# Patient Record
Sex: FEMALE | Race: BLACK OR AFRICAN AMERICAN | Employment: UNEMPLOYED | URBAN - METROPOLITAN AREA
[De-identification: names, ages, dates, MRNs, and addresses within clinical notes are randomized per-mention and may not be internally consistent; named-entity substitution may affect disease eponyms.]

---

## 2017-05-10 ENCOUNTER — APPOINTMENT (OUTPATIENT)
Dept: GENERAL RADIOLOGY | Age: 37
End: 2017-05-10
Attending: EMERGENCY MEDICINE
Payer: MEDICAID

## 2017-05-10 ENCOUNTER — HOSPITAL ENCOUNTER (EMERGENCY)
Age: 37
Discharge: HOME | End: 2017-05-10
Attending: EMERGENCY MEDICINE | Admitting: EMERGENCY MEDICINE
Payer: MEDICAID

## 2017-05-10 VITALS
WEIGHT: 125 LBS | DIASTOLIC BLOOD PRESSURE: 95 MMHG | RESPIRATION RATE: 16 BRPM | HEIGHT: 64 IN | HEART RATE: 77 BPM | TEMPERATURE: 98.3 F | OXYGEN SATURATION: 99 % | SYSTOLIC BLOOD PRESSURE: 127 MMHG | BODY MASS INDEX: 21.34 KG/M2

## 2017-05-10 DIAGNOSIS — R07.9 CHEST PAIN, UNSPECIFIED TYPE: ICD-10-CM

## 2017-05-10 DIAGNOSIS — I10 ESSENTIAL HYPERTENSION: Primary | ICD-10-CM

## 2017-05-10 LAB
ALBUMIN SERPL BCP-MCNC: 3.3 G/DL (ref 3.5–5)
ALBUMIN/GLOB SERPL: 0.6 {RATIO} (ref 1.1–2.2)
ALP SERPL-CCNC: 101 U/L (ref 45–117)
ALT SERPL-CCNC: 33 U/L (ref 12–78)
ANION GAP BLD CALC-SCNC: 9 MMOL/L (ref 5–15)
AST SERPL W P-5'-P-CCNC: 17 U/L (ref 15–37)
ATRIAL RATE: 80 BPM
BASOPHILS # BLD AUTO: 0.1 K/UL (ref 0–0.1)
BASOPHILS # BLD: 1 % (ref 0–1)
BILIRUB SERPL-MCNC: 0.5 MG/DL (ref 0.2–1)
BUN SERPL-MCNC: 15 MG/DL (ref 6–20)
BUN/CREAT SERPL: 20 (ref 12–20)
CALCIUM SERPL-MCNC: 9.2 MG/DL (ref 8.5–10.1)
CALCULATED P AXIS, ECG09: 60 DEGREES
CALCULATED R AXIS, ECG10: 17 DEGREES
CALCULATED T AXIS, ECG11: 15 DEGREES
CHLORIDE SERPL-SCNC: 105 MMOL/L (ref 97–108)
CK SERPL-CCNC: 38 U/L (ref 26–192)
CO2 SERPL-SCNC: 26 MMOL/L (ref 21–32)
CREAT SERPL-MCNC: 0.76 MG/DL (ref 0.55–1.02)
DIAGNOSIS, 93000: NORMAL
DIFFERENTIAL METHOD BLD: ABNORMAL
EOSINOPHIL # BLD: 0 K/UL (ref 0–0.4)
EOSINOPHIL NFR BLD: 0 % (ref 0–7)
ERYTHROCYTE [DISTWIDTH] IN BLOOD BY AUTOMATED COUNT: 16.7 % (ref 11.5–14.5)
GLOBULIN SER CALC-MCNC: 5.7 G/DL (ref 2–4)
GLUCOSE SERPL-MCNC: 81 MG/DL (ref 65–100)
HCT VFR BLD AUTO: 44.2 % (ref 35–47)
HGB BLD-MCNC: 14.9 G/DL (ref 11.5–16)
LYMPHOCYTES # BLD AUTO: 39 % (ref 12–49)
LYMPHOCYTES # BLD: 2.1 K/UL (ref 0.8–3.5)
MCH RBC QN AUTO: 26.3 PG (ref 26–34)
MCHC RBC AUTO-ENTMCNC: 33.7 G/DL (ref 30–36.5)
MCV RBC AUTO: 78 FL (ref 80–99)
MONOCYTES # BLD: 0.4 K/UL (ref 0–1)
MONOCYTES NFR BLD AUTO: 7 % (ref 5–13)
NEUTS SEG # BLD: 2.8 K/UL (ref 1.8–8)
NEUTS SEG NFR BLD AUTO: 53 % (ref 32–75)
P-R INTERVAL, ECG05: 130 MS
PLATELET # BLD AUTO: 237 K/UL (ref 150–400)
POTASSIUM SERPL-SCNC: 4.2 MMOL/L (ref 3.5–5.1)
PROT SERPL-MCNC: 9 G/DL (ref 6.4–8.2)
Q-T INTERVAL, ECG07: 372 MS
QRS DURATION, ECG06: 84 MS
QTC CALCULATION (BEZET), ECG08: 429 MS
RBC # BLD AUTO: 5.67 M/UL (ref 3.8–5.2)
RBC MORPH BLD: ABNORMAL
SODIUM SERPL-SCNC: 140 MMOL/L (ref 136–145)
TROPONIN I SERPL-MCNC: <0.04 NG/ML
VENTRICULAR RATE, ECG03: 80 BPM
WBC # BLD AUTO: 5.4 K/UL (ref 3.6–11)
WBC MORPH BLD: ABNORMAL

## 2017-05-10 PROCEDURE — 84484 ASSAY OF TROPONIN QUANT: CPT | Performed by: EMERGENCY MEDICINE

## 2017-05-10 PROCEDURE — 71010 XR CHEST PORT: CPT

## 2017-05-10 PROCEDURE — 99283 EMERGENCY DEPT VISIT LOW MDM: CPT

## 2017-05-10 PROCEDURE — 80053 COMPREHEN METABOLIC PANEL: CPT | Performed by: EMERGENCY MEDICINE

## 2017-05-10 PROCEDURE — 36415 COLL VENOUS BLD VENIPUNCTURE: CPT | Performed by: EMERGENCY MEDICINE

## 2017-05-10 PROCEDURE — 85025 COMPLETE CBC W/AUTO DIFF WBC: CPT | Performed by: EMERGENCY MEDICINE

## 2017-05-10 PROCEDURE — 93005 ELECTROCARDIOGRAM TRACING: CPT

## 2017-05-10 PROCEDURE — 82550 ASSAY OF CK (CPK): CPT | Performed by: EMERGENCY MEDICINE

## 2017-05-10 PROCEDURE — 74011250637 HC RX REV CODE- 250/637: Performed by: EMERGENCY MEDICINE

## 2017-05-10 RX ORDER — AMLODIPINE BESYLATE 5 MG/1
5 TABLET ORAL DAILY
Qty: 30 TAB | Refills: 1 | Status: SHIPPED | OUTPATIENT
Start: 2017-05-10 | End: 2017-12-20 | Stop reason: CLARIF

## 2017-05-10 RX ORDER — HYDROCHLOROTHIAZIDE 25 MG/1
25 TABLET ORAL DAILY
Qty: 30 TAB | Refills: 1 | Status: SHIPPED | OUTPATIENT
Start: 2017-05-10 | End: 2017-06-09

## 2017-05-10 RX ORDER — HYDROCHLOROTHIAZIDE 25 MG/1
25 TABLET ORAL
Status: COMPLETED | OUTPATIENT
Start: 2017-05-10 | End: 2017-05-10

## 2017-05-10 RX ORDER — AMLODIPINE BESYLATE 5 MG/1
5 TABLET ORAL
Status: COMPLETED | OUTPATIENT
Start: 2017-05-10 | End: 2017-05-10

## 2017-05-10 RX ADMIN — AMLODIPINE BESYLATE 5 MG: 5 TABLET ORAL at 12:35

## 2017-05-10 RX ADMIN — HYDROCHLOROTHIAZIDE 25 MG: 25 TABLET ORAL at 12:35

## 2017-05-10 NOTE — ED PROVIDER NOTES
HPI Comments: Cherie Chavarria is a 29 y.o. female with PMHx significant for HTN, Thalassemia, and a PSHx of a hysterectomy who presents ambulatory to [facility] ED with cc of intermittent non-radiating midsternal chest tightness for the \"last few weeks\". Pt also complains of her feet and ankles swelling recently, which gets worse in the night and not as bad in the morning. Pt notes that her BP has been high recently and that she has been off her BP medication because of switching PCPs. She states she was taking 25 mg Hydrochlorothiazide and 10 mg Amlodipine last taken 2 weeks ago. Pt also notes that she has been worked up for hepatitis in the past. Pt denies any hx of heart problems that she is aware of. She also denies any nausea or being diaphoretic. Calista Goodpasture, MD    Social Hx: + smoking; - EtOH; + illicit drug use (marijuana; methadone as opioid, administered through needle)    There are no other complains, changes, or physical findings at this time. The history is provided by the patient. No  was used. Past Medical History:   Diagnosis Date    Hypertension     Other ill-defined conditions(799.89) ovarian cyst    Thalassemia        Past Surgical History:   Procedure Laterality Date    HX GYN      HX HEENT      t&a    HX HYSTERECTOMY           No family history on file. Social History     Social History    Marital status: SINGLE     Spouse name: N/A    Number of children: N/A    Years of education: N/A     Occupational History    Not on file. Social History Main Topics    Smoking status: Current Every Day Smoker     Packs/day: 0.50    Smokeless tobacco: Not on file    Alcohol use No    Drug use: No    Sexual activity: Yes     Birth control/ protection: None     Other Topics Concern    Not on file     Social History Narrative         ALLERGIES: Nsaids (non-steroidal anti-inflammatory drug)    Review of Systems   Constitutional: Negative.   Negative for appetite change, chills, diaphoresis, fatigue and fever. HENT: Negative. Negative for congestion, rhinorrhea, sinus pressure and sore throat. Eyes: Negative. Respiratory: Positive for chest tightness (nonradiating). Negative for cough, choking, shortness of breath and wheezing. Cardiovascular: Negative. Negative for chest pain, palpitations and leg swelling. Gastrointestinal: Negative for abdominal pain, constipation, diarrhea, nausea and vomiting. Endocrine: Negative. Genitourinary: Negative. Negative for difficulty urinating, dysuria, flank pain and urgency. Musculoskeletal: Positive for joint swelling (ankle). Skin: Negative. Neurological: Negative. Negative for dizziness, speech difficulty, weakness, light-headedness, numbness and headaches. Psychiatric/Behavioral: Negative. All other systems reviewed and are negative. Patient Vitals for the past 12 hrs:   Temp Pulse Resp BP SpO2   05/10/17 1235 - 77 - (!) 127/95 -   05/10/17 1102 98.3 °F (36.8 °C) 99 16 (!) 141/94 99 %         Physical Exam   Constitutional: She is oriented to person, place, and time. She appears well-developed and well-nourished. No distress. HENT:   Head: Normocephalic and atraumatic. Mouth/Throat: Oropharynx is clear and moist. No oropharyngeal exudate. Eyes: Conjunctivae and EOM are normal. Pupils are equal, round, and reactive to light. Neck: Normal range of motion. Neck supple. No JVD present. No tracheal deviation present. Cardiovascular: Normal rate, regular rhythm, normal heart sounds and intact distal pulses. No murmur heard. Pulmonary/Chest: Effort normal and breath sounds normal. No stridor. No respiratory distress. She has no wheezes. She has no rales. She exhibits no tenderness. Abdominal: Soft. She exhibits no distension. There is no tenderness. There is no rebound and no guarding. Musculoskeletal: Normal range of motion. She exhibits no edema or tenderness.    Neurological: She is alert and oriented to person, place, and time. No cranial nerve deficit. No gross motor or sensory deficits    Skin: Skin is warm and dry. She is not diaphoretic. Psychiatric: She has a normal mood and affect. Her behavior is normal.   Nursing note and vitals reviewed.        MDM  Number of Diagnoses or Management Options  Chest pain, unspecified type:   Essential hypertension:   Diagnosis management comments: DDx: ACS, Hypertensional crisis, renal failure, heart failure       Amount and/or Complexity of Data Reviewed  Clinical lab tests: ordered and reviewed  Tests in the radiology section of CPT®: ordered and reviewed  Tests in the medicine section of CPT®: ordered and reviewed  Review and summarize past medical records: yes  Independent visualization of images, tracings, or specimens: yes    Patient Progress  Patient progress: stable      Procedures     EKG- NSR, rate 80, normal axis/pr/qrs, no acute ST-T wave changes, Hazel Masters, DO      LABORATORY TESTS:  Recent Results (from the past 12 hour(s))   EKG, 12 LEAD, INITIAL    Collection Time: 05/10/17 11:08 AM   Result Value Ref Range    Ventricular Rate 80 BPM    Atrial Rate 80 BPM    P-R Interval 130 ms    QRS Duration 84 ms    Q-T Interval 372 ms    QTC Calculation (Bezet) 429 ms    Calculated P Axis 60 degrees    Calculated R Axis 17 degrees    Calculated T Axis 15 degrees    Diagnosis       Normal sinus rhythm  Possible Left atrial enlargement  Nonspecific T wave abnormality  When compared with ECG of 11-MAY-2013 12:17,  No significant change  Confirmed by Katja Agudelo ([id number]) on 5/10/2017 12:28:40 PM     CBC WITH AUTOMATED DIFF    Collection Time: 05/10/17 12:56 PM   Result Value Ref Range    WBC 5.4 3.6 - 11.0 K/uL    RBC 5.67 (H) 3.80 - 5.20 M/uL    HGB 14.9 11.5 - 16.0 g/dL    HCT 44.2 35.0 - 47.0 %    MCV 78.0 (L) 80.0 - 99.0 FL    MCH 26.3 26.0 - 34.0 PG    MCHC 33.7 30.0 - 36.5 g/dL    RDW 16.7 (H) 11.5 - 14.5 %    PLATELET 144 479 - 400 K/uL    NEUTROPHILS 53 32 - 75 %    LYMPHOCYTES 39 12 - 49 %    MONOCYTES 7 5 - 13 %    EOSINOPHILS 0 0 - 7 %    BASOPHILS 1 0 - 1 %    ABS. NEUTROPHILS 2.8 1.8 - 8.0 K/UL    ABS. LYMPHOCYTES 2.1 0.8 - 3.5 K/UL    ABS. MONOCYTES 0.4 0.0 - 1.0 K/UL    ABS. EOSINOPHILS 0.0 0.0 - 0.4 K/UL    ABS. BASOPHILS 0.1 0.0 - 0.1 K/UL    RBC COMMENTS ANISOCYTOSIS  1+        WBC COMMENTS REACTIVE LYMPHS      DF SMEAR SCANNED     METABOLIC PANEL, COMPREHENSIVE    Collection Time: 05/10/17 12:56 PM   Result Value Ref Range    Sodium 140 136 - 145 mmol/L    Potassium 4.2 3.5 - 5.1 mmol/L    Chloride 105 97 - 108 mmol/L    CO2 26 21 - 32 mmol/L    Anion gap 9 5 - 15 mmol/L    Glucose 81 65 - 100 mg/dL    BUN 15 6 - 20 MG/DL    Creatinine 0.76 0.55 - 1.02 MG/DL    BUN/Creatinine ratio 20 12 - 20      GFR est AA >60 >60 ml/min/1.73m2    GFR est non-AA >60 >60 ml/min/1.73m2    Calcium 9.2 8.5 - 10.1 MG/DL    Bilirubin, total 0.5 0.2 - 1.0 MG/DL    ALT (SGPT) 33 12 - 78 U/L    AST (SGOT) 17 15 - 37 U/L    Alk. phosphatase 101 45 - 117 U/L    Protein, total 9.0 (H) 6.4 - 8.2 g/dL    Albumin 3.3 (L) 3.5 - 5.0 g/dL    Globulin 5.7 (H) 2.0 - 4.0 g/dL    A-G Ratio 0.6 (L) 1.1 - 2.2     CK W/ REFLX CKMB    Collection Time: 05/10/17 12:56 PM   Result Value Ref Range    CK 38 26 - 192 U/L   TROPONIN I    Collection Time: 05/10/17 12:56 PM   Result Value Ref Range    Troponin-I, Qt. <0.04 <0.05 ng/mL       IMAGING RESULTS:    CXR Results  (Last 48 hours)               05/10/17 1248  XR CHEST PORT Final result    Impression:  IMPRESSION:       No acute thoracic disease. Narrative:  Clinical history: Chest pain   INDICATION: Chest pain, back pain recent trauma               COMPARISON: 5/11/2013       FINDINGS:    AP portable erect view of the chest is obtained . The cardiopericardial   silhouette is stable. There is no pleural effusion, pneumothorax or focal   consolidation present.                   MEDICATIONS GIVEN:  Medications hydroCHLOROthiazide (HYDRODIURIL) tablet 25 mg (25 mg Oral Given 5/10/17 1235)   amLODIPine (NORVASC) tablet 5 mg (5 mg Oral Given 5/10/17 1235)       IMPRESSION:  1. Essential hypertension    2. Chest pain, unspecified type        PLAN:  1. Current Discharge Medication List      CONTINUE these medications which have CHANGED    Details   amLODIPine (NORVASC) 5 mg tablet Take 1 Tab by mouth daily. Qty: 30 Tab, Refills: 1      hydroCHLOROthiazide (HYDRODIURIL) 25 mg tablet Take 1 Tab by mouth daily for 30 days. Qty: 30 Tab, Refills: 1           2. Follow-up Information     Follow up With Details [address], [place]   [address] [facility]  [phone]      [facility] EMERGENCY DEPT  If symptoms worsen [address]  [address]  [phone]        Return to ED if worse     DISCHARGE NOTE  2:22 PM  The patient has been re-evaluated and is ready for discharge. Reviewed available results with patient. Counseled patient on diagnosis and care plan. Patient has expressed understanding, and all questions have been answered. Patient agrees with plan and agrees to follow up as recommended, or return to the ED if their symptoms worsen. Discharge instructions have been provided and explained to the patient, along with reasons to return to the ED. ATTESTATION:  This note is prepared by Jessica Sutherland, acting as Scribe for Jonelle Goodpasture, MD.    Jonelle Goodpasture, MD: The scribe's documentation has been prepared under my direction and personally reviewed by me in its entirety. I confirm that the note above accurately reflects all work, treatment, procedures, and medical decision making performed by me.

## 2017-05-10 NOTE — DISCHARGE INSTRUCTIONS
Chest Pain: Care Instructions  Your Care Instructions  There are many things that can cause chest pain. Some are not serious and will get better on their own in a few days. But some kinds of chest pain need more testing and treatment. Your doctor may have recommended a follow-up visit in the next 8 to 12 hours. If you are not getting better, you may need more tests or treatment. Even though your doctor has released you, you still need to watch for any problems. The doctor carefully checked you, but sometimes problems can develop later. If you have new symptoms or if your symptoms do not get better, get medical care right away. If you have worse or different chest pain or pressure that lasts more than 5 minutes or you passed out (lost consciousness), call 911 or seek other emergency help right away. A medical visit is only one step in your treatment. Even if you feel better, you still need to do what your doctor recommends, such as going to all suggested follow-up appointments and taking medicines exactly as directed. This will help you recover and help prevent future problems. How can you care for yourself at home? · Rest until you feel better. · Take your medicine exactly as prescribed. Call your doctor if you think you are having a problem with your medicine. · Do not drive after taking a prescription pain medicine. When should you call for help? Call 911 if:  · You passed out (lost consciousness). · You have severe difficulty breathing. · You have symptoms of a heart attack. These may include:  ¨ Chest pain or pressure, or a strange feeling in your chest.  ¨ Sweating. ¨ Shortness of breath. ¨ Nausea or vomiting. ¨ Pain, pressure, or a strange feeling in your back, neck, jaw, or upper belly or in one or both shoulders or arms. ¨ Lightheadedness or sudden weakness. ¨ A fast or irregular heartbeat.   After you call 911, the  may tell you to chew 1 adult-strength or 2 to 4 low-dose aspirin. Wait for an ambulance. Do not try to drive yourself. Call your doctor today if:  · You have any trouble breathing. · Your chest pain gets worse. · You are dizzy or lightheaded, or you feel like you may faint. · You are not getting better as expected. · You are having new or different chest pain. Where can you learn more? Go to http://darlin-breann.info/. Enter A120 in the search box to learn more about \"Chest Pain: Care Instructions. \"  Current as of: May 27, 2016  Content Version: 11.2  © 6613-5857 [employer]. Care instructions adapted under license by [employer] (which disclaims liability or warranty for this information). If you have questions about a medical condition or this instruction, always ask your healthcare professional. [address] any warranty or liability for your use of this information. High Blood Pressure: Care Instructions  Your Care Instructions  If your blood pressure is usually above 140/90, you have high blood pressure, or hypertension. That means the top number is 140 or higher or the bottom number is 90 or higher, or both. Despite what a lot of people think, high blood pressure usually doesn't cause headaches or make you feel dizzy or lightheaded. It usually has no symptoms. But it does increase your risk for heart attack, stroke, and kidney or eye damage. The higher your blood pressure, the more your risk increases. Your doctor will give you a goal for your blood pressure. Your goal will be based on your health and your age. An example of a goal is to keep your blood pressure below 140/90. Lifestyle changes, such as eating healthy and being active, are always important to help lower blood pressure. You might also take medicine to reach your blood pressure goal.  Follow-up care is a key part of your treatment and safety.  Be sure to make and go to all appointments, and call your doctor if you are having problems. It's also a good idea to know your test results and keep a list of the medicines you take. How can you care for yourself at home? Medical treatment  · If you stop taking your medicine, your blood pressure will go back up. You may take one or more types of medicine to lower your blood pressure. Be safe with medicines. Take your medicine exactly as prescribed. Call your doctor if you think you are having a problem with your medicine. · Talk to your doctor before you start taking aspirin every day. Aspirin can help certain people lower their risk of a heart attack or stroke. But taking aspirin isn't right for everyone, because it can cause serious bleeding. · See your doctor regularly. You may need to see the doctor more often at first or until your blood pressure comes down. · If you are taking blood pressure medicine, talk to your doctor before you take decongestants or anti-inflammatory medicine, such as ibuprofen. Some of these medicines can raise blood pressure. · Learn how to check your blood pressure at home. Lifestyle changes  · Stay at a healthy weight. This is especially important if you put on weight around the waist. Losing even 10 pounds can help you lower your blood pressure. · If your doctor recommends it, get more exercise. Walking is a good choice. Bit by bit, increase the amount you walk every day. Try for at least 30 minutes on most days of the week. You also may want to swim, bike, or do other activities. · Avoid or limit alcohol. Talk to your doctor about whether you can drink any alcohol. · Try to limit how much sodium you eat to less than 2,300 milligrams (mg) a day. Your doctor may ask you to try to eat less than 1,500 mg a day. · Eat plenty of fruits (such as bananas and oranges), vegetables, legumes, whole grains, and low-fat dairy products. · Lower the amount of saturated fat in your diet. Saturated fat is found in animal products such as milk, cheese, and meat. Limiting these foods may help you lose weight and also lower your risk for heart disease. · Do not smoke. Smoking increases your risk for heart attack and stroke. If you need help quitting, talk to your doctor about stop-smoking programs and medicines. These can increase your chances of quitting for good. When should you call for help? Call 911 anytime you think you may need emergency care. This may mean having symptoms that suggest that your blood pressure is causing a serious heart or blood vessel problem. Your blood pressure may be over 180/110. For example, call 911 if:  · You have symptoms of a heart attack. These may include:  ¨ Chest pain or pressure, or a strange feeling in the chest.  ¨ Sweating. ¨ Shortness of breath. ¨ Nausea or vomiting. ¨ Pain, pressure, or a strange feeling in the back, neck, jaw, or upper belly or in one or both shoulders or arms. ¨ Lightheadedness or sudden weakness. ¨ A fast or irregular heartbeat. · You have symptoms of a stroke. These may include:  ¨ Sudden numbness, tingling, weakness, or loss of movement in your face, arm, or leg, especially on only one side of your body. ¨ Sudden vision changes. ¨ Sudden trouble speaking. ¨ Sudden confusion or trouble understanding simple statements. ¨ Sudden problems with walking or balance. ¨ A sudden, severe headache that is different from past headaches. · You have severe back or belly pain. Do not wait until your blood pressure comes down on its own. Get help right away. Call your doctor now or seek immediate care if:  · Your blood pressure is much higher than normal (such as 180/110 or higher), but you don't have symptoms. · You think high blood pressure is causing symptoms, such as:  ¨ Severe headache. ¨ Blurry vision. Watch closely for changes in your health, and be sure to contact your doctor if:  · Your blood pressure measures 140/90 or higher at least 2 times.  That means the top number is 140 or higher or the bottom number is 90 or higher, or both. · You think you may be having side effects from your blood pressure medicine. · Your blood pressure is usually normal, but it goes above normal at least 2 times. Where can you learn more? Go to http://darlin-breann.info/. Enter C399 in the search box to learn more about \"High Blood Pressure: Care Instructions. \"  Current as of: August 8, 2016  Content Version: 11.2  © 5395-0417 [employer]. Care instructions adapted under license by [employer] (which disclaims liability or warranty for this information). If you have questions about a medical condition or this instruction, always ask your healthcare professional. [address] any warranty or liability for your use of this information.       THERE IS A HYPERTENSION CLINIC DOWNTOWN IF YOU ARE CONTINUING TO HAVE DIFFICULTIES FINDING A PCP

## 2017-11-03 ENCOUNTER — OFFICE VISIT (OUTPATIENT)
Dept: INTERNAL MEDICINE CLINIC | Age: 37
End: 2017-11-03

## 2017-11-03 VITALS
WEIGHT: 118.8 LBS | HEART RATE: 77 BPM | HEIGHT: 64 IN | OXYGEN SATURATION: 97 % | DIASTOLIC BLOOD PRESSURE: 92 MMHG | BODY MASS INDEX: 20.28 KG/M2 | SYSTOLIC BLOOD PRESSURE: 123 MMHG | TEMPERATURE: 98.3 F | RESPIRATION RATE: 16 BRPM

## 2017-11-03 DIAGNOSIS — R74.8 ELEVATED LIVER ENZYMES: ICD-10-CM

## 2017-11-03 DIAGNOSIS — F41.9 ANXIETY DISORDER, UNSPECIFIED TYPE: ICD-10-CM

## 2017-11-03 DIAGNOSIS — Z90.721 S/P HYSTERECTOMY WITH OOPHORECTOMY: ICD-10-CM

## 2017-11-03 DIAGNOSIS — F43.10 PTSD (POST-TRAUMATIC STRESS DISORDER): ICD-10-CM

## 2017-11-03 DIAGNOSIS — Z00.00 WELL ADULT EXAM: Primary | ICD-10-CM

## 2017-11-03 DIAGNOSIS — F11.20 METHADONE MAINTENANCE THERAPY PATIENT (HCC): ICD-10-CM

## 2017-11-03 DIAGNOSIS — D56.0 ALPHA THALASSEMIA (HCC): ICD-10-CM

## 2017-11-03 DIAGNOSIS — Z90.710 S/P HYSTERECTOMY WITH OOPHORECTOMY: ICD-10-CM

## 2017-11-03 DIAGNOSIS — M65.331 TRIGGER MIDDLE FINGER OF RIGHT HAND: ICD-10-CM

## 2017-11-03 DIAGNOSIS — Z23 ENCOUNTER FOR IMMUNIZATION: ICD-10-CM

## 2017-11-03 DIAGNOSIS — F33.9 RECURRENT MAJOR DEPRESSIVE DISORDER, REMISSION STATUS UNSPECIFIED (HCC): ICD-10-CM

## 2017-11-03 DIAGNOSIS — I10 ESSENTIAL HYPERTENSION: ICD-10-CM

## 2017-11-03 RX ORDER — HYDROCHLOROTHIAZIDE 25 MG/1
25 TABLET ORAL DAILY
COMMUNITY
End: 2017-12-20 | Stop reason: CLARIF

## 2017-11-03 RX ORDER — SERTRALINE HYDROCHLORIDE 50 MG/1
50 TABLET, FILM COATED ORAL DAILY
Qty: 1 TAB | Refills: 0 | Status: SHIPPED | OUTPATIENT
Start: 2017-11-03 | End: 2018-02-22 | Stop reason: SDDI

## 2017-11-03 RX ORDER — GABAPENTIN 100 MG/1
100 CAPSULE ORAL 3 TIMES DAILY
Qty: 1 CAP | Refills: 0 | Status: SHIPPED | OUTPATIENT
Start: 2017-11-03 | End: 2018-02-22 | Stop reason: SDDI

## 2017-11-03 RX ORDER — FLUOXETINE 20 MG/1
20 TABLET ORAL DAILY
Qty: 1 TAB | Refills: 0 | Status: SHIPPED | OUTPATIENT
Start: 2017-11-03 | End: 2018-02-22 | Stop reason: SDDI

## 2017-11-03 RX ORDER — METHADONE HYDROCHLORIDE 10 MG/1
80 TABLET ORAL DAILY
Qty: 1 TAB | Refills: 0
Start: 2017-11-03 | End: 2021-06-08 | Stop reason: ALTCHOICE

## 2017-11-03 NOTE — PROGRESS NOTES
Hua Lyles is a 40 y.o. female and presents with New Patient  . Subjective:    Pt comes-in to establish care and to be tested for hep c. 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 Pt had labs done at the methadone clinic which demonstarted elevated lfts. Pt denies drinking ANY alcohol. She was instructed to find a PCP for w/u. Pts sex partner is +hep c. Pt reports she was tested for hiv/syhylis/gc/chlamydia and all are neg. Pt also has pmh bipoar d/o, PTSD/anxiety d/o and is followed by psychiatry. She has a h/o psychiatric admissions for suicidal ideation. Last admission 8/16. Pt denies SI currently. Pt w c/o:  1)Amrit hand swelling and stiffness x years. No joint pain. Her right hand is worse than left    2)Rt index finger \"locks\" on her. She has to manually unbend it. Hypertension Review:  The patient has essential hypertension  Diet and Lifestyle: generally follows a  low sodium diet, exercises-walks regularly  Home BP Monitoring: is not measured at home. Pertinent ROS: taking medications as instructed, no medication side effects noted, no TIA's, no chest pain on exertion, no dyspnea on exertion, no swelling of ankles. Tet vaccine-??? Lives w 3 children  Works as housekeeping  + sex active  Pap ~ 1 1/2 year nml  exercie-walks regularly    S/p hysterectomy and oophorectomy due to endometriosis, fibroids and ovarian cysts      Review of Systems  Constitutional: negative for fevers, chills, anorexia and weight loss  Eyes:   negative for visual disturbance and irritation  ENT:   negative for tinnitus,sore throat,nasal congestion,ear pains. hoarseness  Respiratory:  negative for cough, hemoptysis, dyspnea,wheezing  CV:   negative for chest pain, palpitations, +intermitt le extremity edema  GI:   negative for nausea, vomiting, diarrhea, abdominal pain,melena  Endo:               negative for polyuria,polydipsia,polyphagia,heat intolerance  Genitourinary: negative for frequency, dysuria and hematuria  Integument:  negative for rash and pruritus  Hematologic:  negative for easy bruising and gum/nose bleeding  Musculoskel: negative for myalgias, arthralgias, back pain, muscle weakness, joint pain  Neurological:  negative for headaches, dizziness, vertigo, memory problems and gait   Behavl/Psych: positive for feelings of anxiety, depression, mood changes    Past Medical History:   Diagnosis Date    ex- IVDU (intravenous drug user)     H/O hysterectomy for benign disease     Hypertension     Major depression, recurrent ([facility]     Methadone maintenance therapy patient ([facility]     Other ill-defined conditions(799.89) ovarian cyst    PTSD (post-traumatic stress disorder)     Thalassemia      Past Surgical History:   Procedure Laterality Date    HX GYN      HX HEENT      t&a    HX HYSTERECTOMY       Social History     Social History    Marital status: SINGLE     Spouse name: N/A    Number of children: N/A    Years of education: N/A     Social History Main Topics    Smoking status: Current Every Day Smoker     Packs/day: 0.50    Smokeless tobacco: Current User    Alcohol use No    Drug use: No    Sexual activity: Not Currently     Birth control/ protection: None     Other Topics Concern    None     Social History Narrative     Family History   Problem Relation Age of Onset    Hypertension Mother     Hypertension Sister     Asthma Sister     Heart Disease Sister     Diabetes Maternal Aunt     Diabetes Maternal Uncle      Current Outpatient Prescriptions   Medication Sig Dispense Refill    hydroCHLOROthiazide (HYDRODIURIL) 25 mg tablet Take 25 mg by mouth daily.  methadone (DOLOPHINE) 10 mg tablet Take 8 Tabs by mouth daily. Max Daily Amount: 80 mg. 1 Tab 0    sertraline (ZOLOFT) 50 mg tablet Take 1 Tab by mouth daily. Indications: ANXIETY WITH DEPRESSION 1 Tab 0    gabapentin (NEURONTIN) 100 mg capsule Take 1 Cap by mouth three (3) times daily.  1 Cap 0    FLUoxetine (PROZAC) 20 mg tablet Take 1 Tab by mouth daily. 1 Tab 0    amLODIPine (NORVASC) 5 mg tablet Take 1 Tab by mouth daily. 30 Tab 1    estradiol (ESTRACE) 2 mg tablet Take 2 mg by mouth daily. Allergies   Allergen Reactions    Nsaids (Non-Steroidal Anti-Inflammatory Drug) Unknown (comments)     Dr. Nida Onofre told me no NSAID's because of hypertension problems. Objective:  Visit Vitals    BP (!) 123/92 (BP 1 Location: Left arm, BP Patient Position: Sitting)    Pulse 77    Temp 98.3 °F (36.8 °C) (Oral)    Resp 16    Ht 5' 4\" (1.626 m)    Wt 118 lb 12.8 oz (53.9 kg)    SpO2 97%    BMI 20.39 kg/m2     Physical Exam:   General appearance - alert, well appearing, and in no distress  Mental status - alert, oriented to person, place, and time  EYE-SOHA, EOMI, corneas normal, no foreign bodies  ENT-ENT exam normal, no neck nodes or sinus tenderness  Mouth - mucous membranes moist, pharynx normal without lesions  Neck - supple, no significant adenopathy   Chest - clear to auscultation, no wheezes, rales or rhonchi, symmetric air entry   Heart - normal rate, regular rhythm, normal S1, S2, no murmurs, rubs, clicks or gallops   Abdomen - soft, nontender, nondistended, no masses or organomegaly  Ext-peripheral pulses normal, no pedal edema, no clubbing or cyanosis  Skin-Warm and dry.  no hyperpigmentation, vitiligo, +track marks edna inner arms rt>lt  Neuro -alert, oriented, normal speech, no focal findings or movement disorder noted  Hands-large, doughy edema edna rt>lt      Results for orders placed or performed during the hospital encounter of 05/10/17   CBC WITH AUTOMATED DIFF   Result Value Ref Range    WBC 5.4 3.6 - 11.0 K/uL    RBC 5.67 (H) 3.80 - 5.20 M/uL    HGB 14.9 11.5 - 16.0 g/dL    HCT 44.2 35.0 - 47.0 %    MCV 78.0 (L) 80.0 - 99.0 FL    MCH 26.3 26.0 - 34.0 PG    MCHC 33.7 30.0 - 36.5 g/dL    RDW 16.7 (H) 11.5 - 14.5 %    PLATELET 068 156 - 876 K/uL    NEUTROPHILS 53 32 - 75 % LYMPHOCYTES 39 12 - 49 %    MONOCYTES 7 5 - 13 %    EOSINOPHILS 0 0 - 7 %    BASOPHILS 1 0 - 1 %    ABS. NEUTROPHILS 2.8 1.8 - 8.0 K/UL    ABS. LYMPHOCYTES 2.1 0.8 - 3.5 K/UL    ABS. MONOCYTES 0.4 0.0 - 1.0 K/UL    ABS. EOSINOPHILS 0.0 0.0 - 0.4 K/UL    ABS. BASOPHILS 0.1 0.0 - 0.1 K/UL    RBC COMMENTS ANISOCYTOSIS  1+        WBC COMMENTS REACTIVE LYMPHS      DF SMEAR SCANNED     METABOLIC PANEL, COMPREHENSIVE   Result Value Ref Range    Sodium 140 136 - 145 mmol/L    Potassium 4.2 3.5 - 5.1 mmol/L    Chloride 105 97 - 108 mmol/L    CO2 26 21 - 32 mmol/L    Anion gap 9 5 - 15 mmol/L    Glucose 81 65 - 100 mg/dL    BUN 15 6 - 20 MG/DL    Creatinine 0.76 0.55 - 1.02 MG/DL    BUN/Creatinine ratio 20 12 - 20      GFR est AA >60 >60 ml/min/1.73m2    GFR est non-AA >60 >60 ml/min/1.73m2    Calcium 9.2 8.5 - 10.1 MG/DL    Bilirubin, total 0.5 0.2 - 1.0 MG/DL    ALT (SGPT) 33 12 - 78 U/L    AST (SGOT) 17 15 - 37 U/L    Alk. phosphatase 101 45 - 117 U/L    Protein, total 9.0 (H) 6.4 - 8.2 g/dL    Albumin 3.3 (L) 3.5 - 5.0 g/dL    Globulin 5.7 (H) 2.0 - 4.0 g/dL    A-G Ratio 0.6 (L) 1.1 - 2.2     CK W/ REFLX CKMB   Result Value Ref Range    CK 38 26 - 192 U/L   TROPONIN I   Result Value Ref Range    Troponin-I, Qt. <0.04 <0.05 ng/mL   EKG, 12 LEAD, INITIAL   Result Value Ref Range    Ventricular Rate 80 BPM    Atrial Rate 80 BPM    P-R Interval 130 ms    QRS Duration 84 ms    Q-T Interval 372 ms    QTC Calculation (Bezet) 429 ms    Calculated P Axis 60 degrees    Calculated R Axis 17 degrees    Calculated T Axis 15 degrees    Diagnosis       Normal sinus rhythm  Possible Left atrial enlargement  Nonspecific T wave abnormality  When compared with ECG of 11-MAY-2013 12:17,  No significant change  Confirmed by Diana Sifuentes ([id number]) on 5/10/2017 12:28:40 PM         Assessment/Plan:    ICD-10-CM ICD-9-CM    1. Well adult exam Z00.00 V70.0    2.  Elevated liver enzymes R74.8 790.5 LIPID PANEL      HEPATIC FUNCTION PANEL HEPATITIS C AB   3. Trigger middle finger of right hand M65.331 727.03 REFERRAL TO ORTHOPEDICS   4. Methadone maintenance therapy patient ([facility] F11.20 304.00 methadone (DOLOPHINE) 10 mg tablet   5. Essential hypertension I10 401.9 hydroCHLOROthiazide (HYDRODIURIL) 25 mg tablet   6. Anxiety disorder, unspecified type F41.9 300.00 sertraline (ZOLOFT) 50 mg tablet      FLUoxetine (PROZAC) 20 mg tablet   7. Recurrent major depressive disorder, remission status unspecified (HCC) F33.9 296.30 sertraline (ZOLOFT) 50 mg tablet      FLUoxetine (PROZAC) 20 mg tablet   8. PTSD (post-traumatic stress disorder) F43.10 309.81    9. Alpha thalassemia (HCC) D56.0 282.43    10. S/P hysterectomy with oophorectomy Z90.710 V88.01     Z90.721     11. Encounter for immunization Z23 V03.89 TETANUS, DIPHTHERIA TOXOIDS AND ACELLULAR PERTUSSIS VACCINE (TDAP), IN INDIVIDS. >=7, IM     Orders Placed This Encounter    TETANUS, DIPHTHERIA TOXOIDS AND ACELLULAR PERTUSSIS VACCINE (TDAP), IN INDIVIDS. >=7, IM    LIPID PANEL    HEPATIC FUNCTION PANEL    HEPATITIS C AB   [facility]     Referral Priority:   Routine     Referral Type:   Consultation     Referral Reason:   Specialty Services Required     Referral Location:   [place] [address] [facility]     Referred to Provider:   Kelvin Guzmán MD    hydroCHLOROthiazide (HYDRODIURIL) 25 mg tablet     Sig: Take 25 mg by mouth daily.  methadone (DOLOPHINE) 10 mg tablet     Sig: Take 8 Tabs by mouth daily. Max Daily Amount: 80 mg. Dispense:  1 Tab     Refill:  0    sertraline (ZOLOFT) 50 mg tablet     Sig: Take 1 Tab by mouth daily. Indications: ANXIETY WITH DEPRESSION     Dispense:  1 Tab     Refill:  0    gabapentin (NEURONTIN) 100 mg capsule     Sig: Take 1 Cap by mouth three (3) times daily. Dispense:  1 Cap     Refill:  0    FLUoxetine (PROZAC) 20 mg tablet     Sig: Take 1 Tab by mouth daily.      Dispense:  1 Tab     Refill:  0     follow low salt diet, routine labs ordered    Patient Instructions        Low Sodium Diet (2,000 Milligram): Care Instructions  Your Care Instructions    Too much sodium causes your body to hold on to extra water. This can raise your blood pressure and force your heart and kidneys to work harder. In very serious cases, this could cause you to be put in the hospital. It might even be life-threatening. By limiting sodium, you will feel better and lower your risk of serious problems. The most common source of sodium is salt. People get most of the salt in their diet from canned, prepared, and packaged foods. Fast food and restaurant meals also are very high in sodium. Your doctor will probably limit your sodium to less than 2,000 milligrams (mg) a day. This limit counts all the sodium in prepared and packaged foods and any salt you add to your food. Follow-up care is a key part of your treatment and safety. Be sure to make and go to all appointments, and call your doctor if you are having problems. It's also a good idea to know your test results and keep a list of the medicines you take. How can you care for yourself at home? Read food labels  · Read labels on cans and food packages. The labels tell you how much sodium is in each serving. Make sure that you look at the serving size. If you eat more than the serving size, you have eaten more sodium. · Food labels also tell you the Percent Daily Value for sodium. Choose products with low Percent Daily Values for sodium. · Be aware that sodium can come in forms other than salt, including monosodium glutamate (MSG), sodium citrate, and sodium bicarbonate (baking soda). MSG is often added to Asian food. When you eat out, you can sometimes ask for food without MSG or added salt. Buy low-sodium foods  · Buy foods that are labeled \"unsalted\" (no salt added), \"sodium-free\" (less than 5 mg of sodium per serving), or \"low-sodium\" (less than 140 mg of sodium per serving).  Foods labeled \"reduced-sodium\" and \"light sodium\" may still have too much sodium. Be sure to read the label to see how much sodium you are getting. · Buy fresh vegetables, or frozen vegetables without added sauces. Buy low-sodium versions of canned vegetables, soups, and other canned goods. Prepare low-sodium meals  · Cut back on the amount of salt you use in cooking. This will help you adjust to the taste. Do not add salt after cooking. One teaspoon of salt has about 2,300 mg of sodium. · Take the salt shaker off the table. · Flavor your food with garlic, lemon juice, onion, vinegar, herbs, and spices. Do not use soy sauce, lite soy sauce, steak sauce, onion salt, garlic salt, celery salt, mustard, or ketchup on your food. · Use low-sodium salad dressings, sauces, and ketchup. Or make your own salad dressings and sauces without adding salt. · Use less salt (or none) when recipes call for it. You can often use half the salt a recipe calls for without losing flavor. Other foods such as rice, pasta, and grains do not need added salt. · Rinse canned vegetables, and cook them in fresh water. This removes some-but not all-of the salt. · Avoid water that is naturally high in sodium or that has been treated with water softeners, which add sodium. Call your local water company to find out the sodium content of your water supply. If you buy bottled water, read the label and choose a sodium-free brand. Avoid high-sodium foods  · Avoid eating:  ¨ Smoked, cured, salted, and canned meat, fish, and poultry. ¨ Ham, uriarte, hot dogs, and luncheon meats. ¨ Regular, hard, and processed cheese and regular peanut butter. ¨ Crackers with salted tops, and other salted snack foods such as pretzels, chips, and salted popcorn. ¨ Frozen prepared meals, unless labeled low-sodium. ¨ Canned and dried soups, broths, and bouillon, unless labeled sodium-free or low-sodium. ¨ Canned vegetables, unless labeled sodium-free or low-sodium.   ¨ [place] fries, pizza, tacos, and other fast foods. ¨ Pickles, olives, ketchup, and other condiments, especially soy sauce, unless labeled sodium-free or low-sodium. Where can you learn more? Go to http://darlin-breann.info/. Enter W226 in the search box to learn more about \"Low Sodium Diet (2,000 Milligram): Care Instructions. \"  Current as of: May 12, 2017  Content Version: 11.4  © 4820-9835 [employer]. Care instructions adapted under license by [employer] (which disclaims liability or warranty for this information). If you have questions about a medical condition or this instruction, always ask your healthcare professional. [place] [address] any warranty or liability for your use of this information. Follow-up Disposition:  Return in about 6 weeks (around 12/15/2017) for bp check. I have reviewed with the patient details of the assessment and plan and all questions were answered. Relevent patient education was performed. The most recent lab findings were reviewed with the patient. An After Visit Summary was printed and given to the patient.       Total encounter time was 45 minutes;>50% of time was spent counseling/coordinating care regarding hep c/htn/low salt diet/med compliance

## 2017-11-03 NOTE — PROGRESS NOTES
1. Have you been to the ER, urgent care clinic since your last visit? Hospitalized since your last visit? No.    2. Have you seen or consulted any other health care providers outside of the [address] [place] [address] since your last visit? Include any pap smears or colon screening.  No.

## 2017-11-03 NOTE — PATIENT INSTRUCTIONS

## 2017-11-03 NOTE — MR AVS SNAPSHOT
Visit Information Date & Time Provider Department Dept. Phone Encounter #  
 11/3/2017  9:00 AM Johnna Barthel, [address]  [id number] Follow-up Instructions Return in about 6 weeks (around 12/15/2017) for bp check. Upcoming Health Maintenance Date Due DTaP/Tdap/Td series (1 - Tdap) 9/5/2002 PAP AKA CERVICAL CYTOLOGY 9/5/2002 Allergies as of 11/3/2017  Review Complete On: 11/3/2017 By: Johnna Barthel, MD  
  
 Severity Noted Reaction Type Reactions Nsaids (Non-steroidal Anti-inflammatory Drug)  09/25/2011    Unknown (comments) Dr. Yana Bui told me no NSAID's because of hypertension problems. Current Immunizations  Never Reviewed Name Date Tdap  Incomplete Not reviewed this visit You Were Diagnosed With   
  
 Codes Comments Well adult exam    -  Primary ICD-10-CM: Z00.00 ICD-9-CM: V70.0 Encounter for immunization     ICD-10-CM: H14 ICD-9-CM: V03.89 Trigger middle finger of right hand     ICD-10-CM: M65.331 ICD-9-CM: 727.03 Anxiety disorder, unspecified type     ICD-10-CM: F41.9 ICD-9-CM: 300.00 Recurrent major depressive disorder, remission status unspecified ([facility]     ICD-10-CM: F33.9 ICD-9-CM: 296.30 PTSD (post-traumatic stress disorder)     ICD-10-CM: F43.10 ICD-9-CM: 309.81 Elevated liver enzymes     ICD-10-CM: R74.8 ICD-9-CM: 790.5 Alpha thalassemia (HCC)     ICD-10-CM: D56.0 ICD-9-CM: 282.43 S/P hysterectomy with oophorectomy     ICD-10-CM: Z90.710, Z90.721 ICD-9-CM: V88.01 Vitals BP Pulse Temp Resp Height(growth percentile) Weight(growth percentile) (!) 123/92 (BP 1 Location: Left arm, BP Patient Position: Sitting) 77 98.3 °F (36.8 °C) (Oral) 16 5' 4\" (1.626 m) 118 lb 12.8 oz (53.9 kg) SpO2 BMI OB Status Smoking Status 97% 20.39 kg/m2 Hysterectomy Current Every Day Smoker Vitals History BMI and BSA Data Body Mass Index Body Surface Area  
 20.39 kg/m 2 1.56 m 2 Your Updated Medication List  
  
   
This list is accurate as of: 11/3/17 10:02 AM.  Always use your most recent med list. amLODIPine 5 mg tablet Commonly known as:  [place] Take 1 Tab by mouth daily. estradiol 2 mg tablet Commonly known as:  ESTRACE Take 2 mg by mouth daily. FLUoxetine 20 mg tablet Commonly known as:  PROzac Take 1 Tab by mouth daily. gabapentin 100 mg capsule Commonly known as:  NEURONTIN Take 1 Cap by mouth three (3) times daily. hydroCHLOROthiazide 25 mg tablet Commonly known as:  HYDRODIURIL Take 25 mg by mouth daily. methadone 10 mg tablet Commonly known as:  DOLOPHINE Take 8 Tabs by mouth daily. Max Daily Amount: 80 mg.  
  
 sertraline 50 mg tablet Commonly known as:  ZOLOFT Take 1 Tab by mouth daily. Indications: ANXIETY WITH DEPRESSION Prescriptions Printed Refills  
 sertraline (ZOLOFT) 50 mg tablet 0 Sig: Take 1 Tab by mouth daily. Indications: ANXIETY WITH DEPRESSION Class: Print Route: Oral  
 gabapentin (NEURONTIN) 100 mg capsule 0 Sig: Take 1 Cap by mouth three (3) times daily. Class: Print Route: Oral  
 FLUoxetine (PROZAC) 20 mg tablet 0 Sig: Take 1 Tab by mouth daily. Class: Print Route: Oral  
  
We Performed the Following HEPATIC FUNCTION PANEL [01489 CPT(R)] HEPATITIS C AB [51434 CPT(R)] LIPID PANEL [97212 CPT(R)] REFERRAL TO ORTHOPEDICS [ENB554 Custom] TETANUS, DIPHTHERIA TOXOIDS AND ACELLULAR PERTUSSIS VACCINE (TDAP), IN INDIVIDS. >=7, IM V6129056 CPT(R)] Follow-up Instructions Return in about 6 weeks (around 12/15/2017) for bp check. Referral Information Referral ID Referred By Referred To  
  
 [id number] Siomara Ayers [facility]opaedic [facility]   
   [address] [address] [facility], [facility] [address], [address] [facility] Visits Status Start Date End Date 1 New Request 11/3/17 11/3/18 If your referral has a status of pending review or denied, additional information will be sent to support the outcome of this decision. Patient Instructions Low Sodium Diet (2,000 Milligram): Care Instructions Your Care Instructions Too much sodium causes your body to hold on to extra water. This can raise your blood pressure and force your heart and kidneys to work harder. In very serious cases, this could cause you to be put in the hospital. It might even be life-threatening. By limiting sodium, you will feel better and lower your risk of serious problems. The most common source of sodium is salt. People get most of the salt in their diet from canned, prepared, and packaged foods. Fast food and restaurant meals also are very high in sodium. Your doctor will probably limit your sodium to less than 2,000 milligrams (mg) a day. This limit counts all the sodium in prepared and packaged foods and any salt you add to your food. Follow-up care is a key part of your treatment and safety. Be sure to make and go to all appointments, and call your doctor if you are having problems. It's also a good idea to know your test results and keep a list of the medicines you take. How can you care for yourself at home? Read food labels · Read labels on cans and food packages. The labels tell you how much sodium is in each serving. Make sure that you look at the serving size. If you eat more than the serving size, you have eaten more sodium. · Food labels also tell you the Percent Daily Value for sodium. Choose products with low Percent Daily Values for sodium. · Be aware that sodium can come in forms other than salt, including monosodium glutamate (MSG), sodium citrate, and sodium bicarbonate (baking soda). MSG is often added to Asian food. When you eat out, you can sometimes ask for food without MSG or added salt. Buy low-sodium foods · Buy foods that are labeled \"unsalted\" (no salt added), \"sodium-free\" (less than 5 mg of sodium per serving), or \"low-sodium\" (less than 140 mg of sodium per serving). Foods labeled \"reduced-sodium\" and \"light sodium\" may still have too much sodium. Be sure to read the label to see how much sodium you are getting. · Buy fresh vegetables, or frozen vegetables without added sauces. Buy low-sodium versions of canned vegetables, soups, and other canned goods. Prepare low-sodium meals · Cut back on the amount of salt you use in cooking. This will help you adjust to the taste. Do not add salt after cooking. One teaspoon of salt has about 2,300 mg of sodium. · Take the salt shaker off the table. · Flavor your food with garlic, lemon juice, onion, vinegar, herbs, and spices. Do not use soy sauce, lite soy sauce, steak sauce, onion salt, garlic salt, celery salt, mustard, or ketchup on your food. · Use low-sodium salad dressings, sauces, and ketchup. Or make your own salad dressings and sauces without adding salt. · Use less salt (or none) when recipes call for it. You can often use half the salt a recipe calls for without losing flavor. Other foods such as rice, pasta, and grains do not need added salt. · Rinse canned vegetables, and cook them in fresh water. This removes some-but not all-of the salt. · Avoid water that is naturally high in sodium or that has been treated with water softeners, which add sodium. Call your local water company to find out the sodium content of your water supply. If you buy bottled water, read the label and choose a sodium-free brand. Avoid high-sodium foods · Avoid eating: ¨ Smoked, cured, salted, and canned meat, fish, and poultry. ¨ Ham, uriarte, hot dogs, and luncheon meats. ¨ Regular, hard, and processed cheese and regular peanut butter. ¨ Crackers with salted tops, and other salted snack foods such as pretzels, chips, and salted popcorn. ¨ Frozen prepared meals, unless labeled low-sodium. ¨ Canned and dried soups, broths, and bouillon, unless labeled sodium-free or low-sodium. ¨ Canned vegetables, unless labeled sodium-free or low-sodium. ¨ Western Shantel fries, pizza, tacos, and other fast foods. ¨ Pickles, olives, ketchup, and other condiments, especially soy sauce, unless labeled sodium-free or low-sodium. Where can you learn more? Go to http://darlin-breann.info/. Enter C382 in the search box to learn more about \"Low Sodium Diet (2,000 Milligram): Care Instructions. \" Current as of: May 12, 2017 Content Version: 11.4 © 2546-5516 [employer]. Care instructions adapted under license by DietBetter (which disclaims liability or warranty for this information). If you have questions about a medical condition or this instruction, always ask your healthcare professional. [place] [address] any warranty or liability for your use of this information. Introducing [facility]! Dai Shannon introduces Tiberium patient portal. Now you can access parts of your medical record, email your doctor's office, and request medication refills online. 1. In your internet browser, go to [email or web address] 2. Click on the First Time User? Click Here link in the Sign In box. You will see the New Member Sign Up page. 3. Enter your Tiberium Access Code exactly as it appears below. You will not need to use this code after youve completed the sign-up process. If you do not sign up before the expiration date, you must request a new code. · Tiberium Access Code: [email or web address] Expires: 2/1/2018  9:00 AM 
 
4. Enter the last four digits of your Social Security Number (xxxx) and Date of Birth (mm/dd/yyyy) as indicated and click Submit. You will be taken to the next sign-up page. 5. Create a Tiberium ID.  This will be your Tiberium login ID and cannot be changed, so think of one that is secure and easy to remember. 6. Create a OFERTALDIA password. You can change your password at any time. 7. Enter your Password Reset Question and Answer. This can be used at a later time if you forget your password. 8. Enter your e-mail address. You will receive e-mail notification when new information is available in [address] 9. Click Sign Up. You can now view and download portions of your medical record. 10. Click the Download Summary menu link to download a portable copy of your medical information. If you have questions, please visit the Frequently Asked Questions section of the OFERTALDIA website. Remember, OFERTALDIA is NOT to be used for urgent needs. For medical emergencies, dial 911. Now available from your iPhone and Android! Please provide this summary of care documentation to your next provider. Your primary care clinician is listed as Nicolle Gan. If you have any questions after today's visit, please call [phone].

## 2017-11-04 LAB
ALBUMIN SERPL-MCNC: 3.9 G/DL (ref 3.5–5.5)
ALP SERPL-CCNC: 155 IU/L (ref 39–117)
ALT SERPL-CCNC: 192 IU/L (ref 0–32)
AST SERPL-CCNC: 160 IU/L (ref 0–40)
BILIRUB DIRECT SERPL-MCNC: 0.19 MG/DL (ref 0–0.4)
BILIRUB SERPL-MCNC: 0.5 MG/DL (ref 0–1.2)
CHOLEST SERPL-MCNC: 167 MG/DL (ref 100–199)
HCV AB S/CO SERPL IA: >11 S/CO RATIO (ref 0–0.9)
HDLC SERPL-MCNC: 29 MG/DL
INTERPRETATION, 910389: NORMAL
LDLC SERPL CALC-MCNC: 114 MG/DL (ref 0–99)
PROT SERPL-MCNC: 8.5 G/DL (ref 6–8.5)
TRIGL SERPL-MCNC: 120 MG/DL (ref 0–149)
VLDLC SERPL CALC-MCNC: 24 MG/DL (ref 5–40)

## 2017-11-06 ENCOUNTER — TELEPHONE (OUTPATIENT)
Dept: INTERNAL MEDICINE CLINIC | Age: 37
End: 2017-11-06

## 2017-11-06 DIAGNOSIS — B18.2 CHRONIC HEPATITIS C WITHOUT HEPATIC COMA (HCC): Primary | ICD-10-CM

## 2017-11-06 NOTE — TELEPHONE ENCOUNTER
Pt called hep c ab + and lfts elevated. Will call lab to try to add on hep C quant and genotype. Referral for hepatologist done.  Will call her back

## 2017-11-11 LAB
HCV GENOTYPE: NORMAL
HCV GENTYP SERPL NAA+PROBE: NORMAL
HCV RNA SERPL NAA+PROBE-ACNC: NORMAL IU/ML
HCV RNA SERPL NAA+PROBE-LOG IU: 6.83 LOG10 IU/ML
PLEASE NOTE, 550474: NORMAL
TEST INFORMATION: NORMAL

## 2017-11-17 ENCOUNTER — TELEPHONE (OUTPATIENT)
Dept: INTERNAL MEDICINE CLINIC | Age: 37
End: 2017-11-17

## 2017-12-20 ENCOUNTER — OFFICE VISIT (OUTPATIENT)
Dept: INTERNAL MEDICINE CLINIC | Age: 37
End: 2017-12-20

## 2017-12-20 VITALS
HEART RATE: 83 BPM | SYSTOLIC BLOOD PRESSURE: 122 MMHG | BODY MASS INDEX: 20.22 KG/M2 | DIASTOLIC BLOOD PRESSURE: 94 MMHG | TEMPERATURE: 97.9 F | HEIGHT: 64 IN | RESPIRATION RATE: 98 BRPM | WEIGHT: 118.4 LBS

## 2017-12-20 DIAGNOSIS — R73.09 ELEVATED GLUCOSE: ICD-10-CM

## 2017-12-20 DIAGNOSIS — M19.90 ARTHRITIS: ICD-10-CM

## 2017-12-20 DIAGNOSIS — I10 ESSENTIAL HYPERTENSION: Primary | ICD-10-CM

## 2017-12-20 DIAGNOSIS — B18.2 CHRONIC HEPATITIS C WITHOUT HEPATIC COMA (HCC): ICD-10-CM

## 2017-12-20 RX ORDER — CHLORTHALIDONE 25 MG/1
25 TABLET ORAL DAILY
Qty: 30 TAB | Refills: 3 | Status: SHIPPED | OUTPATIENT
Start: 2017-12-20 | End: 2018-02-22 | Stop reason: SINTOL

## 2017-12-20 RX ORDER — LOSARTAN POTASSIUM 50 MG/1
50 TABLET ORAL DAILY
Qty: 30 TAB | Refills: 3 | Status: SHIPPED | OUTPATIENT
Start: 2017-12-20 | End: 2019-03-23 | Stop reason: SDUPTHER

## 2017-12-20 NOTE — PROGRESS NOTES
1. Have you been to the ER, urgent care clinic since your last visit? Hospitalized since your last visit? No.    2. Have you seen or consulted any other health care providers outside of the [address] [place] [address] since your last visit? Include any pap smears or colon screening.  No.

## 2017-12-20 NOTE — MR AVS SNAPSHOT
Visit Information Date & Time Provider Department Dept. Phone Encounter #  
 12/20/2017 11:40 AM Cristobal Richards, [address] [facility] [address] [id number] Follow-up Instructions Return in about 3 months (around 3/20/2018). Your Appointments 1/26/2018  2:30 PM  
New Patient with MD David Jay 75 ([facility]) Appt Note: NP HEP C DR. Alexia Bocanegra [phone] PT scheduled [facility] 11/22/2017 200 [facility] [phone] [place] [phone]  
[address] [facility] Upcoming Health Maintenance Date Due Pneumococcal 19-64 Highest Risk (1 of 3 - PCV13) 9/5/2000 PAP AKA CERVICAL CYTOLOGY 9/5/2002 DTaP/Tdap/Td series (2 - Td) 11/3/2027 Allergies as of 12/20/2017  Review Complete On: 12/20/2017 By: Cristobal Richards MD  
  
 Severity Noted Reaction Type Reactions Nsaids (Non-steroidal Anti-inflammatory Drug)  09/25/2011    Unknown (comments) Dr. Reynold Boles told me no NSAID's because of hypertension problems. Current Immunizations  Reviewed on 11/3/2017 Name Date Tdap 11/3/2017 Not reviewed this visit You Were Diagnosed With   
  
 Codes Comments Essential hypertension    -  Primary ICD-10-CM: I10 
ICD-9-CM: 401.9 Chronic hepatitis C without hepatic coma (HCC)     ICD-10-CM: B18.2 ICD-9-CM: 070.54 Elevated glucose     ICD-10-CM: R73.09 
ICD-9-CM: 790.29 Arthritis     ICD-10-CM: M19.90 ICD-9-CM: 716.90 Vitals BP Pulse Temp Resp Height(growth percentile) Weight(growth percentile) (!) 122/94 (BP 1 Location: Right arm, BP Patient Position: Sitting) 83 97.9 °F (36.6 °C) (Oral) (!) 98 5' 4\" (1.626 m) 118 lb 6.4 oz (53.7 kg) BMI OB Status Smoking Status 20.32 kg/m2 Hysterectomy Current Every Day Smoker Vitals History BMI and BSA Data  Body Mass Index Body Surface Area  
 20.32 kg/m 2 1.56 m 2  
  
 Preferred Pharmacy Pharmacy Name Phone [id number]- [place], [address] Celeste Montoya [phone] Your Updated Medication List  
  
   
This list is accurate as of: 12/20/17 12:09 PM.  Always use your most recent med list.  
  
  
  
  
 chlorthalidone 25 mg tablet Commonly known as:  [place] Take 1 Tab by mouth daily. estradiol 2 mg tablet Commonly known as:  ESTRACE Take 2 mg by mouth daily. FLUoxetine 20 mg tablet Commonly known as:  PROzac Take 1 Tab by mouth daily. gabapentin 100 mg capsule Commonly known as:  NEURONTIN Take 1 Cap by mouth three (3) times daily. losartan 50 mg tablet Commonly known as:  COZAAR Take 1 Tab by mouth daily. methadone 10 mg tablet Commonly known as:  DOLOPHINE Take 8 Tabs by mouth daily. Max Daily Amount: 80 mg.  
  
 sertraline 50 mg tablet Commonly known as:  ZOLOFT Take 1 Tab by mouth daily. Indications: ANXIETY WITH DEPRESSION Prescriptions Sent to Pharmacy Refills  
 losartan (COZAAR) 50 mg tablet 3 Sig: Take 1 Tab by mouth daily. Class: Normal  
 Pharmacy: [facility]/pharmacy [address] [place] [address] Ph #: [phone] Route: Oral  
 chlorthalidone (HYGROTEN) 25 mg tablet 3 Sig: Take 1 Tab by mouth daily. Class: Normal  
 Pharmacy: [address] Ph #: [phone] Route: Oral  
  
We Performed the Following HEMOGLOBIN A1C WITH EAG [49316 CPT(R)] REFERRAL TO RHEUMATOLOGY [JFR87 Custom] Comments:  
 [facility] 
[facility] [address] 
[phone] REFERRAL TO RHEUMATOLOGY [WTC72 Custom] Comments: [facility] Follow-up Instructions Return in about 3 months (around 3/20/2018). Referral Information Referral ID Referred By Referred To  
  
 [id number] LIBORIO [address] [place] [address] [facility], [address] Visits Status Start Date End Date 1 New Request 12/20/17 12/20/18 If your referral has a status of pending review or denied, additional information will be sent to support the outcome of this decision. Referral ID Referred By Referred To  
 [id number] LIBORIO, [address] [facility] [address], [address] Visits Status Start Date End Date 1 New Request 12/20/17 12/20/18 If your referral has a status of pending review or denied, additional information will be sent to support the outcome of this decision. Introducing [address]! Ke Demarco introduces [employer] patient portal. Now you can access parts of your medical record, email your doctor's office, and request medication refills online. 1. In your internet browser, go to [email or web address] ([email or web address])t 2. Click on the First Time User? Click Here link in the Sign In box. You will see the New Member Sign Up page. 3. Enter your [employer] Access Code exactly as it appears below. You will not need to use this code after youve completed the sign-up process. If you do not sign up before the expiration date, you must request a new code. · [employer] Access Code: [email or web address] Expires: 2/1/2018  8:00 AM 
 
4. Enter the last four digits of your Social Security Number (xxxx) and Date of Birth (mm/dd/yyyy) as indicated and click Submit. You will be taken to the next sign-up page. 5. Create a Intelliot ID. This will be your [employer] login ID and cannot be changed, so think of one that is secure and easy to remember. 6. Create a Intelliot password. You can change your password at any time. 7. Enter your Password Reset Question and Answer. This can be used at a later time if you forget your password. 8. Enter your e-mail address. You will receive e-mail notification when new information is available in [address] 9. Click Sign Up. You can now view and download portions of your medical record. 10. Click the Download Summary menu link to download a portable copy of your medical information. If you have questions, please visit the Frequently Asked Questions section of the Liberata website. Remember, Liberata is NOT to be used for urgent needs. For medical emergencies, dial 911. Now available from your iPhone and Android! Please provide this summary of care documentation to your next provider. Your primary care clinician is listed as Ashley Jacobson. If you have any questions after today's visit, please call [phone].

## 2017-12-20 NOTE — PROGRESS NOTES
Merissa Selby is a 40 y.o. female and presents with Hypertension; Follow-up; and Medication Refill  . Subjective:    HTN-pt stopped taking her bp medication due to SE   -she saw [facility] hepatology and they recommended she have a w/u for secondary causes of htn due to early age at dx   -pts bp has been relatively well controlled on 2 anti htn     Hand stiffness and pain-pt was recomended to see rheum by U    Hep C-has been seeing GI @ [facility]    Pt did NOT make an appointment w ortho bc she started experiencing the same sxs in ALL her fingers and was referred to rheum      Review of Systems  Review of systems (12) negative, except noted above. Past Medical History:   Diagnosis Date    ex- IVDU (intravenous drug user)     H/O hysterectomy for benign disease     Hypertension     [facility], [facility]     Methadone maintenance therapy patient ([facility]     Other ill-defined conditions(799.89) ovarian cyst    PTSD (post-traumatic stress disorder)     Thalassemia      Past Surgical History:   Procedure Laterality Date    HX GYN      HX HEENT      t&a    HX HYSTERECTOMY       Social History     Social History    Marital status: SINGLE     Spouse name: N/A    Number of children: N/A    Years of education: N/A     Social History Main Topics    Smoking status: Current Every Day Smoker     Packs/day: 0.50    Smokeless tobacco: Current User    Alcohol use No    Drug use: No    Sexual activity: Not Currently     Birth control/ protection: None     Other Topics Concern    None     Social History Narrative     Family History   Problem Relation Age of Onset   [de-identified] Hypertension Mother     Hypertension Sister     Asthma Sister     Heart Disease Sister     Diabetes Maternal Aunt     Diabetes Maternal Uncle      Current Outpatient Prescriptions   Medication Sig Dispense Refill    losartan (COZAAR) 50 mg tablet Take 1 Tab by mouth daily.  30 Tab 3    chlorthalidone (HYGROTEN) 25 mg tablet Take 1 Tab by mouth daily. 30 Tab 3    methadone (DOLOPHINE) 10 mg tablet Take 8 Tabs by mouth daily. Max Daily Amount: 80 mg. 1 Tab 0    sertraline (ZOLOFT) 50 mg tablet Take 1 Tab by mouth daily. Indications: ANXIETY WITH DEPRESSION 1 Tab 0    gabapentin (NEURONTIN) 100 mg capsule Take 1 Cap by mouth three (3) times daily. 1 Cap 0    FLUoxetine (PROZAC) 20 mg tablet Take 1 Tab by mouth daily. 1 Tab 0    estradiol (ESTRACE) 2 mg tablet Take 2 mg by mouth daily. Allergies   Allergen Reactions    Nsaids (Non-Steroidal Anti-Inflammatory Drug) Unknown (comments)     Dr. Ivy Bui told me no NSAID's because of hypertension problems. Objective:  Visit Vitals    BP (!) 122/94 (BP 1 Location: Right arm, BP Patient Position: Sitting)    Pulse 83    Temp 97.9 °F (36.6 °C) (Oral)    Resp (!) 98    Ht 5' 4\" (1.626 m)    Wt 118 lb 6.4 oz (53.7 kg)    BMI 20.32 kg/m2     Physical Exam:   General appearance - alert, well appearing, and in no distress s  Mental status - alert, oriented to person, place, and time  EYE-EOMI  Neck - supple,   Chest - symmetric air entry    Abdomen - obese  Ext-edna hands thick  Skin-Warm and dry. no hyperpigmentation, vitiligo, or suspicious lesions  Neuro -alert, oriented, normal speech, no focal findings or movement disorder noted        Results for orders placed or performed in visit on 11/06/17   HEPATITIS C QT BY PCR WITH REFLEX GENOTYPE   Result Value Ref Range    Hepatitis C Quantitation [id number] IU/mL    HCV log10 6.834 log10 IU/mL    TEST INFORMATION Comment     HCV Genotype Comment    HEPATITIS C GENOTYPE   Result Value Ref Range    Hepatitis C Genotype 1a     Please note Comment        Assessment/Plan:    ICD-10-CM ICD-9-CM    1. Essential hypertension I10 401.9    2. Chronic hepatitis C without hepatic coma (HCC) B18.2 070.54    3. Elevated glucose R73.09 790.29 HEMOGLOBIN A1C WITH EAG   4.  Arthritis M19.90 716.90 REFERRAL TO RHEUMATOLOGY      REFERRAL TO RHEUMATOLOGY     Orders Placed This Encounter    HEMOGLOBIN A1C WITH EAG    REFERRAL TO RHEUMATOLOGY     Referral Priority:   Routine     Referral Type:   Consultation     Referral Reason:   Specialty Services Required     Referral Location:   [facility]     Requested Specialty:   Rheumatology    REFERRAL TO RHEUMATOLOGY     Referral Priority:   Routine     Referral Type:   Consultation     Referral Reason:   Specialty Services Required     Referral Location:   [facility]     Requested Specialty:   Rheumatology    losartan (COZAAR) 50 mg tablet     Sig: Take 1 Tab by mouth daily. Dispense:  30 Tab     Refill:  3    chlorthalidone (HYGROTEN) 25 mg tablet     Sig: Take 1 Tab by mouth daily. Dispense:  30 Tab     Refill:  3     bp medication adjusted  Referred to rheum  Check A1c due to elevated glucose  F/u 3 mths/prn  Will defer w/u secondary causes bp due to relatively well controlled and NOT fluctuating and strong FH htn. Can revisit AFTER rheum eval  There are no Patient Instructions on file for this visit. Follow-up Disposition:  Return in about 3 months (around 3/20/2018). I have reviewed with the patient details of the assessment and plan and all questions were answered. Relevent patient education was performed. The most recent lab findings were reviewed with the patient. An After Visit Summary was printed and given to the patient.

## 2017-12-28 LAB
EST. AVERAGE GLUCOSE BLD GHB EST-MCNC: 111 MG/DL
HBA1C MFR BLD: 5.5 % (ref 4.8–5.6)

## 2018-02-22 ENCOUNTER — OFFICE VISIT (OUTPATIENT)
Dept: INTERNAL MEDICINE CLINIC | Age: 38
End: 2018-02-22

## 2018-02-22 VITALS
DIASTOLIC BLOOD PRESSURE: 92 MMHG | TEMPERATURE: 97.9 F | WEIGHT: 120.9 LBS | OXYGEN SATURATION: 99 % | HEIGHT: 64 IN | SYSTOLIC BLOOD PRESSURE: 122 MMHG | HEART RATE: 78 BPM | BODY MASS INDEX: 20.64 KG/M2 | RESPIRATION RATE: 18 BRPM

## 2018-02-22 DIAGNOSIS — B18.2 CHRONIC HEPATITIS C WITHOUT HEPATIC COMA (HCC): ICD-10-CM

## 2018-02-22 DIAGNOSIS — R07.89 ATYPICAL CHEST PAIN: Primary | ICD-10-CM

## 2018-02-22 DIAGNOSIS — I10 ESSENTIAL HYPERTENSION: ICD-10-CM

## 2018-02-22 DIAGNOSIS — F17.200 SMOKER: ICD-10-CM

## 2018-02-22 DIAGNOSIS — F11.20 METHADONE MAINTENANCE THERAPY PATIENT (HCC): ICD-10-CM

## 2018-02-22 DIAGNOSIS — N20.0 NEPHROLITHIASIS: ICD-10-CM

## 2018-02-22 DIAGNOSIS — Z91.199 NONCOMPLIANCE: ICD-10-CM

## 2018-02-22 RX ORDER — NICOTINE 7MG/24HR
1 PATCH, TRANSDERMAL 24 HOURS TRANSDERMAL EVERY 24 HOURS
Qty: 14 PATCH | Refills: 0 | Status: SHIPPED | OUTPATIENT
Start: 2018-02-22 | End: 2018-03-24

## 2018-02-22 RX ORDER — IBUPROFEN 200 MG
1 TABLET ORAL EVERY 24 HOURS
Qty: 14 PATCH | Refills: 0 | Status: SHIPPED | OUTPATIENT
Start: 2018-02-22 | End: 2018-03-24

## 2018-02-22 RX ORDER — METOPROLOL SUCCINATE 50 MG/1
50 TABLET, EXTENDED RELEASE ORAL
Qty: 30 TAB | Refills: 5 | Status: SHIPPED | OUTPATIENT
Start: 2018-02-22 | End: 2018-10-15 | Stop reason: SDUPTHER

## 2018-02-22 NOTE — PROGRESS NOTES
Jenny Calderon is a 40 y.o. female and presents with Hypertension and Follow-up (K[facility])  . Subjective:    Pt has multiple concerns: For f/u. Pt is seeing hepatology @ [facility] and ct scan demonstrated small,  non-obstructing rt renal calculus. Pt has a h/o kidney stones, but is currently asymptomatic. Pt had 3 episoded of rt sided cp w radiation up jaw upon awakening from sleep. Lasting ~ 10 minites. . She took some ASA and sxs resolved. Pt denies palpitations/le edema/BRENNER/orthopnea/rash. .the patient relays she has a h/o same yrs ago. She has never sought medical attention. Pt denies substance abuse recently      Hypertension Review:  The patient has essential hypertension  Diet and Lifestyle: generally follows a  low sodium diet, exercises sporadically  Home BP Monitoring: is not measured at home. Pertinent ROS: pt is NOT taking chlorthalidone due to increased urination and she is unsure if she is taking losartan  BP Readings from Last 3 Encounters:   02/22/18 (!) 122/92   12/20/17 (!) 122/94   11/03/17 (!) 123/92     Pt saw rheumatology for hand swelling and was given  A cortisone shot and prednisone. ? Hep C related     Pt requests nicoderm for smoking cessation    Review of Systems  Constitutional: negative for fevers, chills, anorexia and weight loss  Eyes:   negative for visual disturbance and irritation  ENT:   negative for tinnitus,sore throat,nasal congestion,ear pains. hoarseness  Respiratory:  negative for cough, hemoptysis, dyspnea,wheezing  CV:   negative for chest pain, palpitations, lower extremity edema  GI:   negative for nausea, vomiting, diarrhea, abdominal pain,melena  Musculoskel: negative for myalgias, arthralgias, back pain, muscle weakness, joint pain  Neurological:  negative for headaches, dizziness, vertigo, memory problems and gait   Behavl/Psych: negative for feelings of anxiety, depression, mood changes    Past Medical History:   Diagnosis Date    ex- IVDU (intravenous drug user)     H/O hysterectomy for benign disease     Hypertension     [facility], recurrent ([facility]     Methadone maintenance therapy patient ([facility]     Other ill-defined conditions(799.89) ovarian cyst    PTSD (post-traumatic stress disorder)     Thalassemia      Past Surgical History:   Procedure Laterality Date    HX GYN      HX HEENT      t&a    HX HYSTERECTOMY       Social History     Social History    Marital status: SINGLE     Spouse name: N/A    Number of children: N/A    Years of education: N/A     Social History Main Topics    Smoking status: Current Every Day Smoker     Packs/day: 0.50    Smokeless tobacco: Current User    Alcohol use No    Drug use: No    Sexual activity: Not Currently     Birth control/ protection: None     Other Topics Concern    None     Social History Narrative     Family History   Problem Relation Age of Onset    Hypertension Mother     Hypertension Sister     Asthma Sister     Heart Disease Sister     Diabetes Maternal Aunt     Diabetes Maternal Uncle      Current Outpatient Prescriptions   Medication Sig Dispense Refill    metoprolol succinate (TOPROL-XL) 50 mg XL tablet Take 1 Tab by mouth nightly. 30 Tab 5    nicotine (NICODERM CQ) 14 mg/24 hr patch 1 Patch by TransDERmal route every twenty-four (24) hours for 30 days. 14 Patch 0    nicotine (NICODERM CQ) 7 mg/24 hr 1 Patch by TransDERmal route every twenty-four (24) hours for 30 days. 14 Patch 0    methadone (DOLOPHINE) 10 mg tablet Take 8 Tabs by mouth daily. Max Daily Amount: 80 mg. 1 Tab 0    losartan (COZAAR) 50 mg tablet Take 1 Tab by mouth daily. 30 Tab 3    estradiol (ESTRACE) 2 mg tablet Take 2 mg by mouth daily. Allergies   Allergen Reactions    Nsaids (Non-Steroidal Anti-Inflammatory Drug) Unknown (comments)     Dr. Lydia Crane told me no NSAID's because of hypertension problems.         Objective:  Visit Vitals    BP (!) 122/92 (BP 1 Location: Left arm, BP Patient Position: Sitting)  Pulse 78    Temp 97.9 °F (36.6 °C) (Oral)    Resp 18    Ht 5' 4\" (1.626 m)    Wt 120 lb 14.4 oz (54.8 kg)    SpO2 99%    BMI 20.75 kg/m2     Physical Exam:   General appearance - thin, sluggish in NAD  Mental status - alert, oriented to person, place, and time  EYE-EOMI  Neck - supple, no significant adenopathy   Chest - clear to auscultation, no wheezes, rales or rhonchi, symmetric air entry   Heart - normal rate, regular rhythm, normal S1, S2  Ext-peripheral pulses normal, no pedal edema, no clubbing or cyanosis + bilfingers swollen  Skin-Warm and dry. no hyperpigmentation, vitiligo, or suspicious lesions  Neuro -alert, oriented, normal speech, no focal findings or movement disorder noted        Results for orders placed or performed in visit on 17   HEMOGLOBIN A1C WITH EAG   Result Value Ref Range    Hemoglobin A1c 5.5 4.8 - 5.6 %    Estimated average glucose 111 mg/dL       Assessment/Plan:    ICD-10-CM ICD-9-CM    1. Atypical chest pain R07.89 786.59 REFERRAL TO CARDIOLOGY   2. Chronic hepatitis C without hepatic coma (HCC) B18.2 070.54    3. Nephrolithiasis N20.0 592.0    4. Essential hypertension I10 401.9    5. Smoker F17.200 305.1 nicotine (NICODERM CQ) 14 mg/24 hr patch      nicotine (NICODERM CQ) 7 mg/24 hr   6. Methadone maintenance therapy patient ([facility] F11.20 304.00    7. Noncompliance Z91.19 V15.81      Orders Placed This Encounter    REFERRAL TO CARDIOLOGY     Referral Priority:   Routine     Referral Type:   Consultation     Referral Reason:   Specialty Services Required     Requested Specialty:   Cardiology    metoprolol succinate (TOPROL-XL) 50 mg XL tablet     Sig: Take 1 Tab by mouth nightly. Dispense:  30 Tab     Refill:  5    nicotine (NICODERM CQ) 14 mg/24 hr patch     Si Patch by TransDERmal route every twenty-four (24) hours for 30 days.      Dispense:  14 Patch     Refill:  0    nicotine (NICODERM CQ) 7 mg/24 hr     Si Patch by TransDERmal route every twenty-four (24) hours for 30 days. Dispense:  14 Patch     Refill:  0     D/c chlorthalidone and start metoprolol  Cont losartan  BRING IN ALL MEDS NV  Cardiology referral given  GO TO ED  if sxs recur  Cont rheum/hepatology f/u  nicoderm rx sent to pharmacy  There are no Patient Instructions on file for this visit. Follow-up Disposition:  Return in about 6 weeks (around 4/5/2018) for bp check w nurse and 3 mths w me. I have reviewed with the patient details of the assessment and plan and all questions were answered. Relevent patient education was performed. The most recent lab findings were reviewed with the patient. An After Visit Summary was printed and given to the patient.

## 2018-02-22 NOTE — MR AVS SNAPSHOT
[facility] 
 
 
 [place] [address] [place] [address] 
[phone] Patient: Lesley Shane MRN: [id number] Visit Information Date & Time Provider Department Dept. Phone Encounter #  
 2/22/2018  9:45 AM Nicole Moralez, [id number] [id number] Follow-up Instructions Return in about 6 weeks (around 4/5/2018) for bp check w nurse and 3 mths w me. Your Appointments 3/21/2018 11:15 AM  
Any with Nicole Moralez MD  
[address]) Appt Note: 3mo f/u; 3mo f/u  
 [place] [address] [place] [address]  
[phone]  
  
   
 [place] [address] [facility] [address] Upcoming Health Maintenance Date Due Pneumococcal 19-64 Highest Risk (1 of 3 - PCV13) 9/5/2000 PAP AKA CERVICAL CYTOLOGY 9/5/2002 DTaP/Tdap/Td series (2 - Td) 11/3/2027 Allergies as of 2/22/2018  Review Complete On: 2/22/2018 By: Nicole Moralez MD  
  
 Severity Noted Reaction Type Reactions Nsaids (Non-steroidal Anti-inflammatory Drug)  09/25/2011    Unknown (comments) Dr. Marcos Sosa told me no NSAID's because of hypertension problems. Current Immunizations  Reviewed on 11/3/2017 Name Date Tdap 11/3/2017 Not reviewed this visit You Were Diagnosed With   
  
 Codes Comments Atypical chest pain    -  Primary ICD-10-CM: R07.89 ICD-9-CM: 786.59 Chronic hepatitis C without hepatic coma (HCC)     ICD-10-CM: B18.2 ICD-9-CM: 070.54 Nephrolithiasis     ICD-10-CM: N20.0 ICD-9-CM: 592.0 Essential hypertension     ICD-10-CM: I10 
ICD-9-CM: 401.9 Vitals BP Pulse Temp Resp Height(growth percentile) Weight(growth percentile) (!) 122/92 (BP 1 Location: Left arm, BP Patient Position: Sitting) 78 97.9 °F (36.6 °C) (Oral) 18 5' 4\" (1.626 m) 120 lb 14.4 oz (54.8 kg) SpO2 BMI OB Status Smoking Status 99% 20.75 kg/m2 Hysterectomy Current Every Day Smoker Vitals History BMI and BSA Data Body Mass Index Body Surface Area 20.75 kg/m 2 1.57 m 2 Preferred Pharmacy Pharmacy Name Phone [id number] , [address] [place] [phone] Your Updated Medication List  
  
   
This list is accurate as of 2/22/18 10:21 AM.  Always use your most recent med list.  
  
  
  
  
 estradiol 2 mg tablet Commonly known as:  ESTRACE Take 2 mg by mouth daily. losartan 50 mg tablet Commonly known as:  COZAAR Take 1 Tab by mouth daily. methadone 10 mg tablet Commonly known as:  DOLOPHINE Take 8 Tabs by mouth daily. Max Daily Amount: 80 mg.  
  
 metoprolol succinate 50 mg XL tablet Commonly known as:  TOPROL-XL Take 1 Tab by mouth nightly. Prescriptions Sent to Pharmacy Refills  
 metoprolol succinate (TOPROL-XL) 50 mg XL tablet 5 Sig: Take 1 Tab by mouth nightly. Class: Normal  
 Pharmacy: [facility]/pharmacy [address] [place] [address] Ph #: [phone] Route: Oral  
  
We Performed the Following REFERRAL TO CARDIOLOGY [HYJ43 Custom] Comments:  
 Please evaluate patient for atypical chest pain Follow-up Instructions Return in about 6 weeks (around 4/5/2018) for bp check w nurse and 3 mths w me. Referral Information Referral ID Referred By Referred To  
  
 [id number] Ron Vaz Not Available Visits Status Start Date End Date 1 New Request 2/22/18 2/22/19 If your referral has a status of pending review or denied, additional information will be sent to support the outcome of this decision. Introducing \[facility]\"" & HEALTH SERVICES! Dear Harmeet Gee: Thank you for requesting a Virtual Web account. Our records indicate that you already have an active Virtual Web account. You can access your account anytime at [email or web address]/[facility] Did you know that you can access your hospital and ER discharge instructions at any time in [employer]? You can also review all of your test results from your hospital stay or ER visit. Additional Information If you have questions, please visit the Frequently Asked Questions section of the [employer] website at [email or web address] Remember, [employer] is NOT to be used for urgent needs. For medical emergencies, dial 911. Now available from your iPhone and Android! Please provide this summary of care documentation to your next provider. Your primary care clinician is listed as Blanca Qureshi. If you have any questions after today's visit, please call [phone].

## 2018-02-22 NOTE — PROGRESS NOTES
1. Have you been to the ER, urgent care clinic since your last visit? Hospitalized since your last visit? No.    2. Have you seen or consulted any other health care providers outside of the [address] [place] [address] since your last visit? Include any pap smears or colon screening.  No.

## 2018-05-16 ENCOUNTER — TELEPHONE (OUTPATIENT)
Dept: INTERNAL MEDICINE CLINIC | Age: 38
End: 2018-05-16

## 2018-05-16 NOTE — TELEPHONE ENCOUNTER
Spoke to patient about cardio referral. Patient states she will reschedule the appointment and will call back with time and date.

## 2019-03-23 ENCOUNTER — OFFICE VISIT (OUTPATIENT)
Dept: FAMILY MEDICINE CLINIC | Age: 39
End: 2019-03-23

## 2019-03-23 VITALS
TEMPERATURE: 98 F | RESPIRATION RATE: 18 BRPM | HEIGHT: 64 IN | OXYGEN SATURATION: 99 % | BODY MASS INDEX: 21.75 KG/M2 | HEART RATE: 65 BPM | WEIGHT: 127.4 LBS | DIASTOLIC BLOOD PRESSURE: 113 MMHG | SYSTOLIC BLOOD PRESSURE: 154 MMHG

## 2019-03-23 DIAGNOSIS — M25.562 CHRONIC PAIN OF BOTH KNEES: ICD-10-CM

## 2019-03-23 DIAGNOSIS — M25.40 JOINT SWELLING: ICD-10-CM

## 2019-03-23 DIAGNOSIS — M25.561 CHRONIC PAIN OF BOTH KNEES: ICD-10-CM

## 2019-03-23 DIAGNOSIS — I10 ESSENTIAL HYPERTENSION: ICD-10-CM

## 2019-03-23 DIAGNOSIS — D56.0 ALPHA THALASSEMIA (HCC): ICD-10-CM

## 2019-03-23 DIAGNOSIS — B18.2 CHRONIC HEPATITIS C WITHOUT HEPATIC COMA (HCC): Primary | ICD-10-CM

## 2019-03-23 DIAGNOSIS — G89.29 CHRONIC PAIN OF BOTH KNEES: ICD-10-CM

## 2019-03-23 RX ORDER — LOSARTAN POTASSIUM 50 MG/1
50 TABLET ORAL DAILY
Qty: 30 TAB | Refills: 3 | Status: SHIPPED | OUTPATIENT
Start: 2019-03-23 | End: 2019-05-09

## 2019-03-23 NOTE — PROGRESS NOTES
HPI:   Mehnaz Mahmood is a 41 y.o. female who presents to establish care. Knee Pain  Patient complains of bilaterally knee pain. Onset of the symptoms was several months ago. Inciting event: none known. Current symptoms include severity of pain = moderate. Associated symptoms: instability. Aggravating symptoms: going up and down stairs. Patient's overall course: stable Patient has had no prior knee problems. Patient denies none. Evaluation to date: none. Treatment to date: Tylenol without improvement. Hypertension  Patient is here for follow-up of hypertension. She indicates that she is feeling well and denies any symptoms referable to her hypertension, including chest pain, palpitations, dyspnea, orthopnea, or peripheral edema. Patient has these side effects of medication: none. She is exercising and is not adherent to low salt diet. Blood pressure is not well controlled at home. Use of agents associated with hypertension: estrogens. History of renal disease: absent. Cardiovascular risk factors: smoking/ tobacco exposure, hypertension, post-menopausal.      She is on chronic methadone managed by [facility]. She has been sober from intravenous drug use for two years. She has a history of hepatitis C. She was previously treated. She has not recently seen hepatology. Current Outpatient Medications   Medication Sig Dispense Refill    losartan (COZAAR) 50 mg tablet Take 1 Tab by mouth daily. 30 Tab 3    metoprolol succinate (TOPROL-XL) 50 mg XL tablet Take 1 Tab by mouth nightly. 90 Tab 1    methadone (DOLOPHINE) 10 mg tablet Take 8 Tabs by mouth daily. Max Daily Amount: 80 mg. 1 Tab 0    estradiol (ESTRACE) 2 mg tablet Take 2 mg by mouth daily.         Allergies   Allergen Reactions    Amlodipine Swelling     Leg swelling    Hydrochlorothiazide Other (comments)     Intolerance: polyuria    Nsaids (Non-Steroidal Anti-Inflammatory Drug) Unknown (comments)     Dr. Milvia Lockwood told me no NSAID's because of hypertension problems. Patient Active Problem List   Diagnosis Code    Essential hypertension I10    Alpha thalassemia ([facility] D56.0    Chronic hepatitis C without hepatic coma ([facility] B18.2    Nephrolithiasis N20.0     Past Medical History:   Diagnosis Date    ex- IVDU (intravenous drug user)     H/O hysterectomy for benign disease     Hypertension     Major depression, recurrent ([facility]     Methadone maintenance therapy patient ([facility]     Other ill-defined conditions(799.89) ovarian cyst    PTSD (post-traumatic stress disorder)     Thalassemia       Past Surgical History:   Procedure Laterality Date    HX GYN      HX HEENT      t&a    HX ARIANA AND BSO  2009    Endometriosis, Uterine Septum      No LMP recorded. Patient has had a hysterectomy.    Family History   Problem Relation Age of Onset    Hypertension Mother     Hypertension Sister     Asthma Sister     Heart Disease Sister     Diabetes Maternal Aunt     Diabetes Maternal Uncle     Other Son         Lactose Intolerance    Asthma Daughter     Other Daughter         Alpha Thalessmia      Social History     Socioeconomic History    Marital status: SINGLE     Spouse name: Not on file    Number of children: Not on file    Years of education: Not on file    Highest education level: Not on file   Occupational History    Not on file   Social Needs    Financial resource strain: Not on file    Food insecurity:     Worry: Not on file     Inability: Not on file    Transportation needs:     Medical: Not on file     Non-medical: Not on file   Tobacco Use    Smoking status: Current Every Day Smoker     Packs/day: 0.50    Smokeless tobacco: Current User   Substance and Sexual Activity    Alcohol use: No    Drug use: No    Sexual activity: Not Currently     Birth control/protection: None   Lifestyle    Physical activity:     Days per week: Not on file     Minutes per session: Not on file    Stress: Not on file   Relationships    Social connections:     Talks on phone: Not on file     Gets together: Not on file     Attends Yazidism service: Not on file     Active member of club or organization: Not on file     Attends meetings of clubs or organizations: Not on file     Relationship status: Not on file    Intimate partner violence:     Fear of current or ex partner: Not on file     Emotionally abused: Not on file     Physically abused: Not on file     Forced sexual activity: Not on file   Other Topics Concern    Not on file   Social History Narrative    Not on file        ROS:   Review of Systems   Constitutional: Negative for fever, malaise/fatigue and weight loss. HENT: Negative for hearing loss. Eyes: Negative for blurred vision and pain. Respiratory: Negative for cough and shortness of breath. Cardiovascular: Negative for chest pain, palpitations and leg swelling. Gastrointestinal: Negative for abdominal pain, blood in stool, constipation, diarrhea and melena. Genitourinary: Negative for dysuria and hematuria. Musculoskeletal: Positive for joint pain. Skin: Negative for rash. Neurological: Negative for headaches. Psychiatric/Behavioral: Negative for depression. The patient is not nervous/anxious and does not have insomnia. Physical Exam:     Visit Vitals  BP (!) 154/113   Pulse 65   Temp 98 °F (36.7 °C) (Oral)   Resp 18   Ht 5' 4\" (1.626 m)   Wt 127 lb 6.4 oz (57.8 kg)   SpO2 99%   BMI 21.87 kg/m²        Vitals and Nurse Documentation reviewed. Physical Exam   Constitutional: No distress. HENT:   Right Ear: No drainage. Tympanic membrane is not injected, not erythematous and not bulging. No middle ear effusion. Left Ear: No drainage. Tympanic membrane is not injected, not erythematous and not bulging. No middle ear effusion. Nose: No mucosal edema or rhinorrhea. Mouth/Throat: Normal dentition. No oropharyngeal exudate, posterior oropharyngeal erythema or tonsillar abscesses.    Eyes: Pupils are equal, round, and reactive to light. Neck: No JVD present. Carotid bruit is not present. No tracheal deviation present. No thyroid mass and no thyromegaly present. Cardiovascular: S1 normal and S2 normal. Exam reveals no gallop and no friction rub. No murmur heard. Pulses:       Dorsalis pedis pulses are 2+ on the right side, and 2+ on the left side. Posterior tibial pulses are 2+ on the right side, and 2+ on the left side. Pulmonary/Chest: Breath sounds normal. She has no wheezes. Abdominal: Soft. Bowel sounds are normal. She exhibits no distension and no mass. There is no hepatosplenomegaly. There is no tenderness. Musculoskeletal:        Right knee: She exhibits decreased range of motion, swelling, effusion and erythema. Left knee: She exhibits decreased range of motion, swelling, effusion and erythema. Lymphadenopathy:     She has no cervical adenopathy. She has no axillary adenopathy. Neurological: She has normal motor skills, normal sensation and normal strength. No cranial nerve deficit. Gait normal.   Skin: Skin is warm and dry. Psychiatric: Mood, memory, affect and judgment normal.         Assessment/ Plan:   [employer] were discussed including hours of operation, on-call providers, and MyChart. Diagnoses and all orders for this visit:    1. Chronic hepatitis C without hepatic coma (HCC)  -     HEPATITIS C QT BY PCR WITH REFLEX GENOTYPE  -     METABOLIC PANEL, COMPREHENSIVE  Labs pending. She has been previously treated and should likely be in remission. 2. Alpha thalassemia (HCC)  -     IRON PROFILE  -     CBC WITH AUTOMATED DIFF  -     HEMOGLOBIN A1C WITH EAG  -     TSH 3RD GENERATION  Labs pending. Consider hematology evaluation as well    3. Chronic pain of both knees  -     XR KNEE LT 3 V; Future  -     SED RATE (ESR)  -     REFERRAL TO ORTHOPEDICS  May need rheumatology based on labs. X-ray today. Treatment will be based on results.     4. Essential hypertension  -     losartan (COZAAR) 50 mg tablet; Take 1 Tab by mouth daily. Uncontrolled. Restart losartan as above. Follow-up in 1 month or sooner as needed. We will not continue oral estrogens at this time secondary to her multiple stroke risk factors. She states understanding. I have strongly encouraged her to quit smoking. 5. Joint swelling  -     FRANK BY MULTIPLEX FLOW IA, QL      Time: 40 minutes was spent with this patient face to face discussing care with greater than 50% of this time was spent in counseling and coordination of care regarding primary stroke prevention, tobacco cessation, and medication management. Follow-up and Dispositions    · Return in about 2 weeks (around 4/6/2019) for Follow Up 30 minutes.

## 2019-03-23 NOTE — PATIENT INSTRUCTIONS
Learning About How to Prevent a Stroke  What is a stroke? A stroke occurs when a blood vessel in the brain bursts or is blocked by a blood clot. Without blood and the oxygen it carries, part of the brain starts to die. The part of the body controlled by the damaged area of the brain can't work properly. But there are many things you can do to help lower your stroke risk. What increases your risk for stroke? A risk factor is anything that makes you more likely to have a particular health problem. Risk factors for stroke that you can treat or change include:  · Health problems like high blood pressure, atrial fibrillation, diabetes, and high cholesterol. · Smoking. · Heavy use of alcohol. · Being overweight. · Not getting enough physical activity. Risk factors you can't change include:  · Age. The risk of stroke goes up as you get older. · Race.  Americans, Native Americans, and [place] Natives have a higher risk than those of other races. · Being female. Women have a higher risk of stroke than men. · Family history of stroke. Your doctor can help you know your risk. Then you and your can doctor talk about whether you need to lower it. What can you do to prevent a stroke? · Treat any health problems you have that raise your risk. · Adopt a heart-healthy lifestyle:  ? Don't smoke. If you need help quitting, talk to your doctor about stop-smoking programs and medicines. These can increase your chances of quitting for good. ? Limit alcohol to 2 drinks a day for men and 1 drink a day for women. ? Stay at a healthy weight. Lose weight if you need to.  ? If your doctor recommends it, get more exercise. Walking is a good choice. Bit by bit, increase the amount you walk every day. Try for at least 30 minutes on most days of the week. ? Eat heart-healthy foods. These include fruits, vegetables, high-fiber foods, and fish.  Choose foods that are low in sodium, saturated fat, and trans fat.  · Decide with your doctor whether you will also take medicines to help lower your risk. For example, you and your doctor may decide you will take a medicine that prevents blood clots. What are the symptoms of a stroke? The brain damage from a stroke starts within minutes. That's why it's so important to know the symptoms of stroke and to act fast. Quick treatment can help limit damage to the brain so that you have fewer problems after the stroke. FAST is a simple way to remember the main symptoms of stroke. Recognizing these symptoms helps you know when to call for medical help. FAST stands for:  · Face drooping. · Arm weakness. · Speech difficulty. · Time to call 911. Follow-up care is a key part of your treatment and safety. Be sure to make and go to all appointments, and call your doctor if you are having problems. It's also a good idea to know your test results and keep a list of the medicines you take. Where can you learn more? Go to http://darlin-breann.info/. Enter G757 in the search box to learn more about \"Learning About How to Prevent a Stroke. \"  Current as of: September 26, 2018  Content Version: 11.9  © 6717-1870 [employer]. Care instructions adapted under license by [employer] (which disclaims liability or warranty for this information). If you have questions about a medical condition or this instruction, always ask your healthcare professional. [place] [address] any warranty or liability for your use of this information.

## 2019-03-23 NOTE — PROGRESS NOTES
Chief Complaint   Patient presents with    Knee Pain     Bilateral      Pt presents in office today with c/o bilateral knee pain  Pt reports pain is chronic, onset 2 years ago, states pain is more constant recently   Takes otc tylenol to ease pain      1. Have you been to the ER, urgent care clinic since your last visit? Hospitalized since your last visit? No    2. Have you seen or consulted any other health care providers outside of the [address] [place] [address] since your last visit? Include any pap smears or colon screening.  No

## 2019-03-25 DIAGNOSIS — M25.562 ARTHRALGIA OF BOTH KNEES: Primary | ICD-10-CM

## 2019-03-25 DIAGNOSIS — R76.8 POSITIVE ANA (ANTINUCLEAR ANTIBODY): ICD-10-CM

## 2019-03-25 DIAGNOSIS — M25.561 ARTHRALGIA OF BOTH KNEES: Primary | ICD-10-CM

## 2019-03-25 LAB
ALBUMIN SERPL-MCNC: 4.2 G/DL (ref 3.5–5.5)
ALBUMIN/GLOB SERPL: 1 {RATIO} (ref 1.2–2.2)
ALP SERPL-CCNC: 87 IU/L (ref 39–117)
ALT SERPL-CCNC: 11 IU/L (ref 0–32)
ANA SER QL: POSITIVE
AST SERPL-CCNC: 14 IU/L (ref 0–40)
BASOPHILS # BLD AUTO: 0 X10E3/UL (ref 0–0.2)
BASOPHILS NFR BLD AUTO: 0 %
BILIRUB SERPL-MCNC: 0.4 MG/DL (ref 0–1.2)
BUN SERPL-MCNC: 15 MG/DL (ref 6–20)
BUN/CREAT SERPL: 17 (ref 9–23)
CALCIUM SERPL-MCNC: 10.3 MG/DL (ref 8.7–10.2)
CHLORIDE SERPL-SCNC: 96 MMOL/L (ref 96–106)
CO2 SERPL-SCNC: 25 MMOL/L (ref 20–29)
CREAT SERPL-MCNC: 0.88 MG/DL (ref 0.57–1)
EOSINOPHIL # BLD AUTO: 0.2 X10E3/UL (ref 0–0.4)
EOSINOPHIL NFR BLD AUTO: 3 %
ERYTHROCYTE [DISTWIDTH] IN BLOOD BY AUTOMATED COUNT: 16.4 % (ref 12.3–15.4)
ERYTHROCYTE [SEDIMENTATION RATE] IN BLOOD BY WESTERGREN METHOD: 18 MM/HR (ref 0–32)
EST. AVERAGE GLUCOSE BLD GHB EST-MCNC: 117 MG/DL
GLOBULIN SER CALC-MCNC: 4.1 G/DL (ref 1.5–4.5)
GLUCOSE SERPL-MCNC: 82 MG/DL (ref 65–99)
HBA1C MFR BLD: 5.7 % (ref 4.8–5.6)
HCT VFR BLD AUTO: 44.1 % (ref 34–46.6)
HCV GENOTYPE: NORMAL
HCV RNA SERPL NAA+PROBE-ACNC: NORMAL IU/ML
HCV RNA SERPL NAA+PROBE-LOG IU: NORMAL LOG10 IU/ML
HGB BLD-MCNC: 14 G/DL (ref 11.1–15.9)
IMM GRANULOCYTES # BLD AUTO: 0 X10E3/UL (ref 0–0.1)
IMM GRANULOCYTES NFR BLD AUTO: 0 %
IRON SATN MFR SERPL: 14 % (ref 15–55)
IRON SERPL-MCNC: 57 UG/DL (ref 27–159)
LYMPHOCYTES # BLD AUTO: 2.1 X10E3/UL (ref 0.7–3.1)
LYMPHOCYTES NFR BLD AUTO: 27 %
MCH RBC QN AUTO: 26.4 PG (ref 26.6–33)
MCHC RBC AUTO-ENTMCNC: 31.7 G/DL (ref 31.5–35.7)
MCV RBC AUTO: 83 FL (ref 79–97)
MONOCYTES # BLD AUTO: 0.6 X10E3/UL (ref 0.1–0.9)
MONOCYTES NFR BLD AUTO: 8 %
NEUTROPHILS # BLD AUTO: 4.7 X10E3/UL (ref 1.4–7)
NEUTROPHILS NFR BLD AUTO: 62 %
PLATELET # BLD AUTO: 340 X10E3/UL (ref 150–379)
POTASSIUM SERPL-SCNC: 4.3 MMOL/L (ref 3.5–5.2)
PROT SERPL-MCNC: 8.3 G/DL (ref 6–8.5)
RBC # BLD AUTO: 5.3 X10E6/UL (ref 3.77–5.28)
SODIUM SERPL-SCNC: 138 MMOL/L (ref 134–144)
TEST INFORMATION: NORMAL
TIBC SERPL-MCNC: 416 UG/DL (ref 250–450)
TSH SERPL DL<=0.005 MIU/L-ACNC: 0.61 UIU/ML (ref 0.45–4.5)
UIBC SERPL-MCNC: 359 UG/DL (ref 131–425)
WBC # BLD AUTO: 7.6 X10E3/UL (ref 3.4–10.8)

## 2019-03-27 NOTE — PROGRESS NOTES
Outbound call to patient.  verified. Patient made aware of lab results and recommendations per NP Aneesh. Patient verbalized understanding.

## 2019-04-08 ENCOUNTER — OFFICE VISIT (OUTPATIENT)
Dept: FAMILY MEDICINE CLINIC | Age: 39
End: 2019-04-08

## 2019-05-05 ENCOUNTER — HOSPITAL ENCOUNTER (EMERGENCY)
Age: 39
Discharge: HOME | DRG: 045 | End: 2019-05-05
Attending: EMERGENCY MEDICINE
Payer: MEDICAID

## 2019-05-05 ENCOUNTER — APPOINTMENT (OUTPATIENT)
Dept: CT IMAGING | Age: 39
DRG: 045 | End: 2019-05-05
Attending: EMERGENCY MEDICINE
Payer: MEDICAID

## 2019-05-05 DIAGNOSIS — G43.909 MIGRAINE WITHOUT STATUS MIGRAINOSUS, NOT INTRACTABLE, UNSPECIFIED MIGRAINE TYPE: Primary | ICD-10-CM

## 2019-05-05 DIAGNOSIS — R53.1 ACUTE LEFT-SIDED WEAKNESS: ICD-10-CM

## 2019-05-05 LAB
ALBUMIN SERPL-MCNC: 3.7 G/DL (ref 3.5–5)
ALBUMIN/GLOB SERPL: 0.6 {RATIO} (ref 1.1–2.2)
ALP SERPL-CCNC: 101 U/L (ref 45–117)
ALT SERPL-CCNC: 24 U/L (ref 12–78)
ANION GAP SERPL CALC-SCNC: 5 MMOL/L (ref 5–15)
AST SERPL-CCNC: 32 U/L (ref 15–37)
BILIRUB SERPL-MCNC: 0.4 MG/DL (ref 0.2–1)
BUN SERPL-MCNC: 10 MG/DL (ref 6–20)
BUN/CREAT SERPL: 10 (ref 12–20)
CALCIUM SERPL-MCNC: 9.8 MG/DL (ref 8.5–10.1)
CHLORIDE SERPL-SCNC: 106 MMOL/L (ref 97–108)
CO2 SERPL-SCNC: 28 MMOL/L (ref 21–32)
CREAT SERPL-MCNC: 1.01 MG/DL (ref 0.55–1.02)
GLOBULIN SER CALC-MCNC: 5.7 G/DL (ref 2–4)
GLUCOSE BLD STRIP.AUTO-MCNC: 99 MG/DL (ref 65–100)
GLUCOSE SERPL-MCNC: 83 MG/DL (ref 65–100)
POTASSIUM SERPL-SCNC: 4.8 MMOL/L (ref 3.5–5.1)
PROT SERPL-MCNC: 9.4 G/DL (ref 6.4–8.2)
SERVICE CMNT-IMP: NORMAL
SODIUM SERPL-SCNC: 139 MMOL/L (ref 136–145)

## 2019-05-05 PROCEDURE — 94761 N-INVAS EAR/PLS OXIMETRY MLT: CPT

## 2019-05-05 PROCEDURE — 74011000250 HC RX REV CODE- 250: Performed by: EMERGENCY MEDICINE

## 2019-05-05 PROCEDURE — 99285 EMERGENCY DEPT VISIT HI MDM: CPT

## 2019-05-05 PROCEDURE — 80053 COMPREHEN METABOLIC PANEL: CPT

## 2019-05-05 PROCEDURE — 96375 TX/PRO/DX INJ NEW DRUG ADDON: CPT

## 2019-05-05 PROCEDURE — 74011250636 HC RX REV CODE- 250/636: Performed by: EMERGENCY MEDICINE

## 2019-05-05 PROCEDURE — 96374 THER/PROPH/DIAG INJ IV PUSH: CPT

## 2019-05-05 PROCEDURE — 70450 CT HEAD/BRAIN W/O DYE: CPT

## 2019-05-05 PROCEDURE — 36415 COLL VENOUS BLD VENIPUNCTURE: CPT

## 2019-05-05 PROCEDURE — 82962 GLUCOSE BLOOD TEST: CPT

## 2019-05-05 RX ORDER — LABETALOL HCL 20 MG/4 ML
10 SYRINGE (ML) INTRAVENOUS
Status: DISCONTINUED | OUTPATIENT
Start: 2019-05-05 | End: 2019-05-05 | Stop reason: HOSPADM

## 2019-05-05 RX ORDER — SODIUM CHLORIDE 9 MG/ML
50 INJECTION, SOLUTION INTRAVENOUS ONCE
Status: DISCONTINUED | OUTPATIENT
Start: 2019-05-05 | End: 2019-05-05 | Stop reason: HOSPADM

## 2019-05-05 RX ORDER — SODIUM CHLORIDE 0.9 % (FLUSH) 0.9 %
5-40 SYRINGE (ML) INJECTION AS NEEDED
Status: DISCONTINUED | OUTPATIENT
Start: 2019-05-05 | End: 2019-05-05 | Stop reason: HOSPADM

## 2019-05-05 RX ORDER — SODIUM CHLORIDE 0.9 % (FLUSH) 0.9 %
5-40 SYRINGE (ML) INJECTION EVERY 8 HOURS
Status: DISCONTINUED | OUTPATIENT
Start: 2019-05-05 | End: 2019-05-05 | Stop reason: HOSPADM

## 2019-05-05 RX ORDER — DIPHENHYDRAMINE HYDROCHLORIDE 50 MG/ML
25 INJECTION, SOLUTION INTRAMUSCULAR; INTRAVENOUS
Status: COMPLETED | OUTPATIENT
Start: 2019-05-05 | End: 2019-05-05

## 2019-05-05 RX ADMIN — PROCHLORPERAZINE EDISYLATE 10 MG: 5 INJECTION INTRAMUSCULAR; INTRAVENOUS at 17:41

## 2019-05-05 RX ADMIN — METHYLPREDNISOLONE SODIUM SUCCINATE 125 MG: 40 INJECTION, POWDER, FOR SOLUTION INTRAMUSCULAR; INTRAVENOUS at 17:39

## 2019-05-05 RX ADMIN — DIPHENHYDRAMINE HYDROCHLORIDE 25 MG: 50 INJECTION, SOLUTION INTRAMUSCULAR; INTRAVENOUS at 17:41

## 2019-05-05 NOTE — ED PROVIDER NOTES
41 y.o. female with past medical history significant for HTN, thalassemia, h/o IV drug use, methadone maintenance therapy, PTSD, major depression, s/p ARIAAN and BSO, who presents to the ED with assistance of a wheelchair accompanied by significant other, with chief complaint of left sided facial droop and headache. Significant other reports that he was having a \"stressful\" conversation in the car with the patient about the patient's brother ~15 minutes PTA, when she started \"talking funny\" and her face \"locked up, and slanted sideways to the left\". He notes that he \"could tell patient's blood pressure was rising with stress\" during conversation. Pt also exhibited left sided weakness, and was diaphoretic upon arrival. Significant other initially denies that the patient has exhibited these symptoms before, but patient notes that she has experienced weakness in the past with h/o migraines. Pt complains of headache here in the ED. Significant other denies that the patient has experienced any falls or trauma. He also denies that patient is on any anticoagulative therapy, and denies that patient is followed by a neurologist. There are no other acute medical concerns at this time. Social hx: Positive for Tobacco use (current every day smoker, 0.5 packs/day); Negative for EtOH use; Illicit Drug use (h/o IV drug use) PCP: Herber Hall MD 
 
Note written by Barrie Harrison, as dictated by Tuan Garcia, DO 4:19 PM 
 
The history is provided by the patient, medical records and a significant other. No  was used. Past Medical History:  
Diagnosis Date  ex- IVDU (intravenous drug user)  H/O hysterectomy for benign disease  Hypertension  Major depression, recurrent ([facility] [address]  Methadone maintenance therapy patient ([facility] [address]  Other ill-defined conditions(799.89) ovarian cyst  
 PTSD (post-traumatic stress disorder)  Thalassemia Past Surgical History: Procedure Laterality Date  HX GYN    
 HX HEENT    
 t&a  HX ARIANA AND BSO  2009 Endometriosis, Uterine Septum Family History:  
Problem Relation Age of Onset  Hypertension Mother  Hypertension Sister  Asthma Sister  Heart Disease Sister  Diabetes Maternal Aunt  Diabetes Maternal Uncle  Other Son Lactose Intolerance  Asthma Daughter  Other Daughter Alpha Thalessmia Social History Socioeconomic History  Marital status: SINGLE Spouse name: Not on file  Number of children: Not on file  Years of education: Not on file  Highest education level: Not on file Occupational History  Not on file Social Needs  Financial resource strain: Not on file  Food insecurity:  
  Worry: Not on file Inability: Not on file  Transportation needs:  
  Medical: Not on file Non-medical: Not on file Tobacco Use  Smoking status: Current Every Day Smoker Packs/day: 0.50  Smokeless tobacco: Current User Substance and Sexual Activity  Alcohol use: No  
 Drug use: No  
 Sexual activity: Not Currently Birth control/protection: None Lifestyle  Physical activity:  
  Days per week: Not on file Minutes per session: Not on file  Stress: Not on file Relationships  Social connections:  
  Talks on phone: Not on file Gets together: Not on file Attends [employer] service: Not on file Active member of club or organization: Not on file Attends meetings of clubs or organizations: Not on file Relationship status: Not on file  Intimate partner violence:  
  Fear of current or ex partner: Not on file Emotionally abused: Not on file Physically abused: Not on file Forced sexual activity: Not on file Other Topics Concern  Not on file Social History Narrative  Not on file ALLERGIES: Amlodipine; Hydrochlorothiazide; and Nsaids (non-steroidal anti-inflammatory drug) Review of Systems Constitutional: Positive for diaphoresis. Negative for activity change, appetite change, chills and fever. HENT: Negative for congestion, rhinorrhea, sinus pressure, sneezing and sore throat. Eyes: Negative for photophobia and visual disturbance. Respiratory: Negative for cough and shortness of breath. Cardiovascular: Negative for chest pain. Gastrointestinal: Negative for abdominal pain, blood in stool, constipation, diarrhea, nausea and vomiting. Genitourinary: Negative for difficulty urinating, dysuria, flank pain, frequency, hematuria, menstrual problem, urgency, vaginal bleeding and vaginal discharge. Musculoskeletal: Negative for arthralgias, back pain, myalgias and neck pain. Skin: Negative for rash and wound. Neurological: Positive for facial asymmetry (left sided), speech difficulty, weakness (left side, LUE, LLE) and headaches. Negative for syncope and numbness. Psychiatric/Behavioral: Negative for self-injury and suicidal ideas. All other systems reviewed and are negative. Vitals:  
 05/05/19 1630 05/05/19 1645 BP: (!) 143/98 (!) 177/99 Pulse: 67 (!) 51 Resp: 18 12 Temp: 97.9 °F (36.6 °C) SpO2: 97% 98% Weight: 56.2 kg (123 lb 14.4 oz) Physical Exam  
Constitutional: She appears well-developed and well-nourished. She appears ill. No distress. Mildly lethargic. HENT:  
Head: Normocephalic and atraumatic. Eyes: Pupils are equal, round, and reactive to light. Conjunctivae and EOM are normal.  
Neck: Neck supple. Cardiovascular: Normal rate, regular rhythm and normal heart sounds. Pulmonary/Chest: Effort normal and breath sounds normal.  
Abdominal: Soft. She exhibits no distension. There is no tenderness. Musculoskeletal: She exhibits no edema or tenderness. Neurological:  
Slurred speech. Left sided facial droop. Intact sensation in face. Significant 3/5 weakness in LUE and LLE. Clumsy left sided coordination. Positive left sided pronator drift. 5/5 strength in RUE and RLE with intact sensation and coordination. Skin: Skin is warm and dry. She is not diaphoretic. Nursing note and vitals reviewed. Note written by Barrie Romo, as dictated by Thompson Varma DO 4:19 PM 
 
MDM Procedures PROGRESS NOTE: 
4:17 PM 
Code S called at this time. CONSULT NOTE: 
4:29 PM Thompson Varma DO spoke with Dr. Rocio Rasmussen MD, Consult for TeleNeurology. Discussed available diagnostic tests and clinical findings. Dr. Rocio Rasmussen will evaluate the patient. CONSULT NOTE: 
4:56 PM Thompson Varma DO spoke with Dr. Rocio Rasmussen MD, Consult for Neurology. Discussed available diagnostic tests and clinical findings. Dr. Rocio Rasmussen spoke with significant other and patient at bedside. Pt states that she has h/o prior hemiplegic migraines like today's symptoms and does not take any preventative or treatment medication for migraine headache or follow with neurologist. They declined TPA at this point. Dr. Rocio Rasmussen recommends giving the patient a migraine cocktail and admit for MRI, and neurology consultation to further disccuss migraine symptoms. CT head WO was negative for any acute abnormalities. Labs show no significant abnormalities. PROGRESS NOTE: 
5:20 PM 
Patient and significant other have changed their minds and have decided to go forward with TPA. PROGRESS NOTE: 
5:25 PM 
Patient reevaulated at this time. She now shows improvement in exam, moving her left side significantly more than previously. Pt became emotionally labile and verbally abusive, speaking with clear speech no further slurred words,  towards significant other stating: \"Why are we here, I hate hospitals\" and \"I want to go home and go to bed\". Teleneurologist will be contacted again.   
 
PROGRESS NOTE: 
6:02 PM 
 Final decision was made by teleneurologist to not give TPA. She was given IV migraine cocktail. She again was seen to have improvement in her left sided weakness and her level of alertness. No further facial droop or slurred speech, slight left sided arm weakness on repeat examination, but improved. Feel that sx are likely associated with complicated migraine, low suspicion for TIA/CVA, although still definitely recommended admission for MRI and neurology evaluation for further headache mgmt, given the severity of her sx. PROGRESS NOTE: 
6:12 PM 
Patient deciding to leave the ED AMA. Long discussion was had with the patient, her , and other family members at the bedside regarding the risks of leaving, and the significant benefits that she may have if she cooperated with evaluation and admission. Her  states that he fully understand and agrees that she should stay to be evaluated, but the patient firmly stated that she refused to stay and acknowledged the risks, so he will take her home. Patient expresses wish to leave the emergency department against medical advice. Adverse problems related to this decision including bodily harm, permanent disability and death have been discussed with the patient and/or family. The patient and/or family do not appear intoxicated and appear to be capable of understanding and making a decision of such gravity. The patient and/or family express understanding of the possible adverse outcomes of their decision and still express the wish to leave against medical advice. The patient and/or family were given follow up and return instructions and the available laboratory tests and imaging studies were discussed. The patient and/or family were given the opportunity to ask questions. The patient and/or family agree to follow up with a physician of their choice as soon as possible or to return to the ER at any time to finish the work-up.

## 2019-05-05 NOTE — DISCHARGE INSTRUCTIONS
Patient Education        Recurring Migraine Headache: Care Instructions  Your Care Instructions  Migraines are painful, throbbing headaches. They often start on one side of the head. They may cause nausea and vomiting and make you sensitive to light, sound, or smell. Some people may have only a few migraines throughout life. Others have them as often as several times a month. The goal of treatment is to reduce the number of migraines you have and relieve your symptoms. Even with treatment, you may continue to have migraines. You play an important role in dealing with your headaches. Work on avoiding things that seem to trigger your migraines. When you feel a headache coming on, act quickly to stop it before it gets worse. Follow-up care is a key part of your treatment and safety. Be sure to make and go to all appointments, and call your doctor if you are having problems. It's also a good idea to know your test results and keep a list of the medicines you take. How can you care for yourself at home? · Do not drive if you have taken a prescription pain medicine. · Rest in a quiet, dark room until your headache is gone. Close your eyes and try to relax or go to sleep. Do not watch TV or read. · Put a cold, moist cloth or cold pack on the painful area for 10 to 20 minutes at a time. Put a thin cloth between the cold pack and your skin. · Have someone gently massage your neck and shoulders. · Take your medicines exactly as prescribed. Call your doctor if you think you are having a problem with your medicine. You will get more details on the specific medicines your doctor prescribes. To prevent migraines  · Keep a headache diary so you can figure out what triggers your headaches. Avoiding triggers may help you prevent headaches. Record when each headache began, how long it lasted, and what the pain was like. Use words like throbbing, aching, stabbing, or dull.  Write down any other symptoms you had with the headache. These may include nausea, flashing lights or dark spots, or sensitivity to bright light or loud noise. Note if the headache occurred near your period. List anything that might have triggered the headache. Triggers may include certain foods (chocolate, cheese, wine) or odors, smoke, bright light, stress, or lack of sleep. · If your doctor has prescribed medicine for your migraines, take it as directed. You may have medicine that you take only when you get a migraine and medicine that you take all the time to help prevent migraines. ? If your doctor has prescribed medicine for when you get a headache, take it at the first sign of a migraine, unless your doctor has given you other instructions. ? If your doctor has prescribed medicine to prevent migraines, take it exactly as prescribed. Call your doctor if you think you are having a problem with your medicine. · Find healthy ways to deal with stress. Migraines are most common during or right after stressful times. Take time to relax before and after you do something that has caused a migraine in the past.  · Try to keep your muscles relaxed by keeping good posture. Check your jaw, face, neck, and shoulder muscles for tension. Try to relax them. When sitting at a desk, change positions often. Stretch for 30 seconds each hour. · Get regular sleep and exercise. · Eat regular meals, and avoid foods and drinks that often trigger migraines. These include chocolate and alcohol, especially red wine and port. Chemicals used in food, such as aspartame and monosodium glutamate (MSG), also can trigger migraines. So can some food additives, such as those found in hot dogs, uriarte, cold cuts, aged cheeses, and pickled foods. · Limit caffeine by not drinking too much coffee, tea, or soda. Do not quit caffeine suddenly, because that can also give you migraines. · Do not smoke or allow others to smoke around you.  If you need help quitting, talk to your doctor about stop-smoking programs and medicines. These can increase your chances of quitting for good. · If you are taking birth control pills or hormone therapy, talk to your doctor about whether they are triggering your migraines. When should you call for help? Call 911 anytime you think you may need emergency care. For example, call if:    · You have symptoms of a stroke. These may include:  ? Sudden numbness, tingling, weakness, or loss of movement in your face, arm, or leg, especially on only one side of your body. ? Sudden vision changes. ? Sudden trouble speaking. ? Sudden confusion or trouble understanding simple statements. ? Sudden problems with walking or balance. ? A sudden, severe headache that is different from past headaches.    Call your doctor now or seek immediate medical care if:    · You develop a fever and a stiff neck.     · You have new nausea and vomiting, or you cannot keep down food or liquids.    Watch closely for changes in your health, and be sure to contact your doctor if:    · You have a headache that does not get better within 1 or 2 days.     · Your headaches get worse or happen more often. Where can you learn more? Go to http://darlin-breann.info/. Enter V975 in the search box to learn more about \"Recurring Migraine Headache: Care Instructions. \"  Current as of: Diane 3, 2018  Content Version: 11.9  © 3971-4259 [employer]. Care instructions adapted under license by [employer] (which disclaims liability or warranty for this information). If you have questions about a medical condition or this instruction, always ask your healthcare professional. [place] [address] any warranty or liability for your use of this information.

## 2019-05-05 NOTE — ED TRIAGE NOTES
Patient presents from home with complaints of L sided arm and leg weakness, dysarthria, L sided facial droop, and headache. Family reports that patient has a history of complex migraines and has had symptoms similar to this. Family also reports that she has a history of high blood pressure

## 2019-05-05 NOTE — ED NOTES
1620: Patient headed to CT from triage MD and RN at CT to evaluate 1622: Neuro NP in CT to evaluate PT 
 
1629: Patient headed to stroke bay 1631: Dr. Len Chino on screen to evaluate patient. Dr. Len Chino discussing risk/benfit of TPA with patient and significant other. Patient and significant other refusing TPA at this time. Dr. Len Chino states she supports their decision. 1645: Patient transported to room 14 at this time. 1700: Dr. Len Chino called RN and is requesting to see patient on screen again at this time. 1703: RN in room with Dr. Len Chino on screen. Dr. Len Chino discussing TPA administration with patient and significant other again. 1710: Patient stating she wants TPA at this time. Dr. Kylie Peña aware and Pharmacy has been notified [phone]: 2 RNs and tech attempting to get IV access at this time, pharmacy setting up TPA 
 
1725: Patient's neuro status improved, Patient refusing TPA at this time. Patient is moving around and fidgeting on stretcher. Patient will not sit still. Dr. Kylie Peña in room to evaluate patient at this time. Patient stating \"I want to leave\", \"I hate hospitals\", \"did I miss any calls? \" 
 
4656: Tele-neuro paged [id number]: Tele-neuro on screen at this time 1804:  Patient requesting to leave at this time and requesting to sign papers to leave, Dr. Kylie Peña made aware. 1810: Patient has signed AMA papers and verbalized understanding. lines have been removed a this time

## 2019-05-06 ENCOUNTER — HOSPITAL ENCOUNTER (INPATIENT)
Age: 39
LOS: 2 days | Discharge: HOME HEALTH | DRG: 045 | End: 2019-05-09
Attending: EMERGENCY MEDICINE | Admitting: INTERNAL MEDICINE
Payer: MEDICAID

## 2019-05-06 ENCOUNTER — APPOINTMENT (OUTPATIENT)
Dept: GENERAL RADIOLOGY | Age: 39
DRG: 045 | End: 2019-05-06
Attending: FAMILY MEDICINE
Payer: MEDICAID

## 2019-05-06 VITALS
WEIGHT: 123.9 LBS | OXYGEN SATURATION: 98 % | TEMPERATURE: 97.9 F | SYSTOLIC BLOOD PRESSURE: 133 MMHG | BODY MASS INDEX: 21.27 KG/M2 | DIASTOLIC BLOOD PRESSURE: 86 MMHG | HEART RATE: 53 BPM | RESPIRATION RATE: 16 BRPM

## 2019-05-06 DIAGNOSIS — I63.10 CEREBROVASCULAR ACCIDENT (CVA) DUE TO EMBOLISM OF PRECEREBRAL ARTERY (HCC): ICD-10-CM

## 2019-05-06 DIAGNOSIS — R53.1 LEFT-SIDED WEAKNESS: Primary | ICD-10-CM

## 2019-05-06 PROBLEM — G45.9 TIA (TRANSIENT ISCHEMIC ATTACK): Status: ACTIVE | Noted: 2019-05-06

## 2019-05-06 LAB
APPEARANCE UR: ABNORMAL
BACTERIA URNS QL MICRO: NEGATIVE /HPF
BASOPHILS # BLD: 0 K/UL (ref 0–0.1)
BASOPHILS NFR BLD: 0 % (ref 0–1)
BILIRUB UR QL: NEGATIVE
CK SERPL-CCNC: 52 U/L (ref 26–192)
COLOR UR: ABNORMAL
DIFFERENTIAL METHOD BLD: ABNORMAL
EOSINOPHIL # BLD: 0 K/UL (ref 0–0.4)
EOSINOPHIL NFR BLD: 0 % (ref 0–7)
EPITH CASTS URNS QL MICRO: ABNORMAL /LPF
ERYTHROCYTE [DISTWIDTH] IN BLOOD BY AUTOMATED COUNT: 15.5 % (ref 11.5–14.5)
GLUCOSE BLD STRIP.AUTO-MCNC: 112 MG/DL (ref 65–100)
GLUCOSE UR STRIP.AUTO-MCNC: NEGATIVE MG/DL
HCT VFR BLD AUTO: 43.9 % (ref 35–47)
HGB BLD-MCNC: 14 G/DL (ref 11.5–16)
HGB UR QL STRIP: ABNORMAL
HYALINE CASTS URNS QL MICRO: ABNORMAL /LPF (ref 0–5)
IMM GRANULOCYTES # BLD AUTO: 0 K/UL (ref 0–0.04)
IMM GRANULOCYTES NFR BLD AUTO: 0 % (ref 0–0.5)
KETONES UR QL STRIP.AUTO: ABNORMAL MG/DL
LEUKOCYTE ESTERASE UR QL STRIP.AUTO: ABNORMAL
LYMPHOCYTES # BLD: 2.2 K/UL (ref 0.8–3.5)
LYMPHOCYTES NFR BLD: 19 % (ref 12–49)
MCH RBC QN AUTO: 27 PG (ref 26–34)
MCHC RBC AUTO-ENTMCNC: 31.9 G/DL (ref 30–36.5)
MCV RBC AUTO: 84.7 FL (ref 80–99)
MONOCYTES # BLD: 0.7 K/UL (ref 0–1)
MONOCYTES NFR BLD: 6 % (ref 5–13)
NEUTS SEG # BLD: 8.4 K/UL (ref 1.8–8)
NEUTS SEG NFR BLD: 75 % (ref 32–75)
NITRITE UR QL STRIP.AUTO: NEGATIVE
NRBC # BLD: 0 K/UL (ref 0–0.01)
NRBC BLD-RTO: 0 PER 100 WBC
PH UR STRIP: 6 [PH] (ref 5–8)
PLATELET # BLD AUTO: 264 K/UL (ref 150–400)
PMV BLD AUTO: 11.3 FL (ref 8.9–12.9)
PROT UR STRIP-MCNC: NEGATIVE MG/DL
RBC # BLD AUTO: 5.18 M/UL (ref 3.8–5.2)
RBC #/AREA URNS HPF: ABNORMAL /HPF (ref 0–5)
SERVICE CMNT-IMP: ABNORMAL
SP GR UR REFRACTOMETRY: 1.03 (ref 1–1.03)
TROPONIN I SERPL-MCNC: <0.05 NG/ML
UR CULT HOLD, URHOLD: NORMAL
UROBILINOGEN UR QL STRIP.AUTO: 1 EU/DL (ref 0.2–1)
WBC # BLD AUTO: 11.2 K/UL (ref 3.6–11)
WBC URNS QL MICRO: ABNORMAL /HPF (ref 0–4)

## 2019-05-06 PROCEDURE — 85025 COMPLETE CBC W/AUTO DIFF WBC: CPT

## 2019-05-06 PROCEDURE — 74011250637 HC RX REV CODE- 250/637: Performed by: FAMILY MEDICINE

## 2019-05-06 PROCEDURE — 81001 URINALYSIS AUTO W/SCOPE: CPT

## 2019-05-06 PROCEDURE — 99284 EMERGENCY DEPT VISIT MOD MDM: CPT

## 2019-05-06 PROCEDURE — 71046 X-RAY EXAM CHEST 2 VIEWS: CPT

## 2019-05-06 PROCEDURE — 99218 HC RM OBSERVATION: CPT

## 2019-05-06 PROCEDURE — 36415 COLL VENOUS BLD VENIPUNCTURE: CPT

## 2019-05-06 PROCEDURE — 82962 GLUCOSE BLOOD TEST: CPT

## 2019-05-06 PROCEDURE — 74011250636 HC RX REV CODE- 250/636: Performed by: FAMILY MEDICINE

## 2019-05-06 PROCEDURE — 82550 ASSAY OF CK (CPK): CPT

## 2019-05-06 PROCEDURE — 84484 ASSAY OF TROPONIN QUANT: CPT

## 2019-05-06 RX ORDER — HYDRALAZINE HYDROCHLORIDE 20 MG/ML
10 INJECTION INTRAMUSCULAR; INTRAVENOUS
Status: DISCONTINUED | OUTPATIENT
Start: 2019-05-06 | End: 2019-05-06

## 2019-05-06 RX ORDER — METHADONE HYDROCHLORIDE 5 MG/5ML
120 SOLUTION ORAL DAILY
Status: DISCONTINUED | OUTPATIENT
Start: 2019-05-07 | End: 2019-05-09 | Stop reason: HOSPADM

## 2019-05-06 RX ORDER — SODIUM CHLORIDE 0.9 % (FLUSH) 0.9 %
5-40 SYRINGE (ML) INJECTION EVERY 8 HOURS
Status: DISCONTINUED | OUTPATIENT
Start: 2019-05-06 | End: 2019-05-09 | Stop reason: HOSPADM

## 2019-05-06 RX ORDER — SODIUM CHLORIDE 0.9 % (FLUSH) 0.9 %
5-40 SYRINGE (ML) INJECTION AS NEEDED
Status: DISCONTINUED | OUTPATIENT
Start: 2019-05-06 | End: 2019-05-09 | Stop reason: HOSPADM

## 2019-05-06 RX ORDER — PRAVASTATIN SODIUM 40 MG/1
40 TABLET ORAL
Status: DISCONTINUED | OUTPATIENT
Start: 2019-05-06 | End: 2019-05-09 | Stop reason: HOSPADM

## 2019-05-06 RX ORDER — HEPARIN SODIUM 5000 [USP'U]/ML
5000 INJECTION, SOLUTION INTRAVENOUS; SUBCUTANEOUS EVERY 8 HOURS
Status: DISCONTINUED | OUTPATIENT
Start: 2019-05-06 | End: 2019-05-09 | Stop reason: HOSPADM

## 2019-05-06 RX ORDER — ACETAMINOPHEN 650 MG/1
650 SUPPOSITORY RECTAL
Status: DISCONTINUED | OUTPATIENT
Start: 2019-05-06 | End: 2019-05-09 | Stop reason: HOSPADM

## 2019-05-06 RX ORDER — BUTALBITAL, ACETAMINOPHEN AND CAFFEINE 50; 325; 40 MG/1; MG/1; MG/1
1 TABLET ORAL
Status: DISCONTINUED | OUTPATIENT
Start: 2019-05-06 | End: 2019-05-09 | Stop reason: HOSPADM

## 2019-05-06 RX ORDER — IBUPROFEN 200 MG
1 TABLET ORAL EVERY 24 HOURS
Status: DISCONTINUED | OUTPATIENT
Start: 2019-05-06 | End: 2019-05-09 | Stop reason: HOSPADM

## 2019-05-06 RX ORDER — FAMOTIDINE 20 MG/1
20 TABLET, FILM COATED ORAL EVERY 12 HOURS
Status: DISCONTINUED | OUTPATIENT
Start: 2019-05-06 | End: 2019-05-09 | Stop reason: HOSPADM

## 2019-05-06 RX ORDER — GUAIFENESIN 100 MG/5ML
81 LIQUID (ML) ORAL DAILY
Status: DISCONTINUED | OUTPATIENT
Start: 2019-05-07 | End: 2019-05-09 | Stop reason: HOSPADM

## 2019-05-06 RX ORDER — LOSARTAN POTASSIUM 50 MG/1
50 TABLET ORAL DAILY
Status: DISCONTINUED | OUTPATIENT
Start: 2019-05-07 | End: 2019-05-07

## 2019-05-06 RX ORDER — SODIUM CHLORIDE 9 MG/ML
75 INJECTION, SOLUTION INTRAVENOUS CONTINUOUS
Status: DISCONTINUED | OUTPATIENT
Start: 2019-05-06 | End: 2019-05-07

## 2019-05-06 RX ORDER — ALPRAZOLAM 0.25 MG/1
0.25 TABLET ORAL ONCE
Status: DISCONTINUED | OUTPATIENT
Start: 2019-05-06 | End: 2019-05-06

## 2019-05-06 RX ORDER — HYDRALAZINE HYDROCHLORIDE 20 MG/ML
10 INJECTION INTRAMUSCULAR; INTRAVENOUS
Status: DISCONTINUED | OUTPATIENT
Start: 2019-05-06 | End: 2019-05-09 | Stop reason: HOSPADM

## 2019-05-06 RX ORDER — ALPRAZOLAM 0.25 MG/1
0.25 TABLET ORAL
Status: COMPLETED | OUTPATIENT
Start: 2019-05-06 | End: 2019-05-07

## 2019-05-06 RX ORDER — ACETAMINOPHEN 325 MG/1
650 TABLET ORAL
Status: DISCONTINUED | OUTPATIENT
Start: 2019-05-06 | End: 2019-05-09 | Stop reason: HOSPADM

## 2019-05-06 RX ADMIN — HEPARIN SODIUM 5000 UNITS: 5000 INJECTION INTRAVENOUS; SUBCUTANEOUS at 21:41

## 2019-05-06 RX ADMIN — BUTALBITAL, ACETAMINOPHEN AND CAFFEINE 1 TABLET: 50; 325; 40 TABLET ORAL at 16:59

## 2019-05-06 RX ADMIN — SODIUM CHLORIDE 75 ML/HR: 900 INJECTION, SOLUTION INTRAVENOUS at 21:42

## 2019-05-06 RX ADMIN — PRAVASTATIN SODIUM 40 MG: 40 TABLET ORAL at 21:41

## 2019-05-06 RX ADMIN — FAMOTIDINE 20 MG: 20 TABLET ORAL at 21:41

## 2019-05-06 RX ADMIN — Medication 10 ML: at 21:41

## 2019-05-06 NOTE — ED PROVIDER NOTES
Pt. Presents to the ER with complaints of mild headache and left sided weakness. Pt. Was seen yesterday as a stroke alert. Pt. Was seen by teleneuro who thought that her sx were likely 2/2 migraine vs. CVA. Pt. Left the ER yesterday AMA. Pt. States that she had to leave because she had minors at her hours. Pt. Returned today for continued care. Pt. Still reports a headache, although, she says that it feels much better today. Pt. Continues to have left arm and left leg weakness. Pt. Says that this has improved some. No fevers/chills. NO n/v/d. No other complaints. Past Medical History:  
Diagnosis Date  ex- IVDU (intravenous drug user)  H/O hysterectomy for benign disease  Hypertension  Major depression, recurrent ([facility] [address]  Methadone maintenance therapy patient ([facility] [address]  Other ill-defined conditions(799.89) ovarian cyst  
 PTSD (post-traumatic stress disorder)  Thalassemia Past Surgical History:  
Procedure Laterality Date  HX GYN    
 HX HEENT    
 t&a  HX ARIANA AND BSO  2009 Endometriosis, Uterine Septum Family History:  
Problem Relation Age of Onset  Hypertension Mother  Hypertension Sister  Asthma Sister  Heart Disease Sister  Diabetes Maternal Aunt  Diabetes Maternal Uncle  Other Son Lactose Intolerance  Asthma Daughter  Other Daughter Alpha Thalessmia Social History Socioeconomic History  Marital status: SINGLE Spouse name: Not on file  Number of children: Not on file  Years of education: Not on file  Highest education level: Not on file Occupational History  Not on file Social Needs  Financial resource strain: Not on file  Food insecurity:  
  Worry: Not on file Inability: Not on file  Transportation needs:  
  Medical: Not on file Non-medical: Not on file Tobacco Use  Smoking status: Current Every Day Smoker Packs/day: 0.50  Smokeless tobacco: Current User Substance and Sexual Activity  Alcohol use: No  
 Drug use: No  
 Sexual activity: Not Currently Birth control/protection: None Lifestyle  Physical activity:  
  Days per week: Not on file Minutes per session: Not on file  Stress: Not on file Relationships  Social connections:  
  Talks on phone: Not on file Gets together: Not on file Attends [employer] service: Not on file Active member of club or organization: Not on file Attends meetings of clubs or organizations: Not on file Relationship status: Not on file  Intimate partner violence:  
  Fear of current or ex partner: Not on file Emotionally abused: Not on file Physically abused: Not on file Forced sexual activity: Not on file Other Topics Concern  Not on file Social History Narrative  Not on file ALLERGIES: Amlodipine; Hydrochlorothiazide; and Nsaids (non-steroidal anti-inflammatory drug) Review of Systems Constitutional: Negative for chills and fever. HENT: Negative for rhinorrhea and sore throat. Respiratory: Negative for cough and shortness of breath. Cardiovascular: Negative for chest pain. Gastrointestinal: Negative for abdominal pain, diarrhea, nausea and vomiting. Genitourinary: Negative for dysuria and urgency. Musculoskeletal: Negative for arthralgias and back pain. Skin: Negative for rash. Neurological: Positive for weakness and headaches. Negative for dizziness and light-headedness. Vitals:  
 05/06/19 1237 Pulse: 77 SpO2: 98% Physical Exam  
 
Vital signs reviewed. Nursing notes reviewed. Const:  No acute distress, well developed, well nourished Head:  Atraumatic, normocephalic Eyes:  PERRL, conjunctiva normal, no scleral icterus Neck:  Supple, trachea midline Cardiovascular:  RRR, no murmurs, no gallops, no rubs Resp:  No resp distress, no increased work of breathing, no wheezes, no rhonchi, no rales, Abd:  Soft, non-tender, non-distended, no rebound, no guarding, no CVA tenderness MSK:  No pedal edema, normal ROM Neuro:  Alert and oriented x3, no cranial nerve defect, 4+/5 LUE and LLE strength, steady gait Skin:  Warm, dry, intact Psych: normal mood and affect, behavior is normal, judgement and thought content is normal 
 
 
 
 
MDM Number of Diagnoses or Management Options Left-sided weakness:  
  
Amount and/or Complexity of Data Reviewed Clinical lab tests: reviewed and ordered Tests in the radiology section of CPT®: reviewed Pt. Presents to the ER with complaints of left sided weakness. Pt. Had left AMA yesterday instead of being admitted for CVA work-up. Pt. Continues to have left sided weakness. Pt. To be evaluated for admission by the hospitalist.   
 
 
Procedures

## 2019-05-06 NOTE — PROGRESS NOTES
Pt. Says she is claustrophobic. Nurse spoke to Dr. Chyna Oro who ordered Xanax 0.25mg PO one time dose prior to MRI

## 2019-05-06 NOTE — ED TRIAGE NOTES
TRIAGE: Pt arrives with c/o headache and \"confusion. \" Pt was here yesterday as a Code S level one and refused the TPA and checked out AMA because \"she said she had a bunch of minors at her house. \" +weakness in left arm and leg. Denies fever, chills, N/V.

## 2019-05-06 NOTE — PROGRESS NOTES
Admission Medication Reconciliation: 
Information obtained from:  patient interview/RxQuery Comments/Recommendations: Updated PTA meds/reviewed patient's allergies. Medication changes (since last review): Added 
- none Adjusted 
- none Removed 
- none Allergies:  Amlodipine; Hydrochlorothiazide; and Nsaids (non-steroidal anti-inflammatory drug) Significant PMH/Disease States:  
Past Medical History:  
Diagnosis Date  
 ex- IVDU (intravenous drug user) H/O hysterectomy for benign disease Hypertension Major depression, recurrent ([address] Methadone maintenance therapy patient ([address] Other ill-defined conditions([address]99[address]89) ovarian cyst  
 PTSD (post-traumatic stress disorder) Thalassemia Chief Complaint for this Admission: Chief Complaint Patient presents with Headache Prior to Admission Medications:  
Prior to Admission Medications Prescriptions Last Dose Informant Patient Reported? Taking?  
losartan (COZAAR) 50 mg tablet 5/5/2019 at am  No Yes Sig: Take 1 Tab by mouth daily. methadone (DOLOPHINE) 10 mg tablet 5/6/2019 at am  No Yes Sig: Take 8 Tabs by mouth daily. Max Daily Amount: 80 mg.  
metoprolol succinate (TOPROL-XL) 50 mg XL tablet 5/5/2019 at pm  No Yes Sig: Take 1 Tab by mouth nightly. Facility-Administered Medications: None Thank you for allowing me to participate in the care of this patient. If there are any further questions, please contact the pharmacy at  or the medication reconciliation pharmacist at . Trevin Bell, Pharm. D., [facility]

## 2019-05-06 NOTE — ED NOTES
Verbal shift change report given to [facility] (oncoming nurse) by Josh Grande RN (offgoing nurse). Report included the following information SBAR, ED Summary and Recent Results. Patient resting in position of comfort in assigned [facility] room. 4:17 PM 
Patient ambulatory to restroom to provide urine specimen, steady gait noted.

## 2019-05-06 NOTE — ROUTINE PROCESS
Verbal shift change report given to Peggy Long (oncoming nurse) by Rigoberto Cruz (offgoing nurse). Report included the following information SBAR, ED Summary and Recent Results.

## 2019-05-06 NOTE — H&P
[address] HISTORY AND PHYSICAL Name:  Олег Lund 
MR#:  [id number] :  1980 ACCOUNT #:  [de-identified] ADMIT DATE:  2019 CHIEF COMPLAINT:  Headache. HISTORY OF PRESENT ILLNESS:  The patient is a 50-year-old female with past medical history of opioid dependence, history of migraines, who presents to the hospital with the above-mentioned symptom. The patient reports that yesterday she started having significant headache associated with some left arm and left lower extremity weakness. The patient reports that she came to the ER as a stroke alert. Per chart, the patient was seen by Teleneurology who thought the patient's symptoms were secondary to migraine versus CVA and was requested to be admitted for rule out. The patient had some social issues and had some minors at home, and thus was not able to stay in the hospital.  The patient reports the headache persisted. She continued to feel some left-sided weakness. Although it got much better, she got concerned and decided to come to the hospital.  The patient reports that she has never had these symptoms before with the migraine. She reports that her weakness has improved somewhat, but it still persists. The patient does report that she has blurry vision, especially \"while playing my game on the video or on my phone. \"  Denies any trouble swallowing or trouble with speech, any chest pain, shortness of breath, cough, fever, chills, abdominal pain, constipation, diarrhea, urinary symptoms, focal or generalized neurological weakness, recent travel, sick contacts, any falls, injuries, hematemesis, melena, hemoptysis, hematuria, or any other concerns or problems. PAST MEDICAL HISTORY:  See above. HOME MEDICATIONS:  Currently, the patient is on; 1. Metoprolol 50 mg nightly. 2.  Losartan 50 mg daily. 3.  Methadone 80 mg daily. SOCIAL HISTORY:  Current everyday smoker, smokes half a pack per day.   No alcohol. No IV drug abuse. Lives at home. FAMILY HISTORY:  Mother has history of hypertension. Sister has history of hypertension. Sister has history of asthma. Sister has history of heart disease. Maternal aunt has history of diabetes. Maternal uncle has history of diabetes. ALLERGIES:  AMLODIPINE, HYDROCHLOROTHIAZIDE, NSAIDS. REVIEW OF SYMPTOMS:  All systems were reviewed and were found to be essentially negative, except for the symptoms mentioned above. PHYSICAL EXAMINATION: 
VITAL SIGNS:  Temperature 98, pulse 56, respiratory rate 18, blood pressure 168/113, pulse oximetry 95% on room air. GENERAL:  Alert x3, awake, no acute distress, resting in bed, pleasant female, appears to be stated age. HEENT:  Pupils equal and reactive to light. Dry mucous membranes. Tympanic membranes clear. NECK:  Supple. No meningeal signs. No lymphadenopathy. CHEST:  Clear to auscultation bilaterally. CORONARY:  S1 and S2 heard. ABDOMEN:  Soft. Nontender. Nondistended. Bowel sounds are physiological. 
EXTREMITIES:  No clubbing, no cyanosis, no edema. NEURO/PSYCH:  Pleasant mood and affect. Cranial nerves II-XII are grossly intact. Sensory grossly within normal limits. DTRs 2+ x4. Strength 5/5. SKIN:  Warm. LABORATORY DATA:  White count 11.2, hemoglobin 14, hematocrit 43.9, platelets 271. Sodium 139, potassium 4.8, chloride 106, bicarbonate 28, anion gap 5, glucose 83, BUN 10, creatinine 1.01, calcium 9.8, bilirubin total 0.4, ALT 24, AST 32, alkaline phosphatase 101. Glucose 83. CT of the head shows no acute pathology. ASSESSMENT AND PLAN: 
1. Left-sided weakness, cerebrovascular accident versus complex migraines. The patient will be admitted on a telemetry bed. We will get an MRI of the brain and start the patient on baby aspirin.   We will provide IV hydration, Neurology consult, neurovascular checks, supportive care, start the patient on Fioricet, and further intervention per hospital course. Await Neurology input and we will reassess as needed. Continue to closely monitor. We will have Physical Therapy/Occupational Therapy and Speech evaluation. 2.  Headaches, likely secondary to migraine. The patient is started on Fioricet. We will continue methadone. Avoid opioids. Continue to closely monitor. Reassess as needed. 3.  Leukocytosis, unclear etiology. No obvious signs of infection. Chest x-ray and UA pending. We will monitor for now. Repeat laboratories in the morning, probably secondary to left-sided weakness, cerebrovascular accident versus complex migraines. 4.  Hypertension, poorly controlled. We will provide p.r.n. antihypertensives, and we will continue to closely monitor. Continue home medications. Reassess as needed. 5.  Gastrointestinal and deep venous thrombosis prophylaxis. The patient will be on sequential compression devices. Vishnu Hi MD [id number] D:  05/06/2019 15:14 
T:  05/06/2019 16:18 
JOB #:  [id number]

## 2019-05-06 NOTE — LETTER
[address] 
[facility] [id number] 
[phone] Work/School Note Date: 5/6/2019 To Whom It May concern: 
 
Samir Mack was seen and treated today in the hospital from 5/6/2019 to 5/9/2019. Sincerely, Ricky Augustin MD

## 2019-05-07 ENCOUNTER — APPOINTMENT (OUTPATIENT)
Dept: CT IMAGING | Age: 39
DRG: 045 | End: 2019-05-07
Attending: INTERNAL MEDICINE
Payer: MEDICAID

## 2019-05-07 ENCOUNTER — APPOINTMENT (OUTPATIENT)
Dept: NON INVASIVE DIAGNOSTICS | Age: 39
DRG: 045 | End: 2019-05-07
Attending: PSYCHIATRY & NEUROLOGY
Payer: MEDICAID

## 2019-05-07 ENCOUNTER — APPOINTMENT (OUTPATIENT)
Dept: MRI IMAGING | Age: 39
DRG: 045 | End: 2019-05-07
Attending: FAMILY MEDICINE
Payer: MEDICAID

## 2019-05-07 PROBLEM — I63.9 CVA (CEREBRAL VASCULAR ACCIDENT) (HCC): Status: ACTIVE | Noted: 2019-05-07

## 2019-05-07 LAB
CHOLEST SERPL-MCNC: 188 MG/DL
CK SERPL-CCNC: 39 U/L (ref 26–192)
ERYTHROCYTE [DISTWIDTH] IN BLOOD BY AUTOMATED COUNT: 15.2 % (ref 11.5–14.5)
EST. AVERAGE GLUCOSE BLD GHB EST-MCNC: 117 MG/DL
GLUCOSE BLD STRIP.AUTO-MCNC: 103 MG/DL (ref 65–100)
GLUCOSE BLD STRIP.AUTO-MCNC: 72 MG/DL (ref 65–100)
GLUCOSE BLD STRIP.AUTO-MCNC: 76 MG/DL (ref 65–100)
GLUCOSE BLD STRIP.AUTO-MCNC: 86 MG/DL (ref 65–100)
HBA1C MFR BLD: 5.7 % (ref 4.2–6.3)
HCT VFR BLD AUTO: 38.8 % (ref 35–47)
HDLC SERPL-MCNC: 37 MG/DL
HDLC SERPL: 5.1 {RATIO} (ref 0–5)
HGB BLD-MCNC: 11.9 G/DL (ref 11.5–16)
LDLC SERPL CALC-MCNC: 103 MG/DL (ref 0–100)
LIPID PROFILE,FLP: ABNORMAL
MCH RBC QN AUTO: 26.2 PG (ref 26–34)
MCHC RBC AUTO-ENTMCNC: 30.7 G/DL (ref 30–36.5)
MCV RBC AUTO: 85.3 FL (ref 80–99)
NRBC # BLD: 0 K/UL (ref 0–0.01)
NRBC BLD-RTO: 0 PER 100 WBC
PLATELET # BLD AUTO: 235 K/UL (ref 150–400)
PMV BLD AUTO: 10.9 FL (ref 8.9–12.9)
RBC # BLD AUTO: 4.55 M/UL (ref 3.8–5.2)
SERVICE CMNT-IMP: ABNORMAL
SERVICE CMNT-IMP: NORMAL
TRIGL SERPL-MCNC: 240 MG/DL (ref ?–150)
TROPONIN I SERPL-MCNC: <0.05 NG/ML
TSH SERPL DL<=0.05 MIU/L-ACNC: 0.79 UIU/ML (ref 0.36–3.74)
VLDLC SERPL CALC-MCNC: 48 MG/DL
WBC # BLD AUTO: 6.6 K/UL (ref 3.6–11)

## 2019-05-07 PROCEDURE — 97162 PT EVAL MOD COMPLEX 30 MIN: CPT

## 2019-05-07 PROCEDURE — 74011636320 HC RX REV CODE- 636/320: Performed by: RADIOLOGY

## 2019-05-07 PROCEDURE — 83036 HEMOGLOBIN GLYCOSYLATED A1C: CPT

## 2019-05-07 PROCEDURE — 74011250636 HC RX REV CODE- 250/636: Performed by: FAMILY MEDICINE

## 2019-05-07 PROCEDURE — 84484 ASSAY OF TROPONIN QUANT: CPT

## 2019-05-07 PROCEDURE — 70551 MRI BRAIN STEM W/O DYE: CPT

## 2019-05-07 PROCEDURE — 80061 LIPID PANEL: CPT

## 2019-05-07 PROCEDURE — 94762 N-INVAS EAR/PLS OXIMTRY CONT: CPT

## 2019-05-07 PROCEDURE — 84443 ASSAY THYROID STIM HORMONE: CPT

## 2019-05-07 PROCEDURE — 70496 CT ANGIOGRAPHY HEAD: CPT

## 2019-05-07 PROCEDURE — 99218 HC RM OBSERVATION: CPT

## 2019-05-07 PROCEDURE — 74011250637 HC RX REV CODE- 250/637: Performed by: FAMILY MEDICINE

## 2019-05-07 PROCEDURE — 82962 GLUCOSE BLOOD TEST: CPT

## 2019-05-07 PROCEDURE — 36415 COLL VENOUS BLD VENIPUNCTURE: CPT

## 2019-05-07 PROCEDURE — 92610 EVALUATE SWALLOWING FUNCTION: CPT | Performed by: SPEECH-LANGUAGE PATHOLOGIST

## 2019-05-07 PROCEDURE — 82550 ASSAY OF CK (CPK): CPT

## 2019-05-07 PROCEDURE — 70498 CT ANGIOGRAPHY NECK: CPT

## 2019-05-07 PROCEDURE — 85027 COMPLETE CBC AUTOMATED: CPT

## 2019-05-07 PROCEDURE — 65660000000 HC RM CCU STEPDOWN

## 2019-05-07 RX ORDER — LOSARTAN POTASSIUM 50 MG/1
100 TABLET ORAL DAILY
Status: DISCONTINUED | OUTPATIENT
Start: 2019-05-08 | End: 2019-05-09 | Stop reason: HOSPADM

## 2019-05-07 RX ORDER — SODIUM CHLORIDE 0.9 % (FLUSH) 0.9 %
10 SYRINGE (ML) INJECTION
Status: COMPLETED | OUTPATIENT
Start: 2019-05-07 | End: 2019-05-07

## 2019-05-07 RX ADMIN — IOPAMIDOL 100 ML: 755 INJECTION, SOLUTION INTRAVENOUS at 18:55

## 2019-05-07 RX ADMIN — Medication 10 ML: at 18:55

## 2019-05-07 RX ADMIN — Medication 10 ML: at 21:06

## 2019-05-07 RX ADMIN — HYDRALAZINE HYDROCHLORIDE 10 MG: 20 INJECTION INTRAMUSCULAR; INTRAVENOUS at 12:19

## 2019-05-07 RX ADMIN — PRAVASTATIN SODIUM 40 MG: 40 TABLET ORAL at 21:04

## 2019-05-07 RX ADMIN — Medication 10 ML: at 06:45

## 2019-05-07 RX ADMIN — HEPARIN SODIUM 5000 UNITS: 5000 INJECTION INTRAVENOUS; SUBCUTANEOUS at 18:45

## 2019-05-07 RX ADMIN — LOSARTAN POTASSIUM 50 MG: 50 TABLET ORAL at 08:40

## 2019-05-07 RX ADMIN — METHADONE HYDROCHLORIDE 120 MG: 5 SOLUTION ORAL at 12:00

## 2019-05-07 RX ADMIN — ALPRAZOLAM 0.25 MG: 0.25 TABLET ORAL at 08:39

## 2019-05-07 RX ADMIN — Medication 10 ML: at 15:19

## 2019-05-07 RX ADMIN — HEPARIN SODIUM 5000 UNITS: 5000 INJECTION INTRAVENOUS; SUBCUTANEOUS at 02:49

## 2019-05-07 RX ADMIN — HEPARIN SODIUM 5000 UNITS: 5000 INJECTION INTRAVENOUS; SUBCUTANEOUS at 11:19

## 2019-05-07 RX ADMIN — FAMOTIDINE 20 MG: 20 TABLET ORAL at 21:07

## 2019-05-07 RX ADMIN — BUTALBITAL, ACETAMINOPHEN AND CAFFEINE 1 TABLET: 50; 325; 40 TABLET ORAL at 15:18

## 2019-05-07 RX ADMIN — FAMOTIDINE 20 MG: 20 TABLET ORAL at 08:40

## 2019-05-07 RX ADMIN — ASPIRIN 81 MG 81 MG: 81 TABLET ORAL at 08:40

## 2019-05-07 NOTE — PROGRESS NOTES
Bedside and Verbal shift change report given to Alfredito Garcia RN (oncoming nurse) by Boone Pat RN (offgoing nurse). Report included the following information SBAR, Kardex, Intake/Output, MAR, Recent Results and Cardiac Rhythm SR/SB.

## 2019-05-07 NOTE — CONSULTS
Consult dictated. Multiple embolic appearing infarcts in both hemispheres, with largest infarct in the right MCA territory. Check CTA, echocardiogram with bubble. Start aspirin.    Marc Lara MD

## 2019-05-07 NOTE — ROUTINE PROCESS
Notified MD of BG of 73. Pt NPO for CTA and showing no signs of distress and no history of diabetes. Was advised to wait for patient to return from CT to give food.

## 2019-05-07 NOTE — CONSULTS
[address]    Name:  Cathy Soulier  MR#:  [id number]  :  1980  ACCOUNT #:  [de-identified]  DATE OF SERVICE:  2019      REQUESTING PHYSICIAN:  Dr. Al Merino. REASON FOR EVALUATION:  Stroke. HISTORY OF PRESENT ILLNESS:  The patient is a 79-year-old -American female with history of opioid dependence, migraine headaches who says that this past  she suddenly developed facial weakness on the left side, had difficulties expressing herself and at the same time she had weakness and numbness in the left upper and lower extremities. She was brought into the emergency department as a stroke alert and she was evaluated by Teleneurology and given her history of migraines and previous neurological deficits with her headaches, this was suspected to be the same. However, her symptoms persisted and intravenous t-PA was considered, but then it was decided not to give it to her. She eventually started to feel somewhat better and decided to leave the emergency department against medical advice. The next day, the patient's symptoms persisted and she decided to come back to the hospital and was readmitted. She continues to experience left-sided weakness and had MRI scan of the brain done earlier today which showed multiple acute infarctions in both cerebral hemispheres. Denies any previous stroke. No chest pain, shortness of breath, palpitations, nausea, or vomiting. PAST MEDICAL HISTORY:  As mentioned above. HOME MEDICATIONS:  Metoprolol, losartan, and methadone. SOCIAL HISTORY:  Smokes about half a pack of cigarettes per day. No alcohol use. Lives at home. FAMILY HISTORY:  Mother with history of hypertension. Sister has hypertension. One of her cousins recently passed away from myocardial infarction. ALLERGIES:  AMLODIPINE, HYDROCHLOROTHIAZIDE, AND NSAIDs. REVIEW OF SYSTEMS:  As mentioned above.     PHYSICAL EXAMINATION:  GENERAL:  The patient is alert, fully oriented. VITAL SIGNS:  Blood pressure 153/98, temperature is 97.8, pulse is 58. NEUROLOGIC:  Speech is clear. Comprehension is normal.  Pupils are equal, round, reactive. Extraocular movements are full. Visual fields are intact. Hearing is baseline. Facial sensation is impaired to light touch on the left face. Muscle tone and bulk normal.  Strength is 4/5 in the left upper extremity and left lower extremity. Sensation is impaired to light touch in the entire left upper and lower extremities. Deep tendon reflexes 2/2 symmetric. Toe upgoing on the left. Gait is unsteady and she favors her left lower extremity. HEART:  Regular rate and rhythm. CHEST:  Clear. ABDOMEN:  Soft, nontender. Positive bowel sounds. EXTREMITIES:  No edema. LABORATORY DATA:  CBC is unremarkable. Chemistries unremarkable. Hemoglobin A1c is 5.7. MRI scan of the brain images were independently reviewed. There are multiple areas of acute infarction with most prominent infarct in the right MCA territory. There is an area of infarction in the right centrum semiovale, right occipital lobe, right lateral frontal lobe, left posterior frontal lobe. ASSESSMENT AND PLAN:  A 72-year-old female with history of hypertension, borderline diabetes, and smoking who is admitted with multiple embolic appearing infarcts in both hemispheres, i.e., in different vascular distributions with largest infarct in the right middle cerebral artery territory. Central cardiac source remains a concern for we will check CT angiogram of the head and neck and echocardiogram with bubble study. She has been started on aspirin and statin. Continue with telemetry monitoring. We will follow along with you. Thank you for this consultation.       Viki Redd MD      [id number]  D:  05/07/2019 11:55  T:  05/07/2019 12:02  JOB #:  [id number]

## 2019-05-07 NOTE — PROGRESS NOTES
Hospitalist Progress Note Hospital summary: The patient is a 46-year-old female with past medical history of opioid dependence, history of migraines, who presents to the hospital with the above-mentioned symptom. The patient reports that yesterday she started having significant headache associated with some left arm and left lower extremity weakness. The patient reports that she came to the ER as a stroke alert. Per chart, the patient was seen by Teleneurology who thought the patient's symptoms were secondary to migraine versus CVA and was requested to be admitted for rule out. The patient had some social issues and had some minors at home, and thus was not able to stay in the hospital.  The patient reports the headache persisted. She continued to feel some left-sided weakness. Although it got much better, she got concerned and decided to come to the hospital.  The patient reports that she has never had these symptoms before with the migraine. She reports that her weakness has improved somewhat, but it still persists. 5/6/2019 Assessment/Plan: 
Acute CVA 
-  Presented with left sided weakness 
- CT head: No acute intracranial process - MRI: Multiple areas of acute infarction, involving the right MCA territory and posterior left frontal lobe. - neurology on board; recs: CTA , echo 
- A1c normal. Lipid panel pending 
- echo ordered - c/w Aspirin and statin 
- PT/OT 
 
HTN - uncontrolled 
- increased losartan 100mg daily 
- will dc metoprolol as patient is bradycardiac 
- hydralazine prn Tobacco abuse - nicotine tach Hx opioid dependence and IV drug abuse: on methadone. UDS ordered Code status: Full DVT prophylaxis:  
Disposition: TBD 
---------------------------------------------- 
 
CC: left side weakness S: Patient is seen and examined at bedside. Family present. She still has left side weakness but improving. Explained the MRI report.  She denied any recent IV drug abuse. Review of Systems: A comprehensive review of systems was negative. O: 
Visit Vitals BP (!) 153/98 (BP 1 Location: Right arm, BP Patient Position: Post activity) Pulse (!) 58 Temp 97.8 °F (36.6 °C) Resp 12 Ht 5' 4\" (1.626 m) Wt 57 kg (125 lb 10.6 oz) SpO2 99% Breastfeeding? No  
BMI 21.57 kg/m² PHYSICAL EXAM: 
Gen: NAD, non-toxic HEENT: anicteric sclerae, normal conjunctiva, oropharynx clear, MM moist 
Neck: supple, trachea midline, no adenopathy Heart: RRR, no MRG, no JVD, no peripheral edema Lungs: CTA b/l, non-labored respirations Abd: soft, NT, ND, BS+, no organomegaly Extr: warm Skin: dry, no rash Neuro: CN II-XII grossly intact, normal speech, moves all extremities Psych: normal mood, appropriate affect No intake or output data in the 24 hours ending 05/07/19 1135 Recent labs & imaging reviewed: 
Recent Results (from the past 24 hour(s)) CBC WITH AUTOMATED DIFF Collection Time: 05/06/19  1:47 PM  
Result Value Ref Range WBC 11.2 (H) 3.6 - 11.0 K/uL  
 RBC 5.18 3.80 - 5.20 M/uL  
 HGB 14.0 11.5 - 16.0 g/dL HCT 43.9 35.0 - 47.0 % MCV 84.7 80.0 - 99.0 FL  
 MCH 27.0 26.0 - 34.0 PG  
 MCHC 31.9 30.0 - 36.5 g/dL  
 RDW 15.5 (H) 11.5 - 14.5 % PLATELET 131 858 - 980 K/uL MPV 11.3 8.9 - 12.9 FL  
 NRBC 0.0 0  WBC ABSOLUTE NRBC 0.00 0.00 - 0.01 K/uL NEUTROPHILS 75 32 - 75 % LYMPHOCYTES 19 12 - 49 % MONOCYTES 6 5 - 13 % EOSINOPHILS 0 0 - 7 % BASOPHILS 0 0 - 1 % IMMATURE GRANULOCYTES 0 0.0 - 0.5 % ABS. NEUTROPHILS 8.4 (H) 1.8 - 8.0 K/UL  
 ABS. LYMPHOCYTES 2.2 0.8 - 3.5 K/UL  
 ABS. MONOCYTES 0.7 0.0 - 1.0 K/UL  
 ABS. EOSINOPHILS 0.0 0.0 - 0.4 K/UL  
 ABS. BASOPHILS 0.0 0.0 - 0.1 K/UL  
 ABS. IMM. GRANS. 0.0 0.00 - 0.04 K/UL  
 DF AUTOMATED URINALYSIS W/MICROSCOPIC Collection Time: 05/06/19  4:31 PM  
Result Value Ref Range Color DARK YELLOW  Appearance CLOUDY (A) CLEAR    
 Specific gravity 1.028 1.003 - 1.030    
 pH (UA) 6.0 5.0 - 8.0 Protein NEGATIVE  NEG mg/dL Glucose NEGATIVE  NEG mg/dL Ketone TRACE (A) NEG mg/dL Bilirubin NEGATIVE  NEG Blood SMALL (A) NEG Urobilinogen 1.0 0.2 - 1.0 EU/dL Nitrites NEGATIVE  NEG Leukocyte Esterase TRACE (A) NEG    
 WBC 0-4 0 - 4 /hpf  
 RBC 10-20 0 - 5 /hpf Epithelial cells MODERATE (A) FEW /lpf Bacteria NEGATIVE  NEG /hpf Hyaline cast 2-5 0 - 5 /lpf URINE CULTURE HOLD SAMPLE Collection Time: 05/06/19  4:31 PM  
Result Value Ref Range Urine culture hold URINE ON HOLD IN MICROBIOLOGY DEPT FOR 3 DAYS. IF UNPRESERVED URINE IS SUBMITTED, IT CANNOT BE USED FOR ADDITIONAL TESTING AFTER 24 HRS, RECOLLECTION WILL BE REQUIRED. GLUCOSE, POC Collection Time: 05/06/19  9:37 PM  
Result Value Ref Range Glucose (POC) 112 (H) 65 - 100 mg/dL Performed by Francisco Sánchez CK Collection Time: 05/06/19  9:50 PM  
Result Value Ref Range CK 52 26 - 192 U/L  
TROPONIN I Collection Time: 05/06/19  9:50 PM  
Result Value Ref Range Troponin-I, Qt. <0.05 <0.05 ng/mL HEMOGLOBIN A1C WITH EAG Collection Time: 05/07/19  2:30 AM  
Result Value Ref Range Hemoglobin A1c 5.7 4.2 - 6.3 % Est. average glucose 117 mg/dL CBC W/O DIFF Collection Time: 05/07/19  2:30 AM  
Result Value Ref Range WBC 6.6 3.6 - 11.0 K/uL  
 RBC 4.55 3.80 - 5.20 M/uL  
 HGB 11.9 11.5 - 16.0 g/dL HCT 38.8 35.0 - 47.0 % MCV 85.3 80.0 - 99.0 FL  
 MCH 26.2 26.0 - 34.0 PG  
 MCHC 30.7 30.0 - 36.5 g/dL  
 RDW 15.2 (H) 11.5 - 14.5 % PLATELET 396 437 - 364 K/uL MPV 10.9 8.9 - 12.9 FL  
 NRBC 0.0 0  WBC ABSOLUTE NRBC 0.00 0.00 - 0.01 K/uL GLUCOSE, POC Collection Time: 05/07/19  7:32 AM  
Result Value Ref Range Glucose (POC) 76 65 - 100 mg/dL Performed by Virginia Caceres GLUCOSE, POC Collection Time: 05/07/19  7:46 AM  
Result Value Ref Range Glucose (POC) 86 65 - 100 mg/dL Performed by Aviva Lopez Recent Labs 05/07/19 
0230 05/06/19 
1347 WBC 6.6 11.2* HGB 11.9 14.0  
HCT 38.8 43.9  264 Recent Labs 05/05/19 
1717   
K 4.8  
 CO2 28 BUN 10  
CREA 1.01  
GLU 83  
CA 9.8 Recent Labs 05/05/19 
1717 SGOT 32 ALT 24  TBILI 0.4 TP 9.4* ALB 3.7 GLOB 5.7* No results for input(s): INR, PTP, APTT in the last 72 hours. No lab exists for component: INREXT No results for input(s): FE, TIBC, PSAT, FERR in the last 72 hours. No results found for: FOL, RBCF No results for input(s): PH, PCO2, PO2 in the last 72 hours. Recent Labs 05/06/19 
2150 CPK 52 TROIQ <0.05 Lab Results Component Value Date/Time Cholesterol, total 167 11/03/2017 10:22 AM  
 HDL Cholesterol 29 (L) 11/03/2017 10:22 AM  
 LDL, calculated 114 (H) 11/03/2017 10:22 AM  
 Triglyceride 120 11/03/2017 10:22 AM  
 
Lab Results Component Value Date/Time Glucose (POC) 86 05/07/2019 07:46 AM  
 Glucose (POC) 76 05/07/2019 07:32 AM  
 Glucose (POC) 112 (H) 05/06/2019 09:37 PM  
 Glucose (POC) 99 05/05/2019 04:34 PM  
 Glucose (POC) 110 (H) 03/01/2015 12:03 AM  
 
Lab Results Component Value Date/Time Color DARK YELLOW 05/06/2019 04:31 PM  
 Appearance CLOUDY (A) 05/06/2019 04:31 PM  
 Specific gravity 1.028 05/06/2019 04:31 PM  
 Specific gravity 1.020 10/12/2009 11:53 AM  
 pH (UA) 6.0 05/06/2019 04:31 PM  
 Protein NEGATIVE  05/06/2019 04:31 PM  
 Glucose NEGATIVE  05/06/2019 04:31 PM  
 Ketone TRACE (A) 05/06/2019 04:31 PM  
 Bilirubin NEGATIVE  05/06/2019 04:31 PM  
 Urobilinogen 1.0 05/06/2019 04:31 PM  
 Nitrites NEGATIVE  05/06/2019 04:31 PM  
 Leukocyte Esterase TRACE (A) 05/06/2019 04:31 PM  
 Epithelial cells MODERATE (A) 05/06/2019 04:31 PM  
 Bacteria NEGATIVE  05/06/2019 04:31 PM  
 WBC 0-4 05/06/2019 04:31 PM  
 RBC 10-20 05/06/2019 04:31 PM  
 
 
Med list reviewed Current Facility-Administered Medications Medication Dose Route Frequency  losartan (COZAAR) tablet 50 mg  50 mg Oral DAILY  methadone (DOLOPHINE) 5 mg/5 mL oral solution 120 mg  120 mg Oral DAILY  sodium chloride (NS) flush 5-40 mL  5-40 mL IntraVENous Q8H  
 sodium chloride (NS) flush 5-40 mL  5-40 mL IntraVENous PRN  
 acetaminophen (TYLENOL) tablet 650 mg  650 mg Oral Q4H PRN Or  
 acetaminophen (TYLENOL) solution 650 mg  650 mg Per NG tube Q4H PRN Or  
 acetaminophen (TYLENOL) suppository 650 mg  650 mg Rectal Q4H PRN  
 aspirin chewable tablet 81 mg  81 mg Oral DAILY  pravastatin (PRAVACHOL) tablet 40 mg  40 mg Oral QHS  famotidine (PEPCID) tablet 20 mg  20 mg Oral Q12H  
 heparin (porcine) injection 5,000 Units  5,000 Units SubCUTAneous Q8H  
 butalbital-acetaminophen-caffeine (FIORICET, ESGIC) -40 mg per tablet 1 Tab  1 Tab Oral Q4H PRN  
 hydrALAZINE (APRESOLINE) 20 mg/mL injection 10 mg  10 mg IntraVENous Q6H PRN  
 nicotine (NICODERM CQ) 21 mg/24 hr patch 1 Patch  1 Patch TransDERmal Q24H Care Plan discussed with:  Patient/Family, Nurse and  Ariadne Jacobo MD 
Internal Medicine Date of Service: 5/7/2019

## 2019-05-07 NOTE — PROGRESS NOTES
Spiritual Care Assessment/Progress Note [address] 
 
 
NAME: Ivon Singh      MRN: [id number] AGE: 41 y.o. SEX: female [employer]al Affiliation: [employer]  
Language: [place] 5/7/2019     Total Time (in minutes): 48  
 
Spiritual Assessment begun in [facility] 6S NEURO-SCI TELE through conversation with: 
  
    [x]Patient        [] Family    [] Friend(s) Reason for Consult: Advance medical directive consult Spiritual beliefs: (Please include comment if needed) [x] Identifies with a darlin tradition:     
   [] Supported by a darlin community:        
   [] Claims no spiritual orientation:       
   [] Seeking spiritual identity:            
   [] Adheres to an individual form of spirituality:       
   [] Not able to assess:                   
 
    
Identified resources for coping:  
   [] Prayer                           
   [] Music                  [] Guided Imagery [x] Family/friends                 [] Pet visits [] Devotional reading                         [] Unknown 
   [] Other:                                        
 
 
Interventions offered during this visit: (See comments for more details) Patient Interventions: Advance medical directive consult, Affirmation of emotions/emotional suffering, Affirmation of darlin Plan of Care: 
 
 [] Support spiritual and/or cultural needs [x] Support AMD and/or advance care planning process    
 [] Support grieving process 
 [] Coordinate Rites and/or Rituals  
 [] Coordination with community clergy [] No spiritual needs identified at this time 
 [] Detailed Plan of Care below (See Comments)  [] Make referral to Music Therapy 
[] Make referral to Pet Therapy    
[] Make referral to Addiction services 
[] Make referral to [facility] 
[] Make referral to Spiritual Care Partner 
[] No future visits requested       
[x] Follow up visits as needed Visited pt in response to an In-Basket request to assist with an Advance Medical Directive (AMD). Explained document to patient, who chose not to complete an AMD at this time. She will first have a conversation with her family. She was encouraged to have a  paged if she later chooses to complete an AMD. Chaplains will continue to offer support as needed. Chaplain Greco MDiv, MS, [facility] 
[phone])

## 2019-05-07 NOTE — PROGRESS NOTES
Problem: TIA/CVA Stroke: 0-24 hours Goal: Activity/Safety Outcome: Progressing Towards Goal 
Goal: Consults, if ordered Outcome: Progressing Towards Goal 
Goal: Diagnostic Test/Procedures Outcome: Progressing Towards Goal 
Goal: Nutrition/Diet Outcome: Progressing Towards Goal 
Goal: Discharge Planning Outcome: Progressing Towards Goal 
Goal: Medications Outcome: Progressing Towards Goal 
Goal: Respiratory Outcome: Progressing Towards Goal 
Goal: Treatments/Interventions/Procedures Outcome: Progressing Towards Goal 
Goal: Minimize risk of bleeding post-thrombolytic infusion Outcome: Progressing Towards Goal 
Goal: Monitor for complications post-thrombolytic infusion Outcome: Progressing Towards Goal 
Goal: Psychosocial 
Outcome: Progressing Towards Goal 
Goal: *Hemodynamically stable Outcome: Progressing Towards Goal 
Goal: *Neurologically stable Description Absence of additional neurological deficits Outcome: Progressing Towards Goal 
Goal: *Verbalizes anxiety and depression are reduced or absent Outcome: Progressing Towards Goal 
Goal: *Absence of Signs of Aspiration on Current Diet Outcome: Progressing Towards Goal 
Goal: *Absence of deep venous thrombosis signs and symptoms(Stroke Metric) Outcome: Progressing Towards Goal 
Goal: *Ability to perform ADLs and demonstrates progressive mobility and function Outcome: Progressing Towards Goal 
Goal: *Stroke education started(Stroke Metric) Outcome: Progressing Towards Goal 
Goal: *Dysphagia screen performed(Stroke Metric) Outcome: Progressing Towards Goal 
Goal: *Rehab consulted(Stroke Metric) Outcome: Progressing Towards Goal

## 2019-05-07 NOTE — PROGRESS NOTES
Speech Pathology bedside swallow evaluation/discharge Patient: Patricia Jackman (48 y.o. female) Date: 5/7/2019 Primary Diagnosis: TIA (transient ischemic attack) [G45.9] Precautions:     
 
ASSESSMENT : 
Based on the objective data described below, the patient presents with intact oropharyngeal swallow. Patient tolerated all PO without overt s/s aspiration. Patient, family and RN report no difficulty swallowing. Risk of aspiration is low. No further SLP treatment warranted for dysphagia however SLP to follow up for neuro-linguistic evaluation as medically appropriate. Skilled acute therapy provided by a speech-language pathologist is not indicated at this time. PLAN : 
Recommendations: 
Regular diet, thin liquids SLP to follow for neuro-lingustic evaluation at medically appropriate Discharge Recommendations: To Be Determined SUBJECTIVE:  
Patient stated I'm not having any trouble.  when asked about swallowing. Patient reports lapses in memory and  
reports trying to leave the house with only 1 shoe on (left shoe on right foot.)  RN reports no difficulty swallowing. OBJECTIVE:  
 
Past Medical History:  
Diagnosis Date  ex- IVDU (intravenous drug user)  H/O hysterectomy for benign disease  Hypertension  Major depression, recurrent ([facility] [address]  Methadone maintenance therapy patient ([facility] [address]  Other ill-defined conditions(799.89) ovarian cyst  
 PTSD (post-traumatic stress disorder)  Thalassemia Past Surgical History:  
Procedure Laterality Date  HX GYN    
 HX HEENT    
 t&a  HX ARIANA AND BSO  2009 Endometriosis, Uterine Septum Prior Level of Function/Home Situation:  
Home Situation Home Environment: Private residence # Steps to Enter: 0 One/Two Story Residence: Split level # of Interior Steps: 21 Height of Each Step (in): 6 inches Interior Rails: None Lift Chair Available: No 
Living Alone: No 
 Support Systems: Child(neymar), Family member(s), Friends \ neighbors, Spouse/Significant Other/Partner Patient Expects to be Discharged to[de-identified] Private residence Current DME Used/Available at Home: Blood pressure cuff Diet prior to admission: Regular, thin Current Diet:  Regular thin Cognitive and Communication Status: 
Neurologic State: Alert Orientation Level: Oriented to person, Oriented to place, Oriented to situation, Oriented to time Cognition: Appropriate for age attention/concentration, Follows commands Perseveration: No perseveration noted Safety/Judgement: Awareness of environment, Insight into deficits Oral Assessment: 
Oral Assessment Labial: No impairment Dentition: Natural;Intact Oral Hygiene: Moist and clean oral mucosa Lingual: No impairment Velum: Unable to visualize Mandible: No impairment P.O. Trials: 
  
Vocal quality prior to P.O.: No impairment Consistency Presented: Thin liquid; Solid How Presented: Self-fed/presented;Straw;Successive swallows Bolus Acceptance: No impairment Bolus Formation/Control: No impairment Propulsion: No impairment Oral Residue: None Initiation of Swallow: No impairment Laryngeal Elevation: Functional 
Aspiration Signs/Symptoms: None Pharyngeal Phase Characteristics: No impairment, issues, or problems Oral Phase Severity: No impairment Pharyngeal Phase Severity : No impairment NOMS:  
The NOMS functional outcome measure was used to quantify this patient's level of swallowing impairment. Based on the NOMS, the patient was determined to be at level 7 for swallow function NOMS Swallowing Levels: 
Level 1 (CN): NPO Level 2 (CM): NPO but takes consistency in therapy Level 3 (CL): Takes less than 50% of nutrition p.o. and continues with nonoral feedings; and/or safe with mod cues; and/or max diet restriction Level 4 (CK):  Safe swallow but needs mod cues; and/or mod diet restriction; and/or still requires some nonoral feeding/supplements Level 5 (CJ): Safe swallow with min diet restriction; and/or needs min cues Level 6 (CI): Independent with p.o.; rare cues; usually self cues; may need to avoid some foods or needs extra time Level 7 (CH): Independent for all p.o. NILO. (2003). National Outcomes Measurement System (NOMS): Adult Speech-Language Pathology User's Guide. After treatment:  
[] Patient left in no apparent distress sitting up in chair 
[x] Patient left in no apparent distress in bed 
[x] Call bell left within reach [x] Nursing notified 
[x] Caregiver present 
[] Bed alarm activated COMMUNICATION/EDUCATION:  
The patients plan of care including findings, recommendations, and recommended diet changes were discussed with: Registered Nurse. Patient was educated regarding role of SLP, POC [] Posted safety precautions in patient's room. [x] Patient/family have participated as able and agree with findings and recommendations. [] Patient is unable to participate in plan of care at this time. Thank you for this referral. 
MERI Roberts Time Calculation: 14 mins

## 2019-05-07 NOTE — PROGRESS NOTES
Occupational Therapy Chart reviewed. Attempted to see pt for OT assessment. Pt off the floor for MRI. Will continue to follow.  Ruthanne Hodgkin, OT

## 2019-05-07 NOTE — ROUTINE PROCESS
Bedside and Verbal shift change report given to \[facility]\"" (oncoming nurse) by Baldo Jacobson (offgoing nurse). Report included the following information SBAR, Kardex, Intake/Output, MAR, Accordion, Recent Results and Cardiac Rhythm NSR.

## 2019-05-07 NOTE — ROUTINE PROCESS
Bedside shift change report given to [place] (oncoming nurse) by [address] [place] [address] (offgoing nurse). Report included the following information SBAR, Kardex, Med Rec Status and Cardiac Rhythm NSR/SB.

## 2019-05-07 NOTE — PROGRESS NOTES
Problem: Mobility Impaired (Adult and Pediatric) Goal: *Acute Goals and Plan of Care (Insert Text) Description Physical Therapy Goals Initiated 5/7/2019 1. Patient will move from supine to sit and sit to supine , scoot up and down and roll side to side in bed with modified independence within 7 day(s). 2.  Patient will transfer from bed to chair and chair to bed with minimal assistance/contact guard assist using the least restrictive device within 7 day(s). 3.  Patient will perform sit to stand with minimal assistance/contact guard assist within 7 day(s). 4.  Patient will ambulate with minimal assistance/contact guard assist for 150 feet with the least restrictive device within 7 day(s). 5.  Patient will ascend/descend 6 stairs with single handrail(s) with minimal assistance/contact guard assist within 7 day(s). Outcome: Progressing Towards Goal 
 PHYSICAL THERAPY EVALUATION Patient: Sandra Velasquez (74 y.o. female) Date: 5/7/2019 Primary Diagnosis: TIA (transient ischemic attack) [G45.9] Precautions: Fall ASSESSMENT : 
Based on the objective data described below, the patient presents with impaired mobility compared to baseline function s/p admission for CVA, with MRI positive for \"multiple areas of acute infarction, involving the right MCA territory and posterior left frontal lobe\". At baseline, patient lives with fiance and children in a split level home. She is independent with ambulation without an AD and independent with all ADLs. This date, patient received walking back from the bathroom with assistance from her daughter. Patient demonstrated narrowed RIAN and unsteady gait, however unable to assess assist level based on observation. Patient transferred supine<>sitting with supervision and demonstrated good sitting balance. Mobility assessment limited due to hypertension, as patient's sitting BP was 186/119 in LUE and 186/114 in the RUE (RN notified). Patient asymptomatic, however further assessment and ambulation not tested at this time. Per report, patient states she had blurred vision and paresthesia in LUE and LLE which have resolved. Recommendation TBD pending further mobility assessment. Patient will benefit from skilled intervention to address the above impairments. Patient?s rehabilitation potential is considered to be Fair Factors which may influence rehabilitation potential include:  
? None noted ? Mental ability/status ? Medical condition ? Home/family situation and support systems ? Safety awareness 
? Pain tolerance/management 
? Other: PLAN : 
Recommendations and Planned Interventions: 
?           Bed Mobility Training             ? Neuromuscular Re-Education ? Transfer Training                   ? Orthotic/Prosthetic Training 
? Gait Training                         ? Modalities ? Therapeutic Exercises           ? Edema Management/Control ? Therapeutic Activities            ? Patient and Family Training/Education ? Other (comment): Frequency/Duration: Patient will be followed by physical therapy  5 times a week to address goals. Discharge Recommendations: To Be Determined Further Equipment Recommendations for Discharge: TBD SUBJECTIVE:  
Patient stated \"I didn't tell anyone, but my vision completely went away. I probably should have come to the hospital.? OBJECTIVE DATA SUMMARY:  
HISTORY:   
Past Medical History:  
Diagnosis Date  
 ex- IVDU (intravenous drug user) H/O hysterectomy for benign disease Hypertension Major depression, recurrent ([facility] [address] Methadone maintenance therapy patient ([facility] [address] Other ill-defined conditions(799.89) ovarian cyst  
 PTSD (post-traumatic stress disorder) Thalassemia Past Surgical History:  
Procedure Laterality Date  HX GYN    
 HX HEENT    
 t&a HX ARIANA AND BSO  2009 Endometriosis, Uterine Septum Prior Level of Function/Home Situation: At baseline, patient lives with fiance and children in a split level home. She is independent with ambulation without an AD and independent with all ADLs. Personal factors and/or comorbidities impacting plan of care: Support at home? Home Situation Home Environment: Private residence # Steps to Enter: 0 One/Two Story Residence: Split level # of Interior Steps: 21 Height of Each Step (in): 6 inches Interior Rails: None Lift Chair Available: No 
Living Alone: No 
Support Systems: Child(neymar), Family member(s), Friends \ neighbors, Spouse/Significant Other/Partner Patient Expects to be Discharged to[de-identified] Private residence Current DME Used/Available at Home: Blood pressure cuff EXAMINATION/PRESENTATION/DECISION MAKING:  
Critical Behavior: 
Neurologic State: Alert Orientation Level: Oriented to person, Oriented to place, Oriented to situation, Oriented to time Cognition: Appropriate for age attention/concentration, Follows commands Safety/Judgement: Awareness of environment, Insight into deficits Hearing: Auditory Auditory Impairment: None Skin:   
Edema:  
Range Of Motion: 
  
  
  
  
  
  
  
  
Strength: Tone & Sensation:  
Tone: Normal 
  
  
  
  
Sensation: Impaired(Paresthesia reported in LUE and LLE) Coordination: 
  
Vision:  
  
Functional Mobility: 
Bed Mobility: 
  
Supine to Sit: Supervision Sit to Supine: Supervision Transfers: 
Sit to Stand: Contact guard assistance Stand to Sit: Contact guard assistance Balance:  
Sitting: Intact Standing: Impaired; With support Standing - Static: Poor;Constant support Standing - Dynamic : Poor;Constant support Ambulation/Gait Training: 
Distance (ft): 15 Feet (ft) Assistive Device: (with arm around daughter) Ambulation - Level of Assistance: (observed pt walking with daughter, unable to assess) Gait Abnormalities: Decreased step clearance;Shuffling gait; Hemiplegic Base of Support: Narrowed Speed/Gely: Pace decreased (<100 feet/min); Shuffled Step Length: Left shortened;Right shortened Functional Measure: 
Colette Reyes not able to be completed due to hypertension, see above Physical Therapy Evaluation Charge Determination History Examination Presentation Decision-Making HIGH Complexity :3+ comorbidities / personal factors will impact the outcome/ POC  HIGH Complexity : 4+ Standardized tests and measures addressing body structure, function, activity limitation and / or participation in recreation  HIGH Complexity : Unstable and unpredictable characteristics  MEDIUM Complexity : FOTO score of 26-74 Based on the above components, the patient evaluation is determined to be of the following complexity level: MEDIUM Pain: 
Pain Scale 1: Numeric (0 - 10) Pain Intensity 1: 0 Activity Tolerance:  
Poor due to hypertension, see vitals above Please refer to the flowsheet for vital signs taken during this treatment. After treatment:  
?         Patient left in no apparent distress sitting up in chair ? Patient left in no apparent distress in bed 
? Call bell left within reach ? Nursing notified ? Family present ? Bed alarm activated COMMUNICATION/EDUCATION:  
The patient?s plan of care was discussed with: Occupational Therapist and Registered Nurse. ?         Fall prevention education was provided and the patient/caregiver indicated understanding. ? Patient/family have participated as able in goal setting and plan of care. ?         Patient/family agree to work toward stated goals and plan of care. ?         Patient understands intent and goals of therapy, but is neutral about his/her participation. ?         Patient is unable to participate in goal setting and plan of care. Thank you for this referral. 
Andrea Feliz, PT, DPT Time Calculation: 15 mins

## 2019-05-07 NOTE — ACP (ADVANCE CARE PLANNING)
Visited pt in response to an In-Basket request to assist with an Advance Medical Directive (AMD). Explained document to patient, who chose not to complete an AMD at this time. She will first have a conversation with her family. She was encouraged to have a  paged if she later chooses to complete an AMD. Chaplains will continue to offer support as needed.     Waleist Chaplain Watt MDiv, MS, [place] [phone] PRAY ([phone])

## 2019-05-07 NOTE — PROGRESS NOTES
Reason for Admission:  TIA  
                
RRAT Score: 6- LOW Plan for utilizing home health:  TBD Current Advanced Directive/Advance Care Plan: Pt does not have an AMD at this time and requesting further information. CM paged [address]. Likelihood of Readmission: LOW Transition of Care Plan: CM met with pt and family at bedside to discuss CM role and to assess pt needs. CM verified demographic information including insurance and emergency contacts. Pt would like to add Chelle chen (ph#: [phone]), to emergency contact list. Pt currently lives with april, children, and grandchild in a private residence. Pt reports no DME use and IADLs prior to admission. Pt uses [facility] on [address] and reports no issues with getting medications. Pt verified PCP and reports last visit was a month and a half ago. CM discussed observation letter with pt; pt given copies and signed copies placed on chart. Pt reports no concerns for discharge at this time. CM will await further orders should any needs arise. Care Management Interventions PCP Verified by CM: Yes 
Palliative Care Criteria Met (RRAT>21 & CHF Dx)?: No 
Mode of Transport at Discharge: Other (see comment)(Antonio Mclaughlin Fearing) Transition of Care Consult (CM Consult): Discharge Planning MyChart Signup: No(MyChart Active) Discharge Durable Medical Equipment: No 
Physical Therapy Consult: Yes Occupational Therapy Consult: Yes Speech Therapy Consult: Yes Current Support Network: Own Home, Other Confirm Follow Up Transport: Family Plan discussed with Pt/Family/Caregiver: Yes Discharge Location Discharge Placement: Home(Disposition TBD/subject to change pending care recommendations) Lore James RN, BSN Care Management Department

## 2019-05-07 NOTE — PROGRESS NOTES
Physical Therapy: Defer 918 - Chart reviewed. Attempted to see pt for PT evaluation. Pt off the floor for MRI. Will continue to follow. Leandro Hayes second attempt to see patient. Speech currently working with patient. Will continue to follow.   
 
Nicho Parsons, PT, DPT

## 2019-05-08 ENCOUNTER — APPOINTMENT (OUTPATIENT)
Dept: NON INVASIVE DIAGNOSTICS | Age: 39
DRG: 045 | End: 2019-05-08
Attending: PSYCHIATRY & NEUROLOGY
Payer: MEDICAID

## 2019-05-08 LAB
ECHO LA MAJOR AXIS: 2.16 CM
ECHO LV INTERNAL DIMENSION DIASTOLIC: 4.18 CM (ref 3.9–5.3)
ECHO LV INTERNAL DIMENSION SYSTOLIC: 2.32 CM
ECHO LV IVSD: 0.7 CM (ref 0.6–0.9)
ECHO LV MASS 2D: 112.1 G (ref 67–162)
ECHO LV MASS INDEX 2D: 71.7 G/M2 (ref 43–95)
ECHO LV POSTERIOR WALL DIASTOLIC: 0.91 CM (ref 0.6–0.9)
ECHO RV INTERNAL DIMENSION: 3.12 CM
ECHO RV TAPSE: 2.18 CM (ref 1.5–2)
FACT VIII ACT/NOR PPP: 223 % (ref 80–200)
HCYS SERPL-SCNC: 10.1 UMOL/L (ref 3.7–13.9)

## 2019-05-08 PROCEDURE — 74011250636 HC RX REV CODE- 250/636: Performed by: FAMILY MEDICINE

## 2019-05-08 PROCEDURE — 97112 NEUROMUSCULAR REEDUCATION: CPT

## 2019-05-08 PROCEDURE — 97535 SELF CARE MNGMENT TRAINING: CPT

## 2019-05-08 PROCEDURE — 85303 CLOT INHIBIT PROT C ACTIVITY: CPT

## 2019-05-08 PROCEDURE — 85240 CLOT FACTOR VIII AHG 1 STAGE: CPT

## 2019-05-08 PROCEDURE — 92523 SPEECH SOUND LANG COMPREHEN: CPT | Performed by: SPEECH-LANGUAGE PATHOLOGIST

## 2019-05-08 PROCEDURE — 74011250637 HC RX REV CODE- 250/637: Performed by: NURSE PRACTITIONER

## 2019-05-08 PROCEDURE — 65660000000 HC RM CCU STEPDOWN

## 2019-05-08 PROCEDURE — 85305 CLOT INHIBIT PROT S TOTAL: CPT

## 2019-05-08 PROCEDURE — 94762 N-INVAS EAR/PLS OXIMTRY CONT: CPT

## 2019-05-08 PROCEDURE — 85307 ASSAY ACTIVATED PROTEIN C: CPT

## 2019-05-08 PROCEDURE — 85300 ANTITHROMBIN III ACTIVITY: CPT

## 2019-05-08 PROCEDURE — 74011250637 HC RX REV CODE- 250/637: Performed by: INTERNAL MEDICINE

## 2019-05-08 PROCEDURE — 74011250637 HC RX REV CODE- 250/637: Performed by: FAMILY MEDICINE

## 2019-05-08 PROCEDURE — 97165 OT EVAL LOW COMPLEX 30 MIN: CPT

## 2019-05-08 PROCEDURE — 86147 CARDIOLIPIN ANTIBODY EA IG: CPT

## 2019-05-08 PROCEDURE — 81241 F5 GENE: CPT

## 2019-05-08 PROCEDURE — 93306 TTE W/DOPPLER COMPLETE: CPT

## 2019-05-08 PROCEDURE — 86146 BETA-2 GLYCOPROTEIN ANTIBODY: CPT

## 2019-05-08 PROCEDURE — 36415 COLL VENOUS BLD VENIPUNCTURE: CPT

## 2019-05-08 PROCEDURE — 83090 ASSAY OF HOMOCYSTEINE: CPT

## 2019-05-08 RX ORDER — PRAVASTATIN SODIUM 40 MG/1
40 TABLET ORAL
Qty: 30 TAB | Refills: 0 | Status: SHIPPED | OUTPATIENT
Start: 2019-05-08 | End: 2019-06-12 | Stop reason: SDUPTHER

## 2019-05-08 RX ORDER — CARVEDILOL 12.5 MG/1
12.5 TABLET ORAL 2 TIMES DAILY WITH MEALS
Status: DISCONTINUED | OUTPATIENT
Start: 2019-05-08 | End: 2019-05-09 | Stop reason: HOSPADM

## 2019-05-08 RX ORDER — CLOPIDOGREL BISULFATE 75 MG/1
75 TABLET ORAL DAILY
Status: DISCONTINUED | OUTPATIENT
Start: 2019-05-08 | End: 2019-05-09 | Stop reason: HOSPADM

## 2019-05-08 RX ORDER — CLOPIDOGREL BISULFATE 75 MG/1
75 TABLET ORAL DAILY
Qty: 30 TAB | Refills: 0 | Status: SHIPPED | OUTPATIENT
Start: 2019-05-08 | End: 2019-06-12 | Stop reason: SDUPTHER

## 2019-05-08 RX ORDER — SODIUM CHLORIDE 0.9 % (FLUSH) 0.9 %
30 SYRINGE (ML) INJECTION
Status: COMPLETED | OUTPATIENT
Start: 2019-05-08 | End: 2019-05-08

## 2019-05-08 RX ORDER — GUAIFENESIN 100 MG/5ML
81 LIQUID (ML) ORAL DAILY
Qty: 30 TAB | Refills: 0 | Status: SHIPPED | OUTPATIENT
Start: 2019-05-09 | End: 2019-06-12 | Stop reason: SDUPTHER

## 2019-05-08 RX ORDER — IBUPROFEN 200 MG
1 TABLET ORAL EVERY 24 HOURS
Qty: 30 PATCH | Refills: 0 | Status: SHIPPED | OUTPATIENT
Start: 2019-05-08 | End: 2019-06-07

## 2019-05-08 RX ORDER — LOSARTAN POTASSIUM 100 MG/1
100 TABLET ORAL DAILY
Qty: 30 TAB | Refills: 0 | Status: SHIPPED | OUTPATIENT
Start: 2019-05-09 | End: 2019-06-12 | Stop reason: SDUPTHER

## 2019-05-08 RX ADMIN — CARVEDILOL 12.5 MG: 12.5 TABLET, FILM COATED ORAL at 18:21

## 2019-05-08 RX ADMIN — METHADONE HYDROCHLORIDE 120 MG: 5 SOLUTION ORAL at 09:00

## 2019-05-08 RX ADMIN — CLOPIDOGREL BISULFATE 75 MG: 75 TABLET ORAL at 14:34

## 2019-05-08 RX ADMIN — ASPIRIN 81 MG 81 MG: 81 TABLET ORAL at 09:20

## 2019-05-08 RX ADMIN — PRAVASTATIN SODIUM 40 MG: 40 TABLET ORAL at 21:58

## 2019-05-08 RX ADMIN — ACETAMINOPHEN 650 MG: 325 TABLET ORAL at 23:15

## 2019-05-08 RX ADMIN — Medication 30 ML: at 09:11

## 2019-05-08 RX ADMIN — Medication 10 ML: at 23:03

## 2019-05-08 RX ADMIN — HEPARIN SODIUM 5000 UNITS: 5000 INJECTION INTRAVENOUS; SUBCUTANEOUS at 23:03

## 2019-05-08 RX ADMIN — HEPARIN SODIUM 5000 UNITS: 5000 INJECTION INTRAVENOUS; SUBCUTANEOUS at 14:27

## 2019-05-08 RX ADMIN — LOSARTAN POTASSIUM 100 MG: 50 TABLET ORAL at 09:19

## 2019-05-08 RX ADMIN — Medication 10 ML: at 06:24

## 2019-05-08 RX ADMIN — FAMOTIDINE 20 MG: 20 TABLET ORAL at 21:58

## 2019-05-08 RX ADMIN — Medication 10 ML: at 14:27

## 2019-05-08 RX ADMIN — HEPARIN SODIUM 5000 UNITS: 5000 INJECTION INTRAVENOUS; SUBCUTANEOUS at 06:22

## 2019-05-08 RX ADMIN — FAMOTIDINE 20 MG: 20 TABLET ORAL at 09:20

## 2019-05-08 NOTE — PROGRESS NOTES
Neurology Progress Note Patient ID: 
Ralf Khan [id number] 
18 y.o. 
1980 Chief Complaint: 
 
Subjective:  
 
5/7/2019 HISTORY OF PRESENT ILLNESS:  The patient is a 67-year-old -American female with history of opioid dependence, migraine headaches who says that this past Sunday she suddenly developed facial weakness on the left side, had difficulties expressing herself and at the same time she had weakness and numbness in the left upper and lower extremities. She was brought into the emergency department as a stroke alert and she was evaluated by Teleneurology and given her history of migraines and previous neurological deficits with her headaches, this was suspected to be the same. However, her symptoms persisted and intravenous t-PA was considered, but then it was decided not to give it to her. She eventually started to feel somewhat better and decided to leave the emergency department against medical advice. The next day, the patient's symptoms persisted and she decided to come back to the hospital and was readmitted. She continues to experience left-sided weakness and had MRI scan of the brain done earlier today which showed multiple acute infarctions in both cerebral hemispheres. Denies any previous stroke. No chest pain, shortness of breath, palpitations, nausea, or vomiting. She report that she feel much better today. She still has a mild headache. She had question about migraine management. We discussed following up outpatient. She agreed. CTA of the head revealed Thromboembolic occlusion of the posterior division M2 segment of the right 
middle cerebral artery. Luminal irregularity of the right M1 segment with mild to moderate stenosis. Patent proximal and distal internal carotid arteries. Ischemic changes in the right insular cortex and right posterior MCA distribution consistent with the infarct seen on MRI. No superimposed 
hemorrhage. Objective: All records in [facility] reviewed and noted ROS: 
Per HPI All other 12 pt ROS negative Meds: 
Current Facility-Administered Medications Medication Dose Route Frequency  sodium chloride (NS) flush 30 mL  30 mL IntraVENous RAD ONCE  clopidogrel (PLAVIX) tablet 75 mg  75 mg Oral DAILY  losartan (COZAAR) tablet 100 mg  100 mg Oral DAILY  methadone (DOLOPHINE) 5 mg/5 mL oral solution 120 mg  120 mg Oral DAILY  sodium chloride (NS) flush 5-40 mL  5-40 mL IntraVENous Q8H  
 sodium chloride (NS) flush 5-40 mL  5-40 mL IntraVENous PRN  
 acetaminophen (TYLENOL) tablet 650 mg  650 mg Oral Q4H PRN Or  
 acetaminophen (TYLENOL) solution 650 mg  650 mg Per NG tube Q4H PRN Or  
 acetaminophen (TYLENOL) suppository 650 mg  650 mg Rectal Q4H PRN  
 aspirin chewable tablet 81 mg  81 mg Oral DAILY  pravastatin (PRAVACHOL) tablet 40 mg  40 mg Oral QHS  famotidine (PEPCID) tablet 20 mg  20 mg Oral Q12H  
 heparin (porcine) injection 5,000 Units  5,000 Units SubCUTAneous Q8H  
 butalbital-acetaminophen-caffeine (FIORICET, ESGIC) -40 mg per tablet 1 Tab  1 Tab Oral Q4H PRN  
 hydrALAZINE (APRESOLINE) 20 mg/mL injection 10 mg  10 mg IntraVENous Q6H PRN  
 nicotine (NICODERM CQ) 21 mg/24 hr patch 1 Patch  1 Patch TransDERmal Q24H Imaging: CTA of the head and Neck IMPRESSION: 
1. Thromboembolic occlusion of the posterior division M2 segment of the right 
middle cerebral artery. 2.  Luminal irregularity of the right M1 segment with mild to moderate stenosis. 3.  Patent proximal and distal internal carotid arteries. 4.  Ischemic changes in the right insular cortex and right posterior MCA 
distribution consistent with the infarct seen on MRI. No superimposed 
hemorrhage. 
  
Lab Review Recent Results (from the past 24 hour(s)) GLUCOSE, POC Collection Time: 05/07/19 11:36 AM  
Result Value Ref Range Glucose (POC) 103 (H) 65 - 100 mg/dL Performed by Bandar Dey ECHO ADULT COMPLETE Collection Time: 05/07/19 11:55 AM  
Result Value Ref Range Tapse 2.18 (A) 1.5 - 2.0 cm GLUCOSE, POC Collection Time: 05/07/19  5:04 PM  
Result Value Ref Range Glucose (POC) 72 65 - 100 mg/dL Performed by Vicky Fernandez Exam: 
Visit Vitals BP (!) 135/99 Pulse (!) 53 Temp 98 °F (36.7 °C) Resp 15 Ht 5' 4\" (1.626 m) Wt 53.6 kg (118 lb 3.2 oz) SpO2 98% Breastfeeding? No  
BMI 20.29 kg/m² Gen: Well developed CV: RRR Lungs: non labored breathing Abd: non distending Neuro: A&O x 3, no dysarthria or aphasia CN II-XII: PERRL, EOMI, face symmetric, tongue/palate midline Motor: Strength is 4/5 in the left upper extremity and left lower extremity Gait:deferred Assessment:  
 
Patient Active Problem List  
Diagnosis Code  Essential hypertension I10  Alpha thalassemia ([address] [address] D56.0  Chronic hepatitis C without hepatic coma (HCC) B18.2  Nephrolithiasis N20.0  TIA (transient ischemic attack) G45.9  CVA (cerebral vascular accident) ([address] I63.9 Plan: A 41-year-old female with history of hypertension, borderline diabetes, and smoking who is admitted with multiple embolic appearing infarcts in both hemispheres. Echo, unremarkable, No PFO. CTA revealed mild to moderate stenosis. . Will placed her on dual antiplt therapy. Will start Plavix 75 mg today. She can follow up outpatient for headache management. 1.CTA of the head/neck- reviewed 2. TTE-unremarkable 3. Telemetry 4. Stroke labs HgbA1c 5.7, at goal  
 LDL-103,Continue Pravastatin 40mg 5. Will start Plavix 75mg daily 6. Continue ASA 81mg 7. ST/OT/PT eval ordered and will assess patient for rehab 9. Discussed personal risk factors for stroke including: HTN,IVDU 10. Discussed signs and symptoms of stroke and when to alert 911 11. VTE prophylaxis:per primary team   
 
Thank you very much for this consultation.  No further neurologic recommendations at this time. Will sign off but please call with questions.  
Signed: 
Sandy Adan NP 
5/8/2019 
9:03 AM

## 2019-05-08 NOTE — PROGRESS NOTES
Hospitalist Progress Note Hospital summary: The patient is a 62-year-old female with past medical history of opioid dependence, history of migraines, who presents to the hospital with the above-mentioned symptom. The patient reports that yesterday she started having significant headache associated with some left arm and left lower extremity weakness. The patient reports that she came to the ER as a stroke alert. Per chart, the patient was seen by Teleneurology who thought the patient's symptoms were secondary to migraine versus CVA and was requested to be admitted for rule out. The patient had some social issues and had some minors at home, and thus was not able to stay in the hospital.  The patient reports the headache persisted. She continued to feel some left-sided weakness. Although it got much better, she got concerned and decided to come to the hospital.  The patient reports that she has never had these symptoms before with the migraine. She reports that her weakness has improved somewhat, but it still persists. 5/6/2019 Assessment/Plan: 
Acute CVA 
-  Presented with left sided weakness 
- CT head: No acute intracranial process - MRI: Multiple areas of acute infarction, involving the right MCA territory and posterior left frontal lobe. - neurology on board; recs: CTA , echo 
- A1c normal. Lipid panel : LDL elevated 103,  
- echo: normal EF 56-60%, no intracardiac shunt - CTA: 1. Thromboembolic occlusion of the posterior division M2 segment of the right middle cerebral artery. 2.  Luminal irregularity of the right M1 segment with mild to moderate stenosis. 3.  Patent proximal and distal internal carotid arteries. 4.  Ischemic changes in the right insular cortex and right posterior MCA distribution consistent with the infarct seen on MRI. No superimposed hemorrhage. 
 - c/w Aspirin and statin. Added plavix 
- PT/OT recs inpatient rehab - hypercoagulable work up ordered  
- cardiology consulted: recs loop monitor HTN - uncontrolled - c/w losartan 100mg daily 
- will dc metoprolol as patient is bradycardiac. Started on coreg today 
- hydralazine prn Tobacco abuse - nicotine tach Hx opioid dependence and IV drug abuse: on methadone. UDS ordered Code status: Full DVT prophylaxis: heparin Disposition: TBD 
---------------------------------------------- 
 
CC: left side weakness S: Patient is seen and examined at bedside. Lissette present. She still has left side weakness but improving. She wants to go home. Review of Systems: A comprehensive review of systems was negative. O: 
Visit Vitals BP (!) 155/119 (BP 1 Location: Right arm, BP Patient Position: At rest;Sitting) Pulse 63 Temp 98.2 °F (36.8 °C) Resp 20 Ht 5' 4\" (1.626 m) Wt 53.6 kg (118 lb 3.2 oz) SpO2 98% Breastfeeding? No  
BMI 20.29 kg/m² PHYSICAL EXAM: 
Gen: NAD, non-toxic HEENT: anicteric sclerae, normal conjunctiva, oropharynx clear, MM moist 
Neck: supple, trachea midline, no adenopathy Heart: RRR, no MRG, no JVD, no peripheral edema Lungs: CTA b/l, non-labored respirations Abd: soft, NT, ND, BS+, no organomegaly Extr: warm Skin: dry, no rash Neuro: CN II-XII grossly intact, normal speech, moves all extremities Psych: normal mood, appropriate affect No intake or output data in the 24 hours ending 05/08/19 1646 Recent labs & imaging reviewed: 
Recent Results (from the past 24 hour(s)) GLUCOSE, POC Collection Time: 05/07/19  5:04 PM  
Result Value Ref Range Glucose (POC) 72 65 - 100 mg/dL Performed by Fry Cande [place] ADULT COMPLETE Collection Time: 05/08/19  9:12 AM  
Result Value Ref Range Tapse 2.18 (A) 1.5 - 2.0 cm LVIDd 4.18 3.9 - 5.3 cm  
 LVPWd 0.91 (A) 0.6 - 0.9 cm LVIDs 2.32 cm IVSd 0.70 0.6 - 0.9 cm RVIDd 3.12 cm  
 LV Mass .1 67 - 162 g  
 LV Mass AL Index 71.7 43 - 95 g/m2 Left Atrium Major Axis 2.16 cm HOMOCYSTEINE, PLASMA Collection Time: 05/08/19  1:20 PM  
Result Value Ref Range Homocysteine, plasma 10.1 3.7 - 13.9 umol/L Recent Labs 05/07/19 
0230 05/06/19 
1347 WBC 6.6 11.2* HGB 11.9 14.0  
HCT 38.8 43.9  264 Recent Labs 05/05/19 
1717   
K 4.8  
 CO2 28 BUN 10  
CREA 1.01  
GLU 83  
CA 9.8 Recent Labs 05/05/19 
1717 SGOT 32 ALT 24  TBILI 0.4 TP 9.4* ALB 3.7 GLOB 5.7* No results for input(s): INR, PTP, APTT in the last 72 hours. No lab exists for component: INREXT, INREXT No results for input(s): FE, TIBC, PSAT, FERR in the last 72 hours. No results found for: FOL, RBCF No results for input(s): PH, PCO2, PO2 in the last 72 hours. Recent Labs 05/07/19 
0230 05/06/19 
2150 CPK 39 52 TROIQ <0.05 <0.05 Lab Results Component Value Date/Time Cholesterol, total 188 05/07/2019 02:30 AM  
 HDL Cholesterol 37 05/07/2019 02:30 AM  
 LDL, calculated 103 (H) 05/07/2019 02:30 AM  
 Triglyceride 240 (H) 05/07/2019 02:30 AM  
 CHOL/HDL Ratio 5.1 (H) 05/07/2019 02:30 AM  
 
Lab Results Component Value Date/Time Glucose (POC) 72 05/07/2019 05:04 PM  
 Glucose (POC) 103 (H) 05/07/2019 11:36 AM  
 Glucose (POC) 86 05/07/2019 07:46 AM  
 Glucose (POC) 76 05/07/2019 07:32 AM  
 Glucose (POC) 112 (H) 05/06/2019 09:37 PM  
 
Lab Results Component Value Date/Time  Color DARK YELLOW 05/06/2019 04:31 PM  
 Appearance CLOUDY (A) 05/06/2019 04:31 PM  
 Specific gravity 1.028 05/06/2019 04:31 PM  
 Specific gravity 1.020 10/12/2009 11:53 AM  
 pH (UA) 6.0 05/06/2019 04:31 PM  
 Protein NEGATIVE  05/06/2019 04:31 PM  
 Glucose NEGATIVE  05/06/2019 04:31 PM  
 Ketone TRACE (A) 05/06/2019 04:31 PM  
 Bilirubin NEGATIVE  05/06/2019 04:31 PM  
 Urobilinogen 1.0 05/06/2019 04:31 PM  
 Nitrites NEGATIVE  05/06/2019 04:31 PM  
 Leukocyte Esterase TRACE (A) 05/06/2019 04:31 PM  
 Epithelial cells MODERATE (A) 05/06/2019 04:31 PM  
 Bacteria NEGATIVE  05/06/2019 04:31 PM  
 WBC 0-4 05/06/2019 04:31 PM  
 RBC 10-20 05/06/2019 04:31 PM  
 
 
Med list reviewed Current Facility-Administered Medications Medication Dose Route Frequency  clopidogrel (PLAVIX) tablet 75 mg  75 mg Oral DAILY  losartan (COZAAR) tablet 100 mg  100 mg Oral DAILY  methadone (DOLOPHINE) 5 mg/5 mL oral solution 120 mg  120 mg Oral DAILY  sodium chloride (NS) flush 5-40 mL  5-40 mL IntraVENous Q8H  
 sodium chloride (NS) flush 5-40 mL  5-40 mL IntraVENous PRN  
 acetaminophen (TYLENOL) tablet 650 mg  650 mg Oral Q4H PRN Or  
 acetaminophen (TYLENOL) solution 650 mg  650 mg Per NG tube Q4H PRN Or  
 acetaminophen (TYLENOL) suppository 650 mg  650 mg Rectal Q4H PRN  
 aspirin chewable tablet 81 mg  81 mg Oral DAILY  pravastatin (PRAVACHOL) tablet 40 mg  40 mg Oral QHS  famotidine (PEPCID) tablet 20 mg  20 mg Oral Q12H  
 heparin (porcine) injection 5,000 Units  5,000 Units SubCUTAneous Q8H  
 butalbital-acetaminophen-caffeine (FIORICET, ESGIC) -40 mg per tablet 1 Tab  1 Tab Oral Q4H PRN  
 hydrALAZINE (APRESOLINE) 20 mg/mL injection 10 mg  10 mg IntraVENous Q6H PRN  
 nicotine (NICODERM CQ) 21 mg/24 hr patch 1 Patch  1 Patch TransDERmal Q24H Care Plan discussed with:  Patient/Family, Nurse and  Raj Combs MD 
Internal Medicine Date of Service: 5/8/2019

## 2019-05-08 NOTE — PROGRESS NOTES
Problem: Self Care Deficits Care Plan (Adult) Goal: *Acute Goals and Plan of Care (Insert Text) Description Occupational Therapy Goals Initiated 5/8/2019 1. Patient will perform grooming with modified independence in standing within 7 day(s). 2.  Patient will perform lower body dressing with modified independence within 7 day(s). 3.  Patient will perform toilet transfers with modified independence within 7 day(s). 4.  Patient will perform all aspects of toileting with modified independence within 7 day(s). 5.  Patient will participate in upper extremity therapeutic exercise/activities with independence for 5 minutes within 7 day(s). 6.  Patient will utilize energy conservation techniques during functional activities with verbal cues within 7 day(s). 7.  Patient will improve their Fugl Peres score by 5 points in prep for ADLs within 7 days (55/66 LUE). OCCUPATIONAL THERAPY EVALUATION Patient: Sonal Bell (75 y.o. female) Date: 5/8/2019 Primary Diagnosis: TIA (transient ischemic attack) [G45.9] CVA (cerebral vascular accident) ([facility] [address] [I63.9] Precautions:   
 
ASSESSMENT : 
Cleared by RN to see pt for therapy session. Based on the objective data described below, the patient presents with decreased LUE coordination and strength, decreased balance and endurance, impaired functional mobility and elevated BP following admission for CVA. MRI positive for acute R MCA and L frontal infarcts. At baseline pt lives with her fiance, is I with ADLs, IADLs and functional mobility, helps care for her grandson. Pt received supine in bed, pleasant and agreeable to participate. Transferred supine>sit with supervision, good static sitting balance and fair balance while donning socks.  Fugl Peres UE assessment performed and pt scored 55/66, demonstrated full ROM of LUE but with sluggishness to flex shoulder, approx 6 seconds increased time to touch finger to nose with L hand. Also demonstrated decreased L  strength, decreased fine motor skills (poor opposition), and dysmetria. Ambulated short distance in room and performed toilet transfer with CGA-min A, pt unsteady with narrow RIAN. Returned to bed at end of session, pt reported feeling fatigued from activity. Call bell in reach and needs met. At this pt is well below her independent baseline and will benefit from skilled intervention to address the above impairments. Recommend inpatient rehab at discharge pending progress. BP (nurse informed): 
Preactivity: 144/98 Sitting EOB: 165/106 Postactivity: 138/97 Patients rehabilitation potential is considered to be Good Factors which may influence rehabilitation potential include:  
? None noted ? Mental ability/status ? Medical condition ? Home/family situation and support systems ? Safety awareness ? Pain tolerance/management ? Other: PLAN : 
Recommendations and Planned Interventions: 
?                  Self Care Training                  ? Therapeutic Activities ? Functional Mobility Training    ? Cognitive Retraining 
? Therapeutic Exercises           ? Endurance Activities ? Balance Training                   ? Neuromuscular Re-Education ? Visual/Perceptual Training     ? Home Safety Training 
? Patient Education                 ? Family Training/Education ? Other (comment): Frequency/Duration: Patient will be followed by occupational therapy 5 times a week to address goals. Discharge Recommendations: Inpatient Rehab; if pt were to go home would recommend [facility] therapy Further Equipment Recommendations for Discharge: TBD SUBJECTIVE:  
Patient stated The arm just feels heavy.  OBJECTIVE DATA SUMMARY:  
HISTORY:  
Past Medical History:  
Diagnosis Date  ex- IVDU (intravenous drug user)  H/O hysterectomy for benign disease  Hypertension  Major depression, recurrent ([facility] [address]  Methadone maintenance therapy patient ([facility] [address]  Other ill-defined conditions(799.89) ovarian cyst  
 PTSD (post-traumatic stress disorder)  Thalassemia Past Surgical History:  
Procedure Laterality Date  HX GYN    
 HX HEENT    
 t&a  HX ARIANA AND BSO  2009 Endometriosis, Uterine Septum Prior Level of Function/Environment/Context: At baseline pt lives with her fimartinez, is I with ADLs, IADLs and functional mobility, helps care for her grandson. Home Situation Home Environment: Private residence # Steps to Enter: 0 One/Two Story Residence: Split level # of Interior Steps: 21 Height of Each Step (in): 6 inches Interior Rails: None Lift Chair Available: No 
Living Alone: No(lives with fiance and children) Support Systems: Child(neymar), Family member(s), Spouse/Significant Other/Partner, Friends \ neighbors Patient Expects to be Discharged to[de-identified] Private residence Current DME Used/Available at Home: Blood pressure cuff Tub or Shower Type: Shower Hand dominance: Right EXAMINATION OF PERFORMANCE DEFICITS: 
Cognitive/Behavioral Status: 
Neurologic State: Alert Orientation Level: Oriented X4 Cognition: Follows commands Perception: Appears intact Perseveration: No perseveration noted Safety/Judgement: Awareness of environment; Fall prevention Hearing: Auditory Auditory Impairment: None Vision/Perceptual:   
Tracking: Able to track stimulus in all quadrants w/o difficulty Diplopia: No   
Acuity: Able to read clock/calendar on wall without difficulty; Able to read employee name badge without difficulty Range of Motion: 
AROM: Generally decreased, functional 
PROM: Within functional limits Strength: 
Strength: Generally decreased, functional 
  
 Coordination: 
Coordination: Generally decreased, functional 
Fine Motor Skills-Upper: Left Impaired;Right Intact(decreased  and coordination; dysmetria) Gross Motor Skills-Upper: Left Impaired;Right Intact(sluggishness/heaviness LUE, has full ROM) Tone & Sensation: 
Tone: Normal 
Sensation: Intact Balance: 
Sitting: Intact Standing: Impaired; Without support Standing - Static: Poor;Constant support Standing - Dynamic : Poor;Constant support Functional Mobility and Transfers for ADLs: 
Bed Mobility: 
Supine to Sit: Supervision Sit to Supine: Supervision Transfers: 
Sit to Stand: Contact guard assistance Functional Transfers Toilet Transfer : Contact guard assistance;Minimum assistance ADL Assessment: 
Feeding: Setup Oral Facial Hygiene/Grooming: Setup(seated) Bathing: Additional time; Adaptive equipment; Moderate assistance Upper Body Dressing: Minimum assistance Lower Body Dressing: Minimum assistance; Additional time Toileting: Minimum assistance ADL Intervention and task modifications: 
  
 
Grooming Washing Hands: Set-up(seated) Lower Body Dressing Assistance Socks: Contact guard assistance(seated EOB) Leg Crossed Method Used: Yes Position Performed: Seated edge of bed Cognitive Retraining Safety/Judgement: Awareness of environment; Fall prevention Functional Measure:  
Fugl-Peres Assessment of Motor Recovery after Stroke:  
 
Reflex Activity Flexors/Biceps/Fingers: Can be elicited Extensors/Triceps: Can be elicited Reflex Subtotal: 4 Volitional Movement Within Synergies Shoulder Retraction: Full Shoulder Elevation: Full Shoulder Abduction (90 degrees): Full Shoulder External Rotation: Full Elbow Flexion: Full Forearm Supination: Full Shoulder Adduction/Internal Rotation: Full Elbow Extension: Full Forearm Pronation: Full Subtotal: 18 
 
Volitional Movement Mixing Synergies Hand to Lumbar Spine: Full Shoulder Flexion (0-90 degrees): Full Pronation-Supination: Full Subtotal: 6 Volitional Movement With Little or No Synergy Shoulder Abduction (0-90 degrees): Partial 
Shoulder Flexion ( degrees): Partial 
Pronation/Supination: Full Subtotal : 4 Normal Reflex Activity Biceps, Triceps, Finger Flexors: Full Subtotal : 2 Upper Extremity Total  
Upper Extremity Total: 34 Wrist 
Stability at 15 Degree Dorsiflexion: Full Repeated Dorsiflexion/ Volar Flexion: Full Stability at 15 Degree Dorsiflexion: Full Repeated Dorsiflexion/ Volar Flexion: Full Circumduction: Full Wrist Total: 10 Hand Mass Flexion: Full Mass Extension: Full Grasp A: Partial 
Grasp B: Partial 
Grasp C: Partial 
Grasp D: Partial 
Grasp E: Partial 
Hand Total: 9 Coordination/Speed Tremor: Slight Dysmetria: Slight Time: >5s Coordination/Speed Total : 2 Total A-D Total A-D (Motor Function): 55/66 This is a reliable/valid measure of arm function after a neurological event. It has established value to characterize functional status and for measuring spontaneous and therapy-induced recovery; tests proximal and distal motor functions. Fugl-Peres Assessment  UE scores recorded between five and 30 days post neurologic event can be used to predict UE recovery at six months post neurologic event. Severe = 0-21 points Moderately Severe = 22-33 points Moderate = 34-47 points Mild = 48-66 points MK Powers, SIDNEY Bales, & SRIKANTH Norris (1992). Measurement of motor recovery after stroke: Outcome assessment and sample size requirements. Stroke, 23, pp. 5867-8414.  
------------------------------------------------------------------------------------------------------------------------------------------------------------------ MCID: 
Stroke: Hyacinth Monet et al, 2001; n = 171; mean age 79 (6) years; assessed within 16 (12) days of stroke, Acute Stroke) FMA Motor Scores from Admission to Discharge  
? 10 point increase in FMA Upper Extremity = 1.5 change in discharge FIM ? 10 point increase in FMA Lower Extremity = 1.9 change in discharge FIM 
MDC:  
Stroke:  
Kassie Louisaleny et al, 2008, n = 14, mean age = 59.9 (14.6) years, assessed on average 14 (6.5) months post stroke, Chronic Stroke) ? FMA = 5.2 points for the Upper Extremity portion of the assessment Barthel Index: 
 
Bathin Bladder: 10 Bowels: 10 
Groomin Dressin Feeding: 10 Mobility: 5 Stairs: 0 Toilet Use: 5 Transfer (Bed to Chair and Back): 10 Total: 60/100 Percentage of impairment  
0% 1-19% 20-39% 40-59% 60-79% 80-99% 100% Barthel Score 0-100 100 99-80 79-60 59-40 20-39 1-19 
 0 The Barthel ADL Index: Guidelines 1. The index should be used as a record of what a patient does, not as a record of what a patient could do. 2. The main aim is to establish degree of independence from any help, physical or verbal, however minor and for whatever reason. 3. The need for supervision renders the patient not independent. 4. A patient's performance should be established using the best available evidence. Asking the patient, friends/relatives and nurses are the usual sources, but direct observation and common sense are also important. However direct testing is not needed. 5. Usually the patient's performance over the preceding 24-48 hours is important, but occasionally longer periods will be relevant. 6. Middle categories imply that the patient supplies over 50 per cent of the effort. 7. Use of aids to be independent is allowed. Darryle Chant., Barthel, DKaidenW. (1726). Functional evaluation: the Barthel Index. [address] [facility] (14)2. Denita Giraldo kevin CYNDIE Mota, Speedy Salmon., Gio Lindquist., Estefanía, [address] [place] [address] Ave ().  Measuring the change indisability after inpatient rehabilitation; comparison of the responsiveness of the Barthel Index and Functional Amoret Measure. Journal of Neurology, Neurosurgery, and Psychiatry, 66(4), 470-361. EZIO Hicks, AMARILIS Rushing, & Bang Ortega M.A. (2004.) Assessment of post-stroke quality of life in cost-effectiveness studies: The usefulness of the Barthel Index and the EuroQoL-5D. [facility], 13, 275-82 Occupational Therapy Evaluation Charge Determination History Examination Decision-Making LOW Complexity : Brief history review  MEDIUM Complexity : 3-5 performance deficits relating to physical, cognitive , or psychosocial skils that result in activity limitations and / or participation restrictions LOW Complexity : No comorbidities that affect functional and no verbal or physical assistance needed to complete eval tasks Based on the above components, the patient evaluation is determined to be of the following complexity level: LOW Pain: 
Pain Scale 1: Numeric (0 - 10) Pain Intensity 1: 0 Activity Tolerance:  
Good Please refer to the flowsheet for vital signs taken during this treatment. After treatment:  
?  Patient left in no apparent distress sitting up in chair ? Patient left in no apparent distress in bed 
? Call bell left within reach ? Nursing notified ? Caregiver present ? Bed alarm activated COMMUNICATION/EDUCATION:  
The patients plan of care was discussed with: Physical Therapist and Registered Nurse. Patient was educated regarding her deficit(s) of LUE weakness as this relates to her diagnosis of CVA. She demonstrated Good understanding as evidenced by teachback. Patient and/or family was verbally educated on the BE FAST acronym for signs/symptoms of CVA and TIA. BE FAST was written on patient's communication board  for visual education and reinforcement. All questions answered with patient indicating good understanding. ? Home safety education was provided and the patient/caregiver indicated understanding. ?      Patient/family have participated as able and agree with findings and recommendations. ?      Patient is unable to participate in plan of care at this time. This patients plan of care is appropriate for delegation to [facility]. Thank you for this referral. 
Pauly Rosa OT Time Calculation: 23 mins

## 2019-05-08 NOTE — CONSULTS
Cardiology brief note.     Patient: Lynsey Estevez  : 1980  Date: 19    Requesting MD: Rosales Monroe MD    Consult request: Extended cardiac monitoring     History: Admitted with multiple embolic appearing infarcts in B/L hemispheres. Subjective:  Continues with HA       Visit Vitals  BP (!) 149/97 (BP 1 Location: Left arm, BP Patient Position: At rest)   Pulse 73   Temp 98.2 °F (36.8 °C)   Resp 17   Ht 5' 4\" (1.626 m)   Wt 118 lb 3.2 oz (53.6 kg)   SpO2 99%   Breastfeeding? No   BMI 20.29 kg/m²       Physical Exam:  General: AAO x 3, in no distress  Neuro: CN II-XII grossly intact. Assessment:    1. Embolic CVA, B/L hemispheres     Plan:    1. 30 day loop recorder ordered for extended cardiac monitoring. R/o Afib.     Lilia Bird PA-C

## 2019-05-08 NOTE — PROGRESS NOTES
Problem: Neurolinguistics Impaired (Adult) Goal: *Acute Goals and Plan of Care (Insert Text) Description Speech Pathology Initiated 5/8/19 1. Patient will verbalize and demonstrate use of compensatory recall strategies within 7 days 2. Patient will complete delayed recall tasks with 80% accuracy and min cues using strategies within 7 days Outcome: Progressing Towards Goal 
 
SPEECH LANGUAGE PATHOLOGY EVALUATION Patient: Petr Dinh (10 y.o. female) Date: 5/8/2019 Primary Diagnosis: TIA (transient ischemic attack) [G45.9] CVA (cerebral vascular accident) ([facility] [address] [I63.9] Precautions:  
 
ASSESSMENT : 
Based on the objective data described below, the patient presents with mild integrated language deficits in delayed recall. She reports this has been occurring for months now, no worse than prior to admission for CVA. She is not using any strategies (I.e. appointment books, writing notes) at home. 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 Patient will benefit from skilled intervention to address the above impairments. Patient?s rehabilitation potential is considered to be Excellent Factors which may influence rehabilitation potential include:  
? None noted ? Mental ability/status ? Medical condition ? Home/family situation and support systems ? Safety awareness ? Pain tolerance/management ? Other: PLAN : 
Recommendations and Planned Interventions: SLP tx Frequency/Duration: Patient will be followed by speech-language pathology 1 time a week to address goals. Discharge Recommendations: Outpatient SUBJECTIVE:  
Patient stated she has been having a lot of trouble with short term memory for several months now. Reports no issues with swallowing. NAD. OBJECTIVE:  
 
Past Medical History:  
Diagnosis Date  
 ex- IVDU (intravenous drug user) H/O hysterectomy for benign disease Hypertension Major depression, recurrent ([facility] [address] Methadone maintenance therapy patient ([facility] [address] Other ill-defined conditions(799.89) ovarian cyst  
 PTSD (post-traumatic stress disorder) Thalassemia Past Surgical History:  
Procedure Laterality Date HX GYN    
 HX HEENT    
 t&a HX ARIANA AND BSO  2009 Endometriosis, Uterine Septum Prior Level of Function/Home Situation:  
Home Situation Home Environment: Private residence # Steps to Enter: 0 One/Two Story Residence: Split level # of Interior Steps: 21 Height of Each Step (in): 6 inches Interior Rails: None Lift Chair Available: No 
Living Alone: No 
Support Systems: Child(neymar), Family member(s), Friends \ neighbors, Spouse/Significant Other/Partner Patient Expects to be Discharged to[de-identified] Private residence Current DME Used/Available at Home: Blood pressure cuff Mental Status: 
Neurologic State: Alert Orientation Level: Oriented X4 Cognition: Appropriate decision making, Appropriate for age attention/concentration, Follows commands Perception: Appears intact Perseveration: No perseveration noted Safety/Judgement: Awareness of environment, Insight into deficits Motor Speech: 
Oral-Motor Structure/Motor Speech Dysarthric Characteristics: None Intelligibility: No impairment Language Comprehension and Expression: Auditory Comprehension Auditory Impairment: No  
Verbal Expression Primary Mode of Expression: Verbal 
Initiation: No impairment Sentence Formulation: No impairment Conversation: No impairment;Fluent Neuro-Linguistics: 
Orientation: Patient stated name, place, situation month and year without cues. Not oriented to exact date or day of the week. Fund of Knowledge: Patient stated age, birthday, [place], [address] president, season of New Years, and month of [place] Day without cues. Attention: Impaired; Unable to spell WORLD backwards. Spelled CAT backwards Verbal recall: Impaired; Patient recalled 1 of 3 items after 5 minutes with no cues. 1 of 3 recalled with choices. Verbal reasoning (similarities): intact; 100% accuracy Divergent reasoning (absurdities): 100% accuracy Figurative language: 100% accuracy Inferences: 100% accuracy Problem solving: Functional; able to state 3 alternate solutions to common ADL problem solving task with no cues. Verbal organization: intact; patient named 15+ animals in 60 seconds (norm= 14/60 seconds) The NOMS functional outcome measure was used to quantify this patient's level of memory impairment. Based on the NOMS, the patient was determined to be at level 5 for memory function. NOMS Memory: 
Level 1 (CN): Unable to recall any information regardless of cues Level 2 (CM): Constant max cues or external aids to recall personal info Level 3 (CL): Max cues or use external aids for simple routine and personal info. Level 4 (CK): Min cues to recall/use aids for simple info. Max cues to recall/ use aids for complex/novel info and plan/follow through on simple future events Level 5 (CJ): Min cues to recall/use aids for complex/novel info and to plan/follow through complex future events Level 6 (CI): Recalls or uses aids/strategies for complex info & planning future events. Min cues for breakdowns Level 7 (CH): Independent with recall/use of aids/strategies. NILO. (2003). National Outcomes Measurement System (NOMS): Adult Speech-Language Pathology User's Guide. Pain: 
Pain Scale 1: Numeric (0 - 10) Pain Intensity 1: 0 After treatment:  
?              Patient left in no apparent distress sitting up in chair ? Patient left in no apparent distress in bed 
? Call bell left within reach ? Nursing notified ? Caregiver present ? Bed alarm activated COMMUNICATION/EDUCATION:  
 The patient?s plan of care including recommendations and planned interventions was discussed with: Registered Nurse. Patient was educated regarding Her deficit(s) of recall as this relates to Her diagnosis of CVA. She demonstrated Excellent understanding as evidenced by verbalization. ? Patient/family have participated as able in goal setting and plan of care. ?  Patient/family agree to work toward stated goals and plan of care. ?  Patient understands intent and goals of therapy, but is neutral about his/her participation. ? Patient is unable to participate in goal setting and plan of care. Thank you for this referral. 
Armani Woods. Shirley Baez MS, CCC-SLP, BCS-S Time Calculation: 15 mins

## 2019-05-08 NOTE — PROGRESS NOTES
Problem: Falls - Risk of 
Goal: *Absence of Falls Description Document Kwame Sommer Fall Risk and appropriate interventions in the flowsheet. Outcome: Progressing Towards Goal 
  
Problem: TIA/CVA Stroke: 0-24 hours Goal: Activity/Safety Outcome: Progressing Towards Goal 
Goal: Consults, if ordered Outcome: Progressing Towards Goal 
Goal: Diagnostic Test/Procedures Outcome: Progressing Towards Goal 
Goal: Nutrition/Diet Outcome: Progressing Towards Goal 
Goal: Discharge Planning Outcome: Progressing Towards Goal 
Goal: Medications Outcome: Progressing Towards Goal 
Goal: Treatments/Interventions/Procedures Outcome: Progressing Towards Goal 
Goal: Psychosocial 
Outcome: Progressing Towards Goal 
Goal: *Hemodynamically stable Outcome: Progressing Towards Goal 
Goal: *Neurologically stable Description Absence of additional neurological deficits Outcome: Progressing Towards Goal 
Goal: *Verbalizes anxiety and depression are reduced or absent Outcome: Progressing Towards Goal 
Goal: *Absence of Signs of Aspiration on Current Diet Outcome: Progressing Towards Goal 
Goal: *Ability to perform ADLs and demonstrates progressive mobility and function Outcome: Progressing Towards Goal 
Goal: *Stroke education started(Stroke Metric) Outcome: Progressing Towards Goal 
Goal: *Dysphagia screen performed(Stroke Metric) Outcome: Progressing Towards Goal 
Goal: *Rehab consulted(Stroke Metric) Outcome: Progressing Towards Goal

## 2019-05-08 NOTE — PROGRESS NOTES
Problem: TIA/CVA Stroke: 0-24 hours Goal: Discharge Planning Outcome: Progressing Towards Goal 
 Patient to be discharged after cardiology consult. Outpatient cardiac monitoring needed.

## 2019-05-08 NOTE — PROGRESS NOTES
Problem: Mobility Impaired (Adult and Pediatric) Goal: *Acute Goals and Plan of Care (Insert Text) Description Physical Therapy Goals Initiated 5/7/2019 1. Patient will move from supine to sit and sit to supine , scoot up and down and roll side to side in bed with modified independence within 7 day(s). 2.  Patient will transfer from bed to chair and chair to bed with minimal assistance/contact guard assist using the least restrictive device within 7 day(s). 3.  Patient will perform sit to stand with minimal assistance/contact guard assist within 7 day(s). 4.  Patient will ambulate with minimal assistance/contact guard assist for 150 feet with the least restrictive device within 7 day(s). 5.  Patient will ascend/descend 6 stairs with single handrail(s) with minimal assistance/contact guard assist within 7 day(s). Outcome: Progressing Towards Goal 
 PHYSICAL THERAPY TREATMENT Patient: Renee Gardner (28 y.o. female) Date: 5/8/2019 Diagnosis: TIA (transient ischemic attack) [G45.9] CVA (cerebral vascular accident) ([facility] [address] [I63.9] TIA (transient ischemic attack) Precautions:   
Chart, physical therapy assessment, plan of care and goals were reviewed. ASSESSMENT: 
Patient received in bed agreeable to work with therapy. BP sitting EOB pre-activity was 175/105 in LUE and 155/119 in RUE with patient asymptomatic. Patient transferred supine<>sit with SBA, sit<>stand with CGA, and ambulated short distance with Garcia due to unsteadiness, ataxic gait, and narrowed RIAN. Patient scored 7/56 on the House indicating high risk of falls. Session limited due to hypertension. RN made aware of BP. Recommending IP rehab upon discharge as she is far below her independent baseline. Patient expressed preference for returning home with [facility] PT as she has responsibilities at home. Educated on the benefits of IP rehab in maximizing safety and independence with functional mobility. PLOF:  At baseline, patient lives with fiance and children in a split level home. She is independent with ambulation without an AD and independent with all ADLs. Progression toward goals: 
?    Improving appropriately and progressing toward goals ? Improving slowly and progressing toward goals ? Not making progress toward goals and plan of care will be adjusted PLAN: 
Patient continues to benefit from skilled intervention to address the above impairments. Continue treatment per established plan of care. Discharge Recommendations:  Inpatient Rehab If IP rehab is not an option, recommend  PT and assistance with all mobility as patient is a high fall risk Further Equipment Recommendations for Discharge:  TBD SUBJECTIVE:  
Patient stated I might need to go home. If I don't do things at home, they don't get done.  OBJECTIVE DATA SUMMARY:  
Critical Behavior: 
Neurologic State: Alert Orientation Level: Oriented X4 Cognition: Appropriate decision making, Appropriate for age attention/concentration, Follows commands Safety/Judgement: Awareness of environment, Insight into deficits Functional Mobility Training: 
Bed Mobility: 
  
Supine to Sit: Supervision Sit to Supine: Supervision Transfers: 
Sit to Stand: Contact guard assistance Stand to Sit: Contact guard assistance Balance: 
Sitting: Intact Standing: Impaired; Without support Standing - Static: Poor;Constant support Standing - Dynamic : Poor;Constant support Ambulation/Gait Training: 
Distance (ft): 10 Feet (ft) Assistive Device: Gait belt Ambulation - Level of Assistance: Minimal assistance Gait Abnormalities: Decreased step clearance;Shuffling gait;Trunk sway increased Base of Support: Narrowed Speed/Gely: Pace decreased (<100 feet/min); Shuffled Step Length: Left shortened;Right shortened Pain: 
Pain Scale 1: Numeric (0 - 10) Pain Intensity 1: 0 
  
 House Balance Test: 
 
Sitting to Standin Standing Unsupported: 0 Sitting with Back Unsupported: 4 Standing to Sittin Transfers: 1 Standing Unsupported with Eyes Closed: 0 Standing Unsupported with Feet Together: 0 Reach Forward with Outstretched Arm: 0  Object: 0 Turn to Look Over Shoulders: 0 Turn 360 Degrees: 0 Alternate Foot on Step/Stool: 0 Standing Unsupported One Foot in Front: 0 Stand on One Le Total: 7 
 
 
 
56=Maximum possible score;  
0-20=High fall risk 21-40=Moderate fall risk 41-56=Low fall risk Activity Tolerance:  
Poor due to high BP, see above Please refer to the flowsheet for vital signs taken during this treatment. After treatment:  
?    Patient left in no apparent distress sitting up in chair ? Patient left in no apparent distress in bed 
? Call bell left within reach ? Nursing notified ? Caregiver present ? Bed alarm activated COMMUNICATION/COLLABORATION:  
The patients plan of care was discussed with: Occupational Therapist and Registered Nurse Mack Estes PT, DPT Time Calculation: 20 mins

## 2019-05-09 ENCOUNTER — APPOINTMENT (OUTPATIENT)
Dept: NON INVASIVE DIAGNOSTICS | Age: 39
DRG: 045 | End: 2019-05-09
Attending: PHYSICIAN ASSISTANT
Payer: MEDICAID

## 2019-05-09 ENCOUNTER — HOME HEALTH ADMISSION (OUTPATIENT)
Dept: HOME HEALTH SERVICES | Facility: HOME HEALTH | Age: 39
End: 2019-05-09
Payer: MEDICAID

## 2019-05-09 VITALS
TEMPERATURE: 97.4 F | RESPIRATION RATE: 18 BRPM | SYSTOLIC BLOOD PRESSURE: 151 MMHG | HEART RATE: 70 BPM | WEIGHT: 118.2 LBS | HEIGHT: 64 IN | DIASTOLIC BLOOD PRESSURE: 100 MMHG | BODY MASS INDEX: 20.18 KG/M2 | OXYGEN SATURATION: 99 %

## 2019-05-09 PROCEDURE — 74011250637 HC RX REV CODE- 250/637: Performed by: FAMILY MEDICINE

## 2019-05-09 PROCEDURE — 74011250637 HC RX REV CODE- 250/637: Performed by: NURSE PRACTITIONER

## 2019-05-09 PROCEDURE — 74011250637 HC RX REV CODE- 250/637: Performed by: INTERNAL MEDICINE

## 2019-05-09 PROCEDURE — 74011250636 HC RX REV CODE- 250/636: Performed by: FAMILY MEDICINE

## 2019-05-09 PROCEDURE — 97116 GAIT TRAINING THERAPY: CPT

## 2019-05-09 PROCEDURE — 93270 REMOTE 30 DAY ECG REV/REPORT: CPT

## 2019-05-09 RX ORDER — CARVEDILOL 12.5 MG/1
12.5 TABLET ORAL 2 TIMES DAILY WITH MEALS
Qty: 60 TAB | Refills: 0 | Status: SHIPPED | OUTPATIENT
Start: 2019-05-09 | End: 2019-06-12 | Stop reason: SDUPTHER

## 2019-05-09 RX ADMIN — CLOPIDOGREL BISULFATE 75 MG: 75 TABLET ORAL at 08:15

## 2019-05-09 RX ADMIN — Medication 10 ML: at 06:07

## 2019-05-09 RX ADMIN — HEPARIN SODIUM 5000 UNITS: 5000 INJECTION INTRAVENOUS; SUBCUTANEOUS at 06:07

## 2019-05-09 RX ADMIN — CARVEDILOL 12.5 MG: 12.5 TABLET, FILM COATED ORAL at 08:15

## 2019-05-09 RX ADMIN — METHADONE HYDROCHLORIDE 120 MG: 5 SOLUTION ORAL at 09:29

## 2019-05-09 RX ADMIN — ASPIRIN 81 MG 81 MG: 81 TABLET ORAL at 08:15

## 2019-05-09 RX ADMIN — LOSARTAN POTASSIUM 100 MG: 50 TABLET ORAL at 08:15

## 2019-05-09 RX ADMIN — FAMOTIDINE 20 MG: 20 TABLET ORAL at 08:15

## 2019-05-09 NOTE — PROGRESS NOTES
Stroke Education documented in Patient Education: YES Core Measures Documented in Connect Care: 
Risk Factors: YES Warning signs of stroke: YES When to Activate 911: YES Medication Education for Risk Factors: YES Smoking cessation if applicable: YES Written Education Given:  [facility] Discharge NIH Completed: YES Score: 1 BRAINS: YES Follow Up Appointment Made: NO 
Date/Time if applicable:   
Stroke Education documented in Patient Education: YES Core Measures Documented in Connect Care: 
Risk Factors: YES Warning signs of stroke: YES When to Activate 911: YES Medication Education for Risk Factors: YES Smoking cessation if applicable: YES Written Education Given:  [facility] Discharge NIH Completed: YES Score: 1 BRAINS: YES Follow Up Appointment Made:  
Date/Time if applicable: Bedside RN performed patient education and medication education. Discharge concerns initiated and discussed with patient, including clarification on \"who\" assists the patient at their home and instructions for when the home going patient should call their provider after discharge. Opportunity for questions and clarification was provided. Patient receptive to education: YES Patient stated: \"I'm ready to go\" Barriers to Education: None Diagnosis Education given:  YES Length of stay: 2 Expected Day of Discharge: Today Ask if they have \"Help at Home\" & add to white board? YES Education Day #: 1 Medication Education Given:  YES 
M in the box Medication name: Aspirin Pt aware of HCAHPS survey: YES

## 2019-05-09 NOTE — DISCHARGE INSTRUCTIONS
Learning About FAST: Stroke Warning Signs  What is FAST? FAST is a simple way to remember the main symptoms of stroke. Recognizing these symptoms helps you know when to call for medical help. FAST stands for:  · Face drooping. · Arm weakness. · Speech difficulty. · Time to call 911. What happens when you have a stroke? A stroke occurs when a blood vessel to the brain bursts or is blocked by a blood clot. Within minutes, the nerve cells in that part of the brain die. As a result, the part of the body controlled by those cells cannot work properly. The effects of a stroke may range from mild to severe. They may get better, or they may last the rest of your life. A stroke can affect vision, speech, behavior, thought processes, and your ability to move. It can cause symptoms that may include:  · Sudden numbness, tingling, weakness, or loss of movement in your face, arm, or leg, especially on only one side of your body. · Sudden vision changes. · Sudden trouble speaking. · Sudden confusion or trouble understanding simple statements. · Sudden problems with walking or balance. · A sudden, severe headache that is different from past headaches. Why is it important to get help FAST? Quick treatment may save your life. And it may reduce the damage in your brain so that you have fewer problems after the stroke. When you know the FAST symptoms, you will know when it's important to call for medical help. Where can you learn more? Go to http://darlin-breann.info/. Enter E521 in the search box to learn more about \"Learning About FAST: Stroke Warning Signs. \"  Current as of: September 26, 2018  Content Version: 11.9  © 8062-8756 [employer]. Care instructions adapted under license by [employer] (which disclaims liability or warranty for this information).  If you have questions about a medical condition or this instruction, always ask your healthcare professional. [place], [facility] disclaims any warranty or liability for your use of this information.           Learning About How to Prevent a Stroke  What is a stroke? A stroke occurs when a blood vessel in the brain bursts or is blocked by a blood clot. Without blood and the oxygen it carries, part of the brain starts to die. The part of the body controlled by the damaged area of the brain can't work properly. But there are many things you can do to help lower your stroke risk. What increases your risk for stroke? A risk factor is anything that makes you more likely to have a particular health problem. Risk factors for stroke that you can treat or change include:  · Health problems like high blood pressure, atrial fibrillation, diabetes, and high cholesterol. · Smoking. · Heavy use of alcohol. · Being overweight. · Not getting enough physical activity. Risk factors you can't change include:  · Age. The risk of stroke goes up as you get older. · Race.  Americans, Native Americans, and [place] Natives have a higher risk than those of other races. · Being female. Women have a higher risk of stroke than men. · Family history of stroke. Your doctor can help you know your risk. Then you and your can doctor talk about whether you need to lower it. What can you do to prevent a stroke? · Treat any health problems you have that raise your risk. · Adopt a heart-healthy lifestyle:  ? Don't smoke. If you need help quitting, talk to your doctor about stop-smoking programs and medicines. These can increase your chances of quitting for good. ? Limit alcohol to 2 drinks a day for men and 1 drink a day for women. ? Stay at a healthy weight. Lose weight if you need to.  ? If your doctor recommends it, get more exercise. Walking is a good choice. Bit by bit, increase the amount you walk every day. Try for at least 30 minutes on most days of the week. ? Eat heart-healthy foods.  These include fruits, vegetables, high-fiber foods, and fish. Choose foods that are low in sodium, saturated fat, and trans fat. · Decide with your doctor whether you will also take medicines to help lower your risk. For example, you and your doctor may decide you will take a medicine that prevents blood clots. What are the symptoms of a stroke? The brain damage from a stroke starts within minutes. That's why it's so important to know the symptoms of stroke and to act fast. Quick treatment can help limit damage to the brain so that you have fewer problems after the stroke. FAST is a simple way to remember the main symptoms of stroke. Recognizing these symptoms helps you know when to call for medical help. FAST stands for:  · Face drooping. · Arm weakness. · Speech difficulty. · Time to call 911. Follow-up care is a key part of your treatment and safety. Be sure to make and go to all appointments, and call your doctor if you are having problems. It's also a good idea to know your test results and keep a list of the medicines you take. Where can you learn more? Go to http://darlin-breann.info/. Enter G757 in the search box to learn more about \"Learning About How to Prevent a Stroke. \"  Current as of: September 26, 2018  Content Version: 11.9  © 0266-1873 [employer]. Care instructions adapted under license by [employer] (which disclaims liability or warranty for this information). If you have questions about a medical condition or this instruction, always ask your healthcare professional. [place] [address] any warranty or liability for your use of this information.        Please bring this form with you to show your primary care provider at your follow-up appointment.     Primary care provider:  Dr. Gildrado Pepper NP    Discharging provider:  Lawrence Malloy MD    You have been admitted to the hospital with the following diagnoses:  · TIA (transient ischemic attack) [G45.9]  · CVA (cerebral vascular accident) [facility]) [I63.9]    FOLLOW-UP CARE RECOMMENDATIONS:    APPOINTMENTS:  · Follow-up with primary care provider, Dr. Marijean Soulier, NP  -  Please call [phone] shortly after discharge to set up an appointment to be seen in  1 week   · Neurology and Cardiology in 2-3 weeks    FOLLOW-UP TESTS recommended: none    PENDING TEST RESULTS:  At the time of your discharge the following test results are still pending: hypercoagulable work up  Please make sure you review these results with your outpatient follow-up provider(s). SYMPTOMS to watch for: chest pain, shortness of breath, fever, chills, nausea, vomiting, diarrhea, change in mentation, falling, weakness, bleeding. DIET/what to eat:  Low fat, Low cholesterol    ACTIVITY:  Activity as tolerated      What to do if new or unexpected symptoms occur? If you experience any of the above symptoms (or should other concerns or questions arise after discharge) please call your primary care physician. Return to the emergency room if you cannot get hold of your doctor. · It is very important that you keep your follow-up appointment(s). · Please bring discharge papers, medication list (and/or medication bottles) to your follow-up appointments for review by your outpatient provider(s). · Please check the list of medications and be sure it includes every medication (even non-prescription medications) that your provider wants you to take. · It is important that you take the medication exactly as they are prescribed. · Keep your medication in the bottles provided by the pharmacist and keep a list of the medication names, dosages, and times to be taken in your wallet. · Do not take other medications without consulting your doctor.    · If you have any questions about your medications or other instructions, please talk to your nurse or care provider before you leave the hospital.    I understand that if any problems occur once I am at home I am to contact my physician. These instructions were explained to me and I had the opportunity to ask questions.

## 2019-05-09 NOTE — PROGRESS NOTES
Pharmacist Discharge Medication Reconciliation Discharging Provider: Dr. Joel Tuttle Significant PMH:  
Past Medical History:  
Diagnosis Date  
 ex- IVDU (intravenous drug user) H/O hysterectomy for benign disease Hypertension Major depression, recurrent ([facility] Methadone maintenance therapy patient ([facility] Other ill-defined conditions(799.89) ovarian cyst  
 PTSD (post-traumatic stress disorder) Thalassemia Chief Complaint for this Admission: Chief Complaint Patient presents with Headache Allergies: Amlodipine; Hydrochlorothiazide; and Nsaids (non-steroidal anti-inflammatory drug) Discharge Medications:  
Current Discharge Medication List  
  
 
START taking these medications Details  
carvedilol (COREG) 12.5 mg tablet Take 1 Tab by mouth two (2) times daily (with meals). Qty: 60 Tab, Refills: 0  
  
aspirin 81 mg chewable tablet Take 1 Tab by mouth daily. Qty: 30 Tab, Refills: 0  
  
clopidogrel (PLAVIX) 75 mg tab Take 1 Tab by mouth daily. Qty: 30 Tab, Refills: 0  
  
nicotine (NICODERM CQ) 21 mg/24 hr 1 Patch by TransDERmal route every twenty-four (24) hours for 30 days. Qty: 30 Patch, Refills: 0  
  
pravastatin (PRAVACHOL) 40 mg tablet Take 1 Tab by mouth nightly. Qty: 30 Tab, Refills: 0 CONTINUE these medications which have CHANGED Details  
losartan (COZAAR) 100 mg tablet Take 1 Tab by mouth daily. Qty: 30 Tab, Refills: 0 CONTINUE these medications which have NOT CHANGED Details  
methadone (DOLOPHINE) 10 mg tablet Take 8 Tabs by mouth daily. Max Daily Amount: 80 mg. 
Qty: 1 Tab, Refills: 0 Associated Diagnoses: Methadone maintenance therapy patient ([facility] STOP taking these medications  
  
 metoprolol succinate (TOPROL-XL) 50 mg XL tablet Comments:  
Reason for Stopping:   
   
  
 
 
The patient's chart, MAR and AVS were reviewed by Alma Rosa Wyatt, PHARMD.

## 2019-05-09 NOTE — PROGRESS NOTES
Problem: Mobility Impaired (Adult and Pediatric) Goal: *Acute Goals and Plan of Care (Insert Text) Description Physical Therapy Goals Initiated 5/7/2019 1. Patient will move from supine to sit and sit to supine , scoot up and down and roll side to side in bed with modified independence within 7 day(s). 2.  Patient will transfer from bed to chair and chair to bed with minimal assistance/contact guard assist using the least restrictive device within 7 day(s). 3.  Patient will perform sit to stand with minimal assistance/contact guard assist within 7 day(s). 4.  Patient will ambulate with minimal assistance/contact guard assist for 150 feet with the least restrictive device within 7 day(s). 5.  Patient will ascend/descend 6 stairs with single handrail(s) with minimal assistance/contact guard assist within 7 day(s). Outcome: Progressing Towards Goal 
 PHYSICAL THERAPY TREATMENT Patient: Ismael Ugarte (42 y.o. female) Date: 5/9/2019 Diagnosis: TIA (transient ischemic attack) [G45.9] CVA (cerebral vascular accident) ([facility] [I63.9] TIA (transient ischemic attack) Precautions:   
Chart, physical therapy assessment, plan of care and goals were reviewed. ASSESSMENT: 
Patient is supervision level for supine<>sitting, CGA for sit<>stand transfers, and ambulates 15 ft with Garcia due to unsteadiness and ataxic gait. Patient with narrowed RIAN, increased trunk sway, and noted dragging LLE rather than picking it up and stepping. Patient able to take several steps with appropriate hip and knee flexion with UE support on wall rail and additional time. Performed LLE standing therex. Continued to discuss benefits of IP rehab with patient. Recommending IP rehab upon discharge as she is far below her independent baseline. Patient expressed preference for returning home with [facility] PT as she has responsibilities at home.   Educated on the benefits of IP rehab in maximizing safety and independence with functional mobility. PLOF:  At baseline, patient lives with fiance and children in a split level home. She is independent with ambulation without an AD and independent with all ADLs. Progression toward goals: 
?    Improving appropriately and progressing toward goals ? Improving slowly and progressing toward goals ? Not making progress toward goals and plan of care will be adjusted PLAN: 
Patient continues to benefit from skilled intervention to address the above impairments. Continue treatment per established plan of care. Discharge Recommendations:  Inpatient Rehab. If IP rehab is not an option recommending HH PT Further Equipment Recommendations for Discharge:  TBD SUBJECTIVE:  
Patient stated ? Maybe I'll go home for a trial and see how it goes. If I can't do the things I need to, then I'll go to rehab.? OBJECTIVE DATA SUMMARY:  
Critical Behavior: 
Neurologic State: Alert Orientation Level: Oriented X4 Cognition: Appropriate for age attention/concentration, Follows commands Safety/Judgement: Awareness of environment, Fall prevention Functional Mobility Training: 
Bed Mobility: 
  
Supine to Sit: Supervision Sit to Supine: Supervision Transfers: 
Sit to Stand: Contact guard assistance Stand to Sit: Contact guard assistance Balance: 
Sitting: Intact Standing: Impaired; With support Standing - Static: Poor;Constant support Standing - Dynamic : Poor;Constant support Ambulation/Gait Training: 
Distance (ft): 15 Feet (ft) Assistive Device: Gait belt(R wall rail) Ambulation - Level of Assistance: Minimal assistance Gait Abnormalities: Decreased step clearance;Shuffling gait;Trunk sway increased; Ataxic Base of Support: Narrowed Speed/Gely: Pace decreased (<100 feet/min); Shuffled Step Length: Left shortened;Right shortened Pain: 
Pain Scale 1: Numeric (0 - 10) Pain Intensity 1: 0 
  
  
  
  
 Activity Tolerance:  
NAD Please refer to the flowsheet for vital signs taken during this treatment. After treatment:  
?    Patient left in no apparent distress sitting up in chair ? Patient left in no apparent distress in bed 
? Call bell left within reach ? Nursing notified ? Caregiver present ? Bed alarm activated COMMUNICATION/COLLABORATION:  
The patient?s plan of care was discussed with: Occupational Therapist, Registered Nurse and Physician 
 
Jaymie Sanchez PT, DPT Time Calculation: 13 mins

## 2019-05-09 NOTE — DISCHARGE SUMMARY
Discharge Summary     Patient:  Fay Aguiar       MRN: [id number]       YOB: 1980       Age: 41 y.o. Date of admission:  5/6/2019    Date of discharge:  5/9/2019    Primary care provider: Dr. Pelon Johnson, NP    Admitting provider:  Valorie Burns MD    Discharging provider:  [address] [phone]. If unavailable, call [phone] and ask the  to page the triage hospitalist.    Consultations  · IP CONSULT TO HOSPITALIST  · IP CONSULT TO NEUROLOGY  · IP CONSULT TO CARDIOLOGY    Procedures  · * No surgery found *    Discharge destination: home with home health. The patient is stable for discharge. Admission diagnosis  · TIA (transient ischemic attack) [G45.9]  · CVA (cerebral vascular accident) ([facility] [I63.9]    Current Discharge Medication List      START taking these medications    Details   carvedilol (COREG) 12.5 mg tablet Take 1 Tab by mouth two (2) times daily (with meals). Qty: 60 Tab, Refills: 0      aspirin 81 mg chewable tablet Take 1 Tab by mouth daily. Qty: 30 Tab, Refills: 0      clopidogrel (PLAVIX) 75 mg tab Take 1 Tab by mouth daily. Qty: 30 Tab, Refills: 0      nicotine (NICODERM CQ) 21 mg/24 hr 1 Patch by TransDERmal route every twenty-four (24) hours for 30 days. Qty: 30 Patch, Refills: 0      pravastatin (PRAVACHOL) 40 mg tablet Take 1 Tab by mouth nightly. Qty: 30 Tab, Refills: 0         CONTINUE these medications which have CHANGED    Details   losartan (COZAAR) 100 mg tablet Take 1 Tab by mouth daily. Qty: 30 Tab, Refills: 0         CONTINUE these medications which have NOT CHANGED    Details   methadone (DOLOPHINE) 10 mg tablet Take 8 Tabs by mouth daily.  Max Daily Amount: 80 mg.  Qty: 1 Tab, Refills: 0    Associated Diagnoses: Methadone maintenance therapy patient ([facility]         STOP taking these medications       metoprolol succinate (TOPROL-XL) 50 mg XL tablet Comments:   Reason for Stopping: Follow-up Information     Follow up With Specialties Details Why Contact Info    Meera Melendrez NP Nurse Practitioner In 1 week  [address] [id number]      Jagruti Purcell MD Neurology In 3 weeks  [address] [facility]      Zulma Gottron, PA-C Physician Assistant In 2 weeks  [address]  [address] [facility] [address] [facility] [address]  [phone]            Final discharge diagnoses and brief hospital course  The patient is a 52-year-old female with past medical history of opioid dependence, history of migraines, who presents to the hospital with the above-mentioned symptom.  The patient reports that yesterday she started having significant headache associated with some left arm and left lower extremity weakness.  The patient reports that she came to the ER as a stroke alert.  Per chart, the patient was seen by Teleneurology who thought the patient's symptoms were secondary to migraine versus CVA and was requested to be admitted for rule out.  The patient had some social issues and had some minors at home, and thus was not able to stay in the hospital.  The patient reports the headache persisted.  She continued to feel some left-sided weakness.  Although it got much better, she got concerned and decided to come to the hospital.  The patient reports that she has never had these symptoms before with the migraine.  She reports that her weakness has improved somewhat, but it still persists. 5/6/2019    Acute CVA  -  Presented with left sided weakness  - CT head: No acute intracranial process  - MRI: Multiple areas of acute infarction, involving the right MCA territory and posterior left frontal lobe. - neurology on board; recs: CTA , echo  - A1c normal. Lipid panel : LDL elevated 103,   - echo: normal EF 56-60%, no intracardiac shunt  - CTA:   1. Thromboembolic occlusion of the posterior division M2 segment of the right middle cerebral artery. 2.  Luminal irregularity of the right M1 segment with mild to moderate stenosis. 3.  Patent proximal and distal internal carotid arteries. 4.  Ischemic changes in the right insular cortex and right posterior MCA distribution consistent with the infarct seen on MRI. No superimposed hemorrhage.   - c/w Aspirin and statin. Added plavix  - PT/OT recs inpatient rehab  - hypercoagulable work up pending.   - cardiology consulted: recs loop monitor    HTN - still elevated  - c/w losartan 100mg daily  - will dc metoprolol as patient is bradycardiac. Started on coreg 5/8  - pt feels anxious and states her BP is elevated because of that. She checks her BP at home and assured that she will monitor her BP   - hydralazine prn     Tobacco abuse - nicotine patch  Hx opioid dependence and IV drug abuse: on methadone. Hx endometriosis s/p hysterectomy  Hx Hep C treated    Patient states she has an appointment with Rheumatology next month for evaluation of RA.      Recommendations:  PCP f/u in 1 week  Advised to check BP at home and log it  Neurology and cardiology f/u in 2 weeks        Physical examination at discharge  Visit Vitals  BP (!) 151/100 (BP 1 Location: Left arm, BP Patient Position: At rest)   Pulse 70   Temp 97.4 °F (36.3 °C)   Resp 18   Ht 5' 4\" (1.626 m)   Wt 53.6 kg (118 lb 3.2 oz)   SpO2 99%   Breastfeeding?  No   BMI 20.29 kg/m²     Gen: NAD, non-toxic  HEENT: anicteric sclerae, normal conjunctiva, oropharynx clear, MM moist  Neck: supple, trachea midline, no adenopathy  Heart: RRR, no MRG, no JVD, no peripheral edema  Lungs: CTA b/l, non-labored respirations  Abd: soft, NT, ND, BS+, no organomegaly  Extr: warm  Skin: dry, no rash  Neuro: CN II-XII grossly intact, normal speech, moves all extremities  Psych: anxious        Pertinent imaging studies:    - MRI: Multiple areas of acute infarction, involving the right MCA territory and posterior left frontal lobe. - CTA:   1. Thromboembolic occlusion of the posterior division M2 segment of the right middle cerebral artery. 2.  Luminal irregularity of the right M1 segment with mild to moderate stenosis. 3.  Patent proximal and distal internal carotid arteries. 4.  Ischemic changes in the right insular cortex and right posterior MCA distribution consistent with the infarct seen on MRI. No superimposed hemorrhage. Recent Labs     05/07/19  0230 05/06/19  1347   WBC 6.6 11.2*   HGB 11.9 14.0   HCT 38.8 43.9    264     No results for input(s): NA, K, CL, CO2, BUN, CREA, GLU, CA, MG, PHOS, URICA in the last 72 hours. No results for input(s): SGOT, GPT, AP, TBIL, TP, ALB, GLOB, GGT, AML, LPSE in the last 72 hours. No lab exists for component: AMYP, HLPSE  No results for input(s): INR, PTP, APTT in the last 72 hours. No lab exists for component: INREXT   No results for input(s): FE, TIBC, PSAT, FERR in the last 72 hours. No results for input(s): PH, PCO2, PO2 in the last 72 hours. Recent Labs     05/07/19  0230 05/06/19  2150   CPK 39 52     No components found for: GLPOC    Chronic Diagnoses:    Problem List as of 5/9/2019 Date Reviewed: 3/23/2019          Codes Class Noted - Resolved    CVA (cerebral vascular accident) [facility]) ICD-10-CM: I63.9  ICD-9-CM: 434.91  5/7/2019 - Present        * (Principal) TIA (transient ischemic attack) ICD-10-CM: G45.9  ICD-9-CM: 435.9  5/6/2019 - Present        Chronic hepatitis C without hepatic coma ([facility] ICD-10-CM: B18.2  ICD-9-CM: 070.54  2/22/2018 - Present        Nephrolithiasis ICD-10-CM: N20.0  ICD-9-CM: 592.0  2/22/2018 - Present        Essential hypertension ICD-10-CM: I10  ICD-9-CM: 401.9  11/3/2017 - Present        Alpha thalassemia ([facility] ICD-10-CM: D56.0  ICD-9-CM: 282.43  11/3/2017 - Present              Time spent on discharge related activities today greater than 30 minutes.       Signed:  Temi Rizo MD Hospitalist, Internal Medicine      Cc: Melonie Melendrez, NP

## 2019-05-09 NOTE — ROUTINE PROCESS
Bedside and Verbal shift change report given to Dede Buenrostro RN (oncoming nurse) by Troy Dinh RN (offgoing nurse). Report included the following information SBAR, Kardex, MAR, Recent Results and Cardiac Rhythm SB - NSR.

## 2019-05-09 NOTE — PROGRESS NOTES
Hospital follow-up PCP transitional care appointment has been scheduled with Dr. Mable Cushing, NP for Thursday, 5/16/19 at 2:00 p.m. Pending patient discharge.   Mich Winchester, Care Management Specialist.

## 2019-05-09 NOTE — PROGRESS NOTES
Problem: Falls - Risk of 
Goal: *Absence of Falls Description Document Aristidespatrice King Fall Risk and appropriate interventions in the flowsheet. Outcome: Progressing Towards Goal 
  
Problem: TIA/CVA Stroke: 0-24 hours Goal: Consults, if ordered Outcome: Progressing Towards Goal 
 Neurology consult complete

## 2019-05-09 NOTE — PROGRESS NOTES
Patient seen at the bedside. Cardiac monitor on. Patient is adamant about going home, she is agreeable to home health. Referral sent  Via [facility]clink for SN/PT/OT. Possible discharge today per attending. Shayne Ruffin RN CRM Ext [phone] Referral accepted by [facility] and will begin 5/11/19. Contact information added to AVS.

## 2019-05-10 LAB
APCR PPP: 2.9 RATIO (ref 2.2–3.5)
AT III PPP CHRO-ACNC: 105 % (ref 75–135)
PROT C PPP-ACNC: 167 % (ref 73–180)
PROT S ACT/NOR PPP: 143 % (ref 57–157)
PROT S PPP-ACNC: 113 % (ref 60–150)

## 2019-05-11 ENCOUNTER — HOME CARE VISIT (OUTPATIENT)
Dept: SCHEDULING | Facility: HOME HEALTH | Age: 39
End: 2019-05-11
Payer: MEDICAID

## 2019-05-11 VITALS
OXYGEN SATURATION: 95 % | SYSTOLIC BLOOD PRESSURE: 122 MMHG | BODY MASS INDEX: 19.66 KG/M2 | HEART RATE: 76 BPM | TEMPERATURE: 98.3 F | WEIGHT: 118 LBS | HEIGHT: 65 IN | DIASTOLIC BLOOD PRESSURE: 64 MMHG | RESPIRATION RATE: 18 BRPM

## 2019-05-11 VITALS
OXYGEN SATURATION: 99 % | SYSTOLIC BLOOD PRESSURE: 120 MMHG | HEART RATE: 64 BPM | DIASTOLIC BLOOD PRESSURE: 66 MMHG | TEMPERATURE: 97.4 F | RESPIRATION RATE: 16 BRPM

## 2019-05-11 PROCEDURE — 400013 HH SOC

## 2019-05-11 PROCEDURE — G0151 HHCP-SERV OF PT,EA 15 MIN: HCPCS

## 2019-05-11 PROCEDURE — G0299 HHS/HOSPICE OF RN EA 15 MIN: HCPCS

## 2019-05-12 LAB
CARDIOLIPIN IGA SER IA-ACNC: <9 APL U/ML (ref 0–11)
CARDIOLIPIN IGG SER IA-ACNC: <9 GPL U/ML (ref 0–14)
CARDIOLIPIN IGM SER IA-ACNC: 10 MPL U/ML (ref 0–12)

## 2019-05-13 ENCOUNTER — HOME CARE VISIT (OUTPATIENT)
Dept: SCHEDULING | Facility: HOME HEALTH | Age: 39
End: 2019-05-13
Payer: MEDICAID

## 2019-05-13 VITALS
HEART RATE: 56 BPM | DIASTOLIC BLOOD PRESSURE: 64 MMHG | SYSTOLIC BLOOD PRESSURE: 122 MMHG | TEMPERATURE: 98.6 F | OXYGEN SATURATION: 96 % | RESPIRATION RATE: 14 BRPM

## 2019-05-13 VITALS
OXYGEN SATURATION: 98 % | TEMPERATURE: 98.5 F | DIASTOLIC BLOOD PRESSURE: 60 MMHG | RESPIRATION RATE: 12 BRPM | SYSTOLIC BLOOD PRESSURE: 110 MMHG | HEART RATE: 66 BPM

## 2019-05-13 LAB
B2 GLYCOPROT1 IGG SER-ACNC: <9 GPI IGG UNITS (ref 0–20)
B2 GLYCOPROT1 IGM SER-ACNC: <9 GPI IGM UNITS (ref 0–32)
F5 GENE MUT ANL BLD/T: NORMAL

## 2019-05-13 PROCEDURE — G0299 HHS/HOSPICE OF RN EA 15 MIN: HCPCS

## 2019-05-13 PROCEDURE — G0151 HHCP-SERV OF PT,EA 15 MIN: HCPCS

## 2019-05-15 ENCOUNTER — HOME CARE VISIT (OUTPATIENT)
Dept: SCHEDULING | Facility: HOME HEALTH | Age: 39
End: 2019-05-15
Payer: MEDICAID

## 2019-05-15 VITALS
DIASTOLIC BLOOD PRESSURE: 82 MMHG | HEART RATE: 88 BPM | TEMPERATURE: 98.2 F | OXYGEN SATURATION: 93 % | RESPIRATION RATE: 18 BRPM | SYSTOLIC BLOOD PRESSURE: 112 MMHG

## 2019-05-15 PROCEDURE — G0151 HHCP-SERV OF PT,EA 15 MIN: HCPCS

## 2019-05-15 PROCEDURE — G0300 HHS/HOSPICE OF LPN EA 15 MIN: HCPCS

## 2019-05-16 ENCOUNTER — OFFICE VISIT (OUTPATIENT)
Dept: FAMILY MEDICINE CLINIC | Age: 39
End: 2019-05-16

## 2019-05-16 VITALS
BODY MASS INDEX: 20.83 KG/M2 | DIASTOLIC BLOOD PRESSURE: 67 MMHG | TEMPERATURE: 97.9 F | SYSTOLIC BLOOD PRESSURE: 118 MMHG | HEIGHT: 65 IN | RESPIRATION RATE: 16 BRPM | OXYGEN SATURATION: 100 % | HEART RATE: 54 BPM | WEIGHT: 125 LBS

## 2019-05-16 VITALS
OXYGEN SATURATION: 98 % | RESPIRATION RATE: 18 BRPM | SYSTOLIC BLOOD PRESSURE: 112 MMHG | HEART RATE: 73 BPM | TEMPERATURE: 98.2 F | DIASTOLIC BLOOD PRESSURE: 64 MMHG

## 2019-05-16 DIAGNOSIS — I63.89 CEREBROVASCULAR ACCIDENT (CVA) DUE TO OTHER MECHANISM (HCC): Primary | ICD-10-CM

## 2019-05-16 DIAGNOSIS — F11.21 OPIOID DEPENDENCE IN REMISSION (HCC): ICD-10-CM

## 2019-05-16 DIAGNOSIS — I10 ESSENTIAL HYPERTENSION: ICD-10-CM

## 2019-05-16 DIAGNOSIS — Z72.0 TOBACCO ABUSE: ICD-10-CM

## 2019-05-16 NOTE — PROGRESS NOTES
Chief Complaint   Patient presents with   [address] 5/6 for Transient Ischemic Attack     1. Have you been to the ER, urgent care clinic since your last visit? Hospitalized since your last visit? Patient was admitted on 5/6 at [facility] for TIA. 2. Have you seen or consulted any other health care providers outside of the [address] [place] [address] since your last visit? Include any pap smears or colon screening.  No

## 2019-05-16 NOTE — PROGRESS NOTES
Assessment/Plan:     Diagnoses and all orders for this visit:    1. Cerebrovascular accident (CVA) due to other mechanism ([address]  -     [address]  We will continue to mitigate her multiple risk factors including smoking cessation, dietary improvement, and blood pressure control. Refer to neurology and cardiology to establish care. She continues to undergo evaluation to rule out atrial fibrillation. 2. Tobacco abuse  I have strongly encouraged cessation to avoid repeat events. She appears motivated for cessation at this time. 3. Opioid dependence in remission [facility])  Referred to counseling for evaluation and stress management. She is compliant with methadone treatment. 4. Essential hypertension  Currently well controlled on therapy. Continue at this time. Follow-up and Dispositions    · Return in about 3 months (around 8/16/2019) for Follow Up fasting . Discussed expected course/resolution/complications of diagnosis in detail with patient.    Medication risks/benefits/costs/interactions/alternatives discussed with patient.    Pt was given after visit summary which includes diagnoses, current medications & vitals. Pt expressed understanding with the diagnosis and plan          Subjective:      Rigoberto Blood is a 41 y.o. female who presents for had concerns including Hospital Follow Up [facility] 5/6 for Transient Ischemic Attack). Hospital Follow Up  Rigoberto Blood is seen for follow up from recent admission to [facility] on 5/6/2019. We reviewed the lab results, imaging, the notes, the records. She presented with left sided facial weakness. She is taking her routine medications as directed & without any side effects. She reports symptoms are improved. Medication Reconciliation reviewed today. She is accompanied by her  today. She is continuing to use tobacco although she has cut back on quantity.   She was smoking on the nicotine patches so discontinued the patches. She reports some mild feelings of sadness since her stroke 1 week ago. She denies suicidal or homicidal ideation. She is currently unemployed. She was unable to be seen by rheumatology due to substantial weight for evaluation. She has chronic joint pains and positive FRANK. She has received previous treatment for chronic hepatitis C which is in remission. She is compliant with medication therapy for high blood pressure at this time. She denies headache, dizziness, shortness of breath, chest pain. She is not scheduled with neurology for follow-up. She is not scheduled with cardiology for follow-up. She is currently wearing a heart monitor to rule out atrial fibrillation. She continues to struggle with left-sided weakness associated with her previous stroke. She is currently working with skilled nursing, physical therapy, occupational therapy from her home and does note improvement in this last week. She is tolerating a regular diet without incident. She continues on methadone for previous opioid dependency. Current Outpatient Medications   Medication Sig Dispense Refill    carvedilol (COREG) 12.5 mg tablet Take 1 Tab by mouth two (2) times daily (with meals). 60 Tab 0    losartan (COZAAR) 100 mg tablet Take 1 Tab by mouth daily. 30 Tab 0    aspirin 81 mg chewable tablet Take 1 Tab by mouth daily. 30 Tab 0    clopidogrel (PLAVIX) 75 mg tab Take 1 Tab by mouth daily. 30 Tab 0    pravastatin (PRAVACHOL) 40 mg tablet Take 1 Tab by mouth nightly. 30 Tab 0    methadone (DOLOPHINE) 10 mg tablet Take 8 Tabs by mouth daily. Max Daily Amount: 80 mg. 1 Tab 0    nicotine (NICODERM CQ) 21 mg/24 hr 1 Patch by TransDERmal route every twenty-four (24) hours for 30 days.  30 Patch 0       Allergies   Allergen Reactions    Amlodipine Swelling     Leg swelling    Hydrochlorothiazide Other (comments)     Intolerance: polyuria    Nsaids (Non-Steroidal Anti-Inflammatory Drug) Unknown (comments)     Dr. Muriel Summers told me no NSAID's because of hypertension problems. ROS:   Review of Systems   Constitutional: Negative for malaise/fatigue. Eyes: Negative for blurred vision. Respiratory: Negative for shortness of breath. Cardiovascular: Negative for chest pain. Neurological: Positive for weakness. Psychiatric/Behavioral: Positive for depression and memory loss. Negative for substance abuse. The patient is not nervous/anxious and does not have insomnia. Objective:     Visit Vitals  /67   Pulse (!) 54   Temp 97.9 °F (36.6 °C) (Oral)   Resp 16   Ht 5' 5\" (1.651 m)   Wt 125 lb (56.7 kg)   SpO2 100%   BMI 20.80 kg/m²       Vitals and Nurse Documentation reviewed. Physical Exam   Constitutional: No distress. Cardiovascular: S1 normal and S2 normal. Exam reveals no gallop and no friction rub. No murmur heard. Pulmonary/Chest: Breath sounds normal. No respiratory distress. Neurological: She displays weakness (left sided weakness. Gait is assisted with cane. ) and abnormal speech (slurred speech at times. Mild aphasia intermittently. ). Skin: Skin is warm and dry.    Psychiatric: Mood and affect normal.

## 2019-05-16 NOTE — PATIENT INSTRUCTIONS
1. Check blood pressure twice weekly. Call if top number is less than 110. Number is [phone]    8. Stop Smoking. Think about nicorette gum. 3.  You call Neurology and Cardiology. 4.  [place] Program to Reversing Heart Disease. Aim for mostly fruits, vegetables, grains, and beans. Download \"Daily Dozen\" Ruperto which will show you how much to eat and foods to aim at eating daily.

## 2019-05-17 ENCOUNTER — HOME CARE VISIT (OUTPATIENT)
Dept: SCHEDULING | Facility: HOME HEALTH | Age: 39
End: 2019-05-17
Payer: MEDICAID

## 2019-05-17 PROCEDURE — G0300 HHS/HOSPICE OF LPN EA 15 MIN: HCPCS

## 2019-05-17 PROCEDURE — G0152 HHCP-SERV OF OT,EA 15 MIN: HCPCS

## 2019-05-19 VITALS
OXYGEN SATURATION: 98 % | HEART RATE: 66 BPM | SYSTOLIC BLOOD PRESSURE: 119 MMHG | TEMPERATURE: 97.7 F | DIASTOLIC BLOOD PRESSURE: 65 MMHG | RESPIRATION RATE: 16 BRPM

## 2019-05-20 ENCOUNTER — HOME CARE VISIT (OUTPATIENT)
Dept: SCHEDULING | Facility: HOME HEALTH | Age: 39
End: 2019-05-20
Payer: MEDICAID

## 2019-05-20 VITALS
RESPIRATION RATE: 18 BRPM | TEMPERATURE: 97.6 F | DIASTOLIC BLOOD PRESSURE: 64 MMHG | OXYGEN SATURATION: 97 % | HEART RATE: 66 BPM | SYSTOLIC BLOOD PRESSURE: 110 MMHG

## 2019-05-20 PROCEDURE — G0152 HHCP-SERV OF OT,EA 15 MIN: HCPCS

## 2019-05-21 ENCOUNTER — HOME CARE VISIT (OUTPATIENT)
Dept: SCHEDULING | Facility: HOME HEALTH | Age: 39
End: 2019-05-21
Payer: MEDICAID

## 2019-05-21 VITALS
TEMPERATURE: 98.4 F | DIASTOLIC BLOOD PRESSURE: 80 MMHG | OXYGEN SATURATION: 97 % | RESPIRATION RATE: 14 BRPM | HEART RATE: 76 BPM | SYSTOLIC BLOOD PRESSURE: 118 MMHG

## 2019-05-21 PROCEDURE — G0300 HHS/HOSPICE OF LPN EA 15 MIN: HCPCS

## 2019-05-21 PROCEDURE — G0151 HHCP-SERV OF PT,EA 15 MIN: HCPCS

## 2019-05-22 ENCOUNTER — HOME CARE VISIT (OUTPATIENT)
Dept: SCHEDULING | Facility: HOME HEALTH | Age: 39
End: 2019-05-22
Payer: MEDICAID

## 2019-05-22 VITALS
SYSTOLIC BLOOD PRESSURE: 118 MMHG | DIASTOLIC BLOOD PRESSURE: 80 MMHG | TEMPERATURE: 98.4 F | RESPIRATION RATE: 14 BRPM | HEART RATE: 76 BPM | OXYGEN SATURATION: 97 %

## 2019-05-22 VITALS
SYSTOLIC BLOOD PRESSURE: 110 MMHG | HEART RATE: 76 BPM | DIASTOLIC BLOOD PRESSURE: 60 MMHG | RESPIRATION RATE: 16 BRPM | OXYGEN SATURATION: 97 % | TEMPERATURE: 98.6 F

## 2019-05-22 VITALS
HEART RATE: 57 BPM | SYSTOLIC BLOOD PRESSURE: 130 MMHG | OXYGEN SATURATION: 97 % | TEMPERATURE: 98.2 F | DIASTOLIC BLOOD PRESSURE: 82 MMHG

## 2019-05-22 PROCEDURE — G0152 HHCP-SERV OF OT,EA 15 MIN: HCPCS

## 2019-05-23 ENCOUNTER — HOME CARE VISIT (OUTPATIENT)
Dept: SCHEDULING | Facility: HOME HEALTH | Age: 39
End: 2019-05-23
Payer: MEDICAID

## 2019-05-23 NOTE — Clinical Note
Pt. texted PT this morning stating that she had to go to the school for a meeting about her son at 6 today, the time scheduled for our appointment, and then needed to take him to see a counselor after that. She requested a reschedule and PT rescheduled for 3 p.m. this afternoon. Pt. texted me at 2:40 stating that she was still with her son at [facility] \[facility] [place] [facility]\"" and was exhausted. She requested a change in appointment for tomorrow. PT only had 8 or 8:15 appointment available for tomorrow but pt. stated that that would not work for her as she would need to go to methadone clinic before being able to participate with PT. This will be a missed visit.   Pt. states that she will continue to diligently work on her HEP and I will see her next week to continue with [facility] PT.

## 2019-05-24 ENCOUNTER — HOME CARE VISIT (OUTPATIENT)
Dept: SCHEDULING | Facility: HOME HEALTH | Age: 39
End: 2019-05-24
Payer: MEDICAID

## 2019-05-24 PROCEDURE — G0300 HHS/HOSPICE OF LPN EA 15 MIN: HCPCS

## 2019-05-28 ENCOUNTER — HOME CARE VISIT (OUTPATIENT)
Dept: SCHEDULING | Facility: HOME HEALTH | Age: 39
End: 2019-05-28
Payer: MEDICAID

## 2019-05-28 VITALS
RESPIRATION RATE: 16 BRPM | DIASTOLIC BLOOD PRESSURE: 60 MMHG | OXYGEN SATURATION: 96 % | SYSTOLIC BLOOD PRESSURE: 110 MMHG | TEMPERATURE: 97.9 F | HEART RATE: 61 BPM

## 2019-05-28 VITALS
TEMPERATURE: 98.3 F | SYSTOLIC BLOOD PRESSURE: 122 MMHG | RESPIRATION RATE: 14 BRPM | DIASTOLIC BLOOD PRESSURE: 80 MMHG | OXYGEN SATURATION: 96 % | HEART RATE: 75 BPM

## 2019-05-28 VITALS
TEMPERATURE: 98.7 F | OXYGEN SATURATION: 96 % | RESPIRATION RATE: 18 BRPM | HEART RATE: 65 BPM | SYSTOLIC BLOOD PRESSURE: 112 MMHG | DIASTOLIC BLOOD PRESSURE: 64 MMHG

## 2019-05-28 PROCEDURE — G0152 HHCP-SERV OF OT,EA 15 MIN: HCPCS

## 2019-05-28 PROCEDURE — G0151 HHCP-SERV OF PT,EA 15 MIN: HCPCS

## 2019-05-29 ENCOUNTER — HOME CARE VISIT (OUTPATIENT)
Dept: SCHEDULING | Facility: HOME HEALTH | Age: 39
End: 2019-05-29
Payer: MEDICAID

## 2019-05-29 PROCEDURE — G0300 HHS/HOSPICE OF LPN EA 15 MIN: HCPCS

## 2019-05-30 ENCOUNTER — HOME CARE VISIT (OUTPATIENT)
Dept: SCHEDULING | Facility: HOME HEALTH | Age: 39
End: 2019-05-30
Payer: MEDICAID

## 2019-05-30 ENCOUNTER — HOME CARE VISIT (OUTPATIENT)
Dept: HOME HEALTH SERVICES | Facility: HOME HEALTH | Age: 39
End: 2019-05-30
Payer: MEDICAID

## 2019-05-30 VITALS
TEMPERATURE: 98.5 F | SYSTOLIC BLOOD PRESSURE: 108 MMHG | HEART RATE: 71 BPM | DIASTOLIC BLOOD PRESSURE: 86 MMHG | RESPIRATION RATE: 14 BRPM | OXYGEN SATURATION: 97 %

## 2019-05-30 VITALS
SYSTOLIC BLOOD PRESSURE: 120 MMHG | DIASTOLIC BLOOD PRESSURE: 74 MMHG | TEMPERATURE: 98.4 F | HEART RATE: 66 BPM | OXYGEN SATURATION: 98 % | RESPIRATION RATE: 18 BRPM

## 2019-05-30 VITALS
SYSTOLIC BLOOD PRESSURE: 136 MMHG | OXYGEN SATURATION: 98 % | HEART RATE: 71 BPM | TEMPERATURE: 98 F | DIASTOLIC BLOOD PRESSURE: 87 MMHG

## 2019-05-30 PROCEDURE — G0151 HHCP-SERV OF PT,EA 15 MIN: HCPCS

## 2019-05-30 PROCEDURE — G0152 HHCP-SERV OF OT,EA 15 MIN: HCPCS

## 2019-06-04 ENCOUNTER — HOME CARE VISIT (OUTPATIENT)
Dept: SCHEDULING | Facility: HOME HEALTH | Age: 39
End: 2019-06-04
Payer: MEDICAID

## 2019-06-04 VITALS
OXYGEN SATURATION: 99 % | HEART RATE: 62 BPM | SYSTOLIC BLOOD PRESSURE: 118 MMHG | RESPIRATION RATE: 14 BRPM | DIASTOLIC BLOOD PRESSURE: 74 MMHG | TEMPERATURE: 98.1 F

## 2019-06-04 VITALS
TEMPERATURE: 97.4 F | WEIGHT: 123 LBS | BODY MASS INDEX: 21 KG/M2 | OXYGEN SATURATION: 97 % | HEIGHT: 64 IN | HEART RATE: 60 BPM | DIASTOLIC BLOOD PRESSURE: 76 MMHG | RESPIRATION RATE: 12 BRPM | SYSTOLIC BLOOD PRESSURE: 118 MMHG

## 2019-06-04 PROCEDURE — G0151 HHCP-SERV OF PT,EA 15 MIN: HCPCS

## 2019-06-04 PROCEDURE — G0299 HHS/HOSPICE OF RN EA 15 MIN: HCPCS

## 2019-06-06 ENCOUNTER — HOME CARE VISIT (OUTPATIENT)
Dept: SCHEDULING | Facility: HOME HEALTH | Age: 39
End: 2019-06-06
Payer: MEDICAID

## 2019-06-06 VITALS
SYSTOLIC BLOOD PRESSURE: 134 MMHG | TEMPERATURE: 97.6 F | HEART RATE: 71 BPM | OXYGEN SATURATION: 97 % | DIASTOLIC BLOOD PRESSURE: 92 MMHG

## 2019-06-06 PROCEDURE — G0151 HHCP-SERV OF PT,EA 15 MIN: HCPCS

## 2019-06-07 ENCOUNTER — TELEPHONE (OUTPATIENT)
Dept: RHEUMATOLOGY | Age: 39
End: 2019-06-07

## 2019-06-07 NOTE — TELEPHONE ENCOUNTER
I called and confirmed with the patient on 6/7/2019 for their next day 6/10/2019 appointment to arrive 30 minutes before their appointment to fill out office paperwork. SDH. 

## 2019-06-11 ENCOUNTER — TELEPHONE (OUTPATIENT)
Dept: FAMILY MEDICINE CLINIC | Age: 39
End: 2019-06-11

## 2019-06-11 NOTE — TELEPHONE ENCOUNTER
Massage is from Emeli ----- Message from Iris López sent at 6/11/2019  4:31 PM EDT -----  Regarding: STEPHANE Thompson / REFILL  Pt is requesting to speak with nurse in regard to refilling all current medications.       Best contact: [phone]

## 2019-06-12 RX ORDER — LOSARTAN POTASSIUM 100 MG/1
100 TABLET ORAL DAILY
Qty: 90 TAB | Refills: 1 | Status: SHIPPED | OUTPATIENT
Start: 2019-06-12 | End: 2019-12-01 | Stop reason: SDUPTHER

## 2019-06-12 RX ORDER — CLOPIDOGREL BISULFATE 75 MG/1
75 TABLET ORAL DAILY
Qty: 90 TAB | Refills: 1 | Status: SHIPPED | OUTPATIENT
Start: 2019-06-12 | End: 2019-12-01 | Stop reason: SDUPTHER

## 2019-06-12 RX ORDER — GUAIFENESIN 100 MG/5ML
81 LIQUID (ML) ORAL DAILY
Qty: 90 TAB | Refills: 1 | Status: SHIPPED | OUTPATIENT
Start: 2019-06-12 | End: 2019-12-01 | Stop reason: SDUPTHER

## 2019-06-12 RX ORDER — CARVEDILOL 12.5 MG/1
12.5 TABLET ORAL 2 TIMES DAILY WITH MEALS
Qty: 180 TAB | Refills: 1 | Status: SHIPPED | OUTPATIENT
Start: 2019-06-12 | End: 2020-01-16

## 2019-06-12 RX ORDER — PRAVASTATIN SODIUM 40 MG/1
40 TABLET ORAL
Qty: 90 TAB | Refills: 1 | Status: SHIPPED | OUTPATIENT
Start: 2019-06-12 | End: 2019-12-01 | Stop reason: SDUPTHER

## 2019-06-12 NOTE — TELEPHONE ENCOUNTER
Outbound call to patient. Patient request for PCP to refill medications as hospital only gave patient a 30 day supply.  Will route to provider

## 2019-08-02 ENCOUNTER — OFFICE VISIT (OUTPATIENT)
Dept: RHEUMATOLOGY | Age: 39
End: 2019-08-02

## 2019-08-02 VITALS
RESPIRATION RATE: 16 BRPM | SYSTOLIC BLOOD PRESSURE: 153 MMHG | OXYGEN SATURATION: 96 % | DIASTOLIC BLOOD PRESSURE: 112 MMHG | HEIGHT: 64 IN | HEART RATE: 69 BPM | WEIGHT: 117.8 LBS | TEMPERATURE: 98.9 F | BODY MASS INDEX: 20.11 KG/M2

## 2019-08-02 DIAGNOSIS — I63.89 CEREBROVASCULAR ACCIDENT (CVA) DUE TO OTHER MECHANISM (HCC): ICD-10-CM

## 2019-08-02 DIAGNOSIS — M22.41 CHONDROMALACIA OF RIGHT PATELLA: Primary | ICD-10-CM

## 2019-08-02 DIAGNOSIS — R76.8 POSITIVE ANA (ANTINUCLEAR ANTIBODY): ICD-10-CM

## 2019-08-02 RX ORDER — DICLOFENAC SODIUM 10 MG/G
2 GEL TOPICAL 4 TIMES DAILY
Qty: 1 EACH | Refills: 6 | Status: SHIPPED | OUTPATIENT
Start: 2019-08-02 | End: 2022-03-16 | Stop reason: ALTCHOICE

## 2019-08-02 NOTE — PROGRESS NOTES
Chief Complaint   Patient presents with    Other     Positive FRANK    Joint Pain     knee     Pt  bp elevated. States she didn't take meds today or last pm. Dr. Bing Guzmán notified.

## 2019-08-02 NOTE — PROGRESS NOTES
REASON FOR VISIT    This is the initial evaluation for Ms. Leif Quigley a 41 y.o.  female for question of knee pain. The patient is referred to the [facility] at the request of [facility]. HISTORY OF PRESENT ILLNESS     Previous medical records reviewed and summarized: yes    MHAQ: 0  Pain scale: 7/10    This is a 41 y.o. female with hx of HTN, CVA, migraines, HCV s/p treatment. The patient was referred here due to a positive FRANK. The patient notes that she used to do gymnastics and within last 5 years she has popping in her knees. IT hurts to straighten out her leg if they are bent. They are painful and they swell. They swell if she is on them too long. They are painful if she is sitting with them bent too long or if she is standing too long. Pain is not improved by anything. She was taking advil, motrin and they weren't helping. NO other joints hurt. She has afib and therefore had a stroke 3 months ago per the patient. She is currently undergoing work up for Afib. No pregnancy losses      REVIEW OF SYSTEMS    A 15 point review of systems was performed and summarized below. The questionnaire was reviewed with the patient and scanned into the patient's medical record.     General: denies recent weight gain, recent weight loss, fatigue, weakness, fever, night sweats  Musculoskeletal: endorses joint pain, joint swelling, morning stiffnessdenies, muscle pain  Ears: endorses loss of hearing,denies ringing in ears,  deafness  Eyes: endorses loss of vision, denies pain, redness,double vision, blurred vision, dryness, foreign body sensation  Mouth: denies sore tongue, oral ulcers, bleeding gums, loss of taste, dryness, increased dental caries  Nose: denies nosebleeds, loss of smell, nasal ulcers  Throat: denies frequent sore throats, hoarseness, difficulty in swallowing, pain in jaw while chewing  Neck: denies swollen glands, tender glands  Cardiopulmonary: endorses irregular heart beat,denies pain in chest, sudden changes in heart beat, shortness of breath, difficulty breathing at night, dry cough, productive cough, coughing of blood, wheezing  Gastrointestinal: denies nausea, heartburn, stomach pain relieved by food, vomiting of blood/\"coffee grounds\", jaundice, increasing constipation, persistent diarrhea, blood in stools, black stools  Genitourinary: denies nocturia, difficult urination, pain or burning on urination, blood in urine, cloudy urine, pus in urine, genital discharge, frequent urination, vaginal dryness, rash/ulcers, sexual difficulties  Hematologic: endorses anemia,denies  bleeding tendency, blood clots  Skin: denies easy bruising, sun sensitive, rash, redness, hives, skin tightness, nodules/bumps, hair loss, color changes of hands or feet in the cold (Raynaud's), nailbed changes, mechanics hands  Neurologic: endorses headaches, numbness or tingling in hands/feet,denies  dizziness, muscle weakness,  memory loss  Psychiatric: endorses depression, excessive worries, PTSD, Bipolardenies   Sleep: endorses denies poor sleep, denies snoring, apnea, daytime somnolence, difficulty falling asleep, difficulty staying asleep     PAST MEDICAL HISTORY    Past Medical History:   Diagnosis Date    ex- IVDU (intravenous drug user)     H/O hysterectomy for benign disease     Hypertension     [facility], [facility] ([facility]     Methadone maintenance therapy patient ([facility]     Other ill-defined conditions(799.89) ovarian cyst    PTSD (post-traumatic stress disorder)     Thalassemia         Past Surgical History:   Procedure Laterality Date    HX GYN      HX HEENT      t&a    HX ARIANA AND BSO  2009    Endometriosis, Uterine Septum       FAMILY HISTORY    Family History   Problem Relation Age of Onset    Hypertension Mother    [facility] Arthritis-rheumatoid Mother     Hypertension Sister     Asthma Sister     Heart Disease Sister     Diabetes Maternal Aunt     Diabetes Maternal Uncle     Other Son         Lactose Intolerance    Asthma Daughter     Other Daughter         Alpha Thalessmia       SOCIAL HISTORY    Social History     Tobacco Use    Smoking status: Current Every Day Smoker     Packs/day: 0.50    Smokeless tobacco: Current User   Substance Use Topics    Alcohol use: No    Drug use: No       MEDICATIONS    Current Outpatient Medications   Medication Sig Dispense Refill    diclofenac (VOLTAREN) 1 % gel Apply 2 g to affected area four (4) times daily. 1 Each 6    losartan (COZAAR) 100 mg tablet Take 1 Tab by mouth daily. 90 Tab 1    carvedilol (COREG) 12.5 mg tablet Take 1 Tab by mouth two (2) times daily (with meals). 180 Tab 1    aspirin 81 mg chewable tablet Take 1 Tab by mouth daily. 90 Tab 1    clopidogrel (PLAVIX) 75 mg tab Take 1 Tab by mouth daily. 90 Tab 1    pravastatin (PRAVACHOL) 40 mg tablet Take 1 Tab by mouth nightly. 90 Tab 1    methadone (DOLOPHINE) 10 mg tablet Take 8 Tabs by mouth daily. Max Daily Amount: 80 mg. 1 Tab 0       ALLERGIES    Allergies   Allergen Reactions    Amlodipine Swelling     Leg swelling    Hydrochlorothiazide Other (comments)     Intolerance: polyuria    Nsaids (Non-Steroidal Anti-Inflammatory Drug) Unknown (comments)     Dr. Tee White told me no NSAID's because of hypertension problems. PHYSICAL EXAMINATION    Visit Vitals  BP (!) 153/112 (BP 1 Location: Left arm, BP Patient Position: Sitting)   Pulse 69   Temp 98.9 °F (37.2 °C) (Oral)   Resp 16   Ht 5' 4\" (1.626 m)   Wt 117 lb 12.8 oz (53.4 kg)   SpO2 96%   BMI 20.22 kg/m²     Body mass index is 20.22 kg/m². General: NAD  HEENT: PERRL, anicteric, non-injected sclerae; oropharynx without ulcers, erythema, or exudate. Moist mucous membranes. Lymphatic: No cervical or axillary lymphadenopathy. Cardiovascular: S1, S2,no R/M/G  Pulmonary: CTA b/l. No wheezes/rales/rhonchi. Abdominal: Soft,NTND, + BS. Skin: No rash, nodules, or periungual changes.   Neuro: Alert; able to carry normal conversation    Musculoskeletal:   + patellofemoral compression test on the right side. DATA REVIEW    Prior medical records were reviewed and if applicable are summarized as below:    Labs:   3/2019: ESR, TSH normal, FRANK positive, Cbc, cmp unremarkable    Imaging:   Knee x-rays (3/2019): normal    ASSESSMENT AND PLAN    A 41 y.o. 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 Her knee pain is likely due to chondromalacia patella. At this time, I have a low suspicion for an autoimmune etiology and positive FRANK is likely a false positive but given that she had a thromboembolic event, I will evaluate her for APLS. # Positive FRANK:   - repeat FRANK IFA    # Hx of stroke due to thromboembolic event:  - undergoing work up for Afib  - will do APLS antibodies    # Chondromalacia patella:  - advised quad exercises  - will do knee injections next visit if this doesn't help.   - voltaren gel prescribed    # HTN:  - advised her to be compliant with her medications    The patient voiced understanding of the aforementioned assessment and plan. Summary of plan was provided in the After Visit Summary patient instructions. I also provided education about MyChart setup and utility. Ms. Duarte Ramirez has a reminder for a \"due or due soon\" health maintenance. I have asked that she contact her primary care provider for follow-up on this health maintenance.     TODAY'S ORDERS    Orders Placed This Encounter    ANTINUCLEAR ANTIBODIES, IFA    CARDIOLIPIN AB PANEL    BETA-2 GLYCOPROTEIN I ABS    LUPUS ANTICOAGULANT PANEL W/ REFLEX    diclofenac (VOLTAREN) 1 % gel       Future Appointments   Date Time Provider Tara Mcnair   8/13/2019 12:00 PM Ladonna Montenegro [facility]   8/20/2019  9:45 AM Richy Melendrez NP [facility]   9/3/2019  3:40 PM Naomi Sánchez MD [facility] Abril Parker MD    Adult Rheumatology   [facility]  [facility]  [id number] [place] [address] Way, [place], [address]   Phone [phone]  Fax [phone]

## 2019-08-02 NOTE — PATIENT INSTRUCTIONS
Please fill out your [address] you will receive after your visit in the mail or via [address]

## 2019-08-03 LAB
INTERPRETATION, 117893: NORMAL
SCREEN APTT: 33.7 SEC (ref 0–51.9)
SCREEN DRVVT: 42.6 SEC (ref 0–47)

## 2019-08-05 LAB
ANA TITR SER IF: NEGATIVE {TITER}
B2 GLYCOPROT1 IGA SER-ACNC: <9 GPI IGA UNITS (ref 0–25)
B2 GLYCOPROT1 IGG SER-ACNC: <9 GPI IGG UNITS (ref 0–20)
B2 GLYCOPROT1 IGM SER-ACNC: <9 GPI IGM UNITS (ref 0–32)
CARDIOLIPIN IGA SER IA-ACNC: <9 APL U/ML (ref 0–11)
CARDIOLIPIN IGG SER IA-ACNC: <9 GPL U/ML (ref 0–14)
CARDIOLIPIN IGM SER IA-ACNC: 9 MPL U/ML (ref 0–12)

## 2019-08-13 ENCOUNTER — DOCUMENTATION ONLY (OUTPATIENT)
Dept: FAMILY MEDICINE CLINIC | Age: 39
End: 2019-08-13

## 2019-08-13 NOTE — LETTER
Letter #3 No Show/Missed Appointment August 13, 2019 Kt Dixon 1530 [facility] 90 [facility], 15[address] [facility] Greetings Mrs. Cristhian Barr, We had an appointment reserved for you today and were concerned when you did not show or call within 24 hours to cancel the appointment. It appears there have been several appointments made with Mrs. Blas Felix which have not been kept. Your appointment was with Mrs. Blas Felix who provides [address] here at the practice. Our policy is to call patients two days prior to their appointment to remind them of the date and time. We perform these calls as a courtesy to our patients and to allow us the opportunity to rebook the time slot should the appointment not be necessary. Recognizing that everyones time is valuable and that appointment time is limited, we ask that you provide 24 hours notice if you are unable to keep your appointment. Mrs. Blas Felix schedule can often fill quickly and it is our goal to ensure that patients have access to resources outside of our office, as well. Therefore, we have included referrals and resources below. Please also set up an appointment to see Crispin Tian NP to follow up regarding the counseling referral. Wishing you all the best!   
 
Our Best, Onur Mendez, LCSW, CSOTP 851 [facility] 6600 [facility] 
 
and [address] [address] 
[phone]  
 
cc: Primary Care Provider 
incl: [address] [facility] Brochure/Referral 
 
Therapist Referrals and Resources Below there are referrals in [place], [facility] [facility], the [address] End below- note the city and/or zip codes. [facility] Nohemi Herrera, [address] [place], [facility]9 [facility]  
p [phone] Triple [address] [place], [address] 
p [phone]  
f [phone] 
 [facility] [facility] [address] [place] [address] [place], [address] 
p [phone] 
f [phone] Not Just Play, [facility] Bebo George 1401 [facility] [address] [place], [address]  
p [phone] [address] [address] [place], [place]  
p [phone] [facility] [address] [place], [address] 
p [phone] [place] [address] [facility] [address] [place], [address]  
p [phone] [address] [place] [address] [place], 7601 [facility]  
p [phone]  [facility] and [address] [facility],[address] [place] [address] & [facility], 1 [facility]  
p [phone] [address] [place], [address] 807 [facility], [address] 
p [phone] [facility], [address]  
p [phone] [facility], [facility] 
100 [facility] [facility], [address]  
p [phone] [facility] 1633 [facility], [facility], [address] 
p [phone] [address] [place], [address] [facility], [address] 
p [phone] [facility] [address] [facility], [address] [facility], [address] 
p [phone] 
www. [facility] of 2540 [facility], [address] 
p [phone] [facility] Nii FRANKS [facility], [address]  
p [phone]  [address], 45 [facility], 40 [facility]  
p [phone] [facility]  [facility], [address]  
p [phone] [address] 
[address] [facility] [address] [place], [address] [place] [address]  
p [phone] [facility] 
[address] [place], [address] [place] [address] 
p [phone] [address] [place], [address] [place] [address] 
p [phone] 
www.[email or web address] County Hospitalgayle. [address] [place], [address] [place] [address] 
p [phone] 
www.[email or web address] ProcesswareitzgerAspectivaCRE Secure  [facility] [address] [place], [address] [place] [address]  
p [phone] Vipin Watson, [place] [address] [place], [address] [facility]  
p [phone] [address]  
200 [facility], [address] 
p [phone] 
f [phone] [address] [facility] [address], [address], [place], [address] 
p [phone] or  
p [phone] 
f [phone] [place] [address] [facility] [address], [address] [facility] 23 
p [phone] 
f [address] [facility], 2[address] [facility] 
p [phone] [facility] and [employer] [address], [place] 
p [phone]   
f [phone] Dream[address] [facility], [phone] [facility] 23  
p [phone] [facility] and [address] 4370 [facility],  
[address] 
p [phone] 
p [phone] 
P [phone] 812 [facility] [address] [place]  
p [phone] In addition to the above referrals the following are additional resources: 
  
? Every insurance provider has a Member Services and/or [employer] phone number located on the card. Often these numbers on the back of your insurance card.  Calling your specific insurance provider is a great way to get a full list of any and all providers available to you.  
 
? You may also want to look into connecting with your insurance provider by using their website services for searching for providers. Calling the Member Services number on your card is good way to ask more about this. ? You may also want to explore the website of Psychology Today  or Therapy Den. NOTE- these are sites where providers pay to be listed and they are NOT SITES  that list ALL the providers your insurance covers. ? You may also return to your primary physician for additional resources and/or to follow up. When you make any appointment, be sure to confirm that they accept your insurance. The information in this communication is intended to be confidential to the Individual(s) and/or Entity to whom it is addressed. It may contain information of a Privileged and/or Confidential nature, which is subject to [place] and/or [facility] [employer]. In the event that you are not the intended recipient or the agent of the intended recipient, do not copy or use the information contained within this communication, or allow it to be read, copied or utilized in any manner, by any other person(s). Should this communication be received in error, please notify the sender immediately either by response e-mail or by phone, and permanently delete the original e-mail, attachment(s), and any copies.

## 2019-08-13 NOTE — PROGRESS NOTES
Not Pt Call- [facility],    Could you or your nurse reach out to this pt to follow up and give her some additional resources? She has made several appts to see me (3 or more) where she has no showed. I have sent letters and reached out with no response, but she keeps making appts and not showing up. At this point, she really needs to be referred to a counselor in the community vs coming back to see me again as the frequent No Show's are blocking times for other patients. .. And I am always on a wait list these days. ... Since shes no showed 3 (or more) times, I feel that referring her out is a good idea. The letter I mailed her in her chart today has my referral  List--- but she may also do really well with Medical Psychology at [address] [place] [address] the link:   [facility].Cint/service/medical-psychology/. I put their info in the letter envelope, too.      maybe you could call her and let her know that she doesn't need to reschedule with me :). Thanks!

## 2019-08-28 ENCOUNTER — OFFICE VISIT (OUTPATIENT)
Dept: CARDIOLOGY CLINIC | Age: 39
End: 2019-08-28

## 2019-08-28 VITALS
HEART RATE: 74 BPM | BODY MASS INDEX: 20.32 KG/M2 | WEIGHT: 119 LBS | SYSTOLIC BLOOD PRESSURE: 140 MMHG | RESPIRATION RATE: 15 BRPM | DIASTOLIC BLOOD PRESSURE: 88 MMHG | HEIGHT: 64 IN | OXYGEN SATURATION: 98 %

## 2019-08-28 DIAGNOSIS — I63.9 CEREBROVASCULAR ACCIDENT (CVA), UNSPECIFIED MECHANISM (HCC): Primary | ICD-10-CM

## 2019-08-28 DIAGNOSIS — I10 BENIGN ESSENTIAL HTN: ICD-10-CM

## 2019-08-28 NOTE — PROGRESS NOTES
[address]:   [phone]    History of Present Illness:   Ms. Bridget Chan is a 40 yo F with an admission for CVA, migraines, positive FRANK back in 05/2019. There was a question of embolic stroke, so she had an echocardiogram and loop monitor. Both were normal with no evidence of atrial fibrillation. From a symptom standpoint, she denies any chest pain, shortness of breath, palpitations, lightheadedness or dizziness. She is compensated on exam with clear lungs. Assessment and Plan:    1. CVA. She has followup with neurology. Her cardiac workup was unrevealing including an echocardiogram and loop monitor. No additional cardiac evaluation is indicated at this point and she can follow here on an as needed basis. 2. Hypertension. Blood pressure is controlled. 3. Migraines. She  has a past medical history of ex- IVDU (intravenous drug user), H/O hysterectomy for benign disease, Hypertension, Major depression, recurrent ([place] [address] Methadone maintenance therapy patient ([facility] Other ill-defined conditions(799.89) (ovarian cyst), PTSD (post-traumatic stress disorder), and Thalassemia. All other systems negative except as above. PE  Vitals:    08/28/19 1358   BP: 140/88   Pulse: 74   Resp: 15   SpO2: 98%   Weight: 119 lb (54 kg)   Height: 5' 4\" (1.626 m)    Body mass index is 20.43 kg/m².    General appearance - alert, well appearing, and in no distress  Mental status - affect appropriate to mood  Eyes - sclera anicteric, moist mucous membranes  Neck - supple, no JVD  Chest - clear to auscultation, no wheezes, rales or rhonchi  Heart - normal rate, regular rhythm, normal S1, S2, no murmurs, rubs, clicks or gallops  Abdomen - soft, nontender, nondistended, no masses or organomegaly  Neurological -no focal deficit  Extremities - peripheral pulses normal, no pedal edema      Recent Labs:  Lab Results   Component Value Date/Time    Cholesterol, total 188 05/07/2019 02:30 AM    HDL Cholesterol 37 05/07/2019 02:30 AM    LDL, calculated 103 (H) 05/07/2019 02:30 AM    Triglyceride 240 (H) 05/07/2019 02:30 AM    CHOL/HDL Ratio 5.1 (H) 05/07/2019 02:30 AM     Lab Results   Component Value Date/Time    Creatinine (POC) 0.9 03/01/2015 12:03 AM    Creatinine 1.01 05/05/2019 05:17 PM     Lab Results   Component Value Date/Time    BUN 10 05/05/2019 05:17 PM    BUN (POC) 12 03/01/2015 12:03 AM     Lab Results   Component Value Date/Time    Potassium 4.8 05/05/2019 05:17 PM     Lab Results   Component Value Date/Time    Hemoglobin A1c 5.7 05/07/2019 02:30 AM     Lab Results   Component Value Date/Time    Hemoglobin (POC) 15.0 03/01/2015 12:03 AM    HGB 11.9 05/07/2019 02:30 AM     Lab Results   Component Value Date/Time    PLATELET 802 35/91/9684 02:30 AM       Reviewed:  Past Medical History:   Diagnosis Date    ex- IVDU (intravenous drug user)     H/O hysterectomy for benign disease     Hypertension     [facility], [facility] ([facility]     Methadone maintenance therapy patient ([facility]     Other ill-defined conditions(799.89) ovarian cyst    PTSD (post-traumatic stress disorder)     Thalassemia      Social History     Tobacco Use   Smoking Status Current Every Day Smoker    Packs/day: 0.50   Smokeless Tobacco Current User     Social History     Substance and Sexual Activity   Alcohol Use No     Allergies   Allergen Reactions    Amlodipine Swelling     Leg swelling    Hydrochlorothiazide Other (comments)     Intolerance: polyuria    Nsaids (Non-Steroidal Anti-Inflammatory Drug) Unknown (comments)     Dr. Mars Michael told me no NSAID's because of hypertension problems. Current Outpatient Medications   Medication Sig    diclofenac (VOLTAREN) 1 % gel Apply 2 g to affected area four (4) times daily.  losartan (COZAAR) 100 mg tablet Take 1 Tab by mouth daily.  carvedilol (COREG) 12.5 mg tablet Take 1 Tab by mouth two (2) times daily (with meals).     aspirin 81 mg chewable tablet Take 1 Tab by mouth daily.  clopidogrel (PLAVIX) 75 mg tab Take 1 Tab by mouth daily.  pravastatin (PRAVACHOL) 40 mg tablet Take 1 Tab by mouth nightly.  methadone (DOLOPHINE) 10 mg tablet Take 8 Tabs by mouth daily. Max Daily Amount: 80 mg. No current facility-administered medications for this visit.         Shanna Peabody, MD  [facility] [place]  [address] [facility] 83,[address] [place] [address]

## 2019-08-28 NOTE — PROGRESS NOTES
Chief Complaint   Patient presents with    Follow-up     1. Have you been to the ER, urgent care clinic since your last visit? Hospitalized since your last visit? No    2. Have you seen or consulted any other health care providers outside of the [address] [place] [address] since your last visit? Include any pap smears or colon screening. No    3) Do you have an Advance Directive on file?  no

## 2019-09-11 ENCOUNTER — OFFICE VISIT (OUTPATIENT)
Dept: FAMILY MEDICINE CLINIC | Age: 39
End: 2019-09-11

## 2019-09-11 VITALS
DIASTOLIC BLOOD PRESSURE: 91 MMHG | RESPIRATION RATE: 16 BRPM | WEIGHT: 116 LBS | SYSTOLIC BLOOD PRESSURE: 141 MMHG | BODY MASS INDEX: 19.81 KG/M2 | HEIGHT: 64 IN | OXYGEN SATURATION: 99 % | HEART RATE: 65 BPM | TEMPERATURE: 98.2 F

## 2019-09-11 DIAGNOSIS — Z23 ENCOUNTER FOR IMMUNIZATION: ICD-10-CM

## 2019-09-11 DIAGNOSIS — T14.8XXA HEMATOMA: Primary | ICD-10-CM

## 2019-09-11 NOTE — PROGRESS NOTES
Assessment/Plan:     Diagnoses and all orders for this visit:    1. Hematoma  We have discussed the self limiting nature of the illness. Continue with symptomatic care including heat and massage at this time. Follow up if symptoms persist.     2. Encounter for immunization  -     INFLUENZA VIRUS VAC QUAD,SPLIT,PRESV FREE SYRINGE IM  -     KY IMMUNIZ ADMIN,1 SINGLE/COMB VAC/TOXOID  -     PNEUMOCOCCAL POLYSACCHARIDE VACCINE, 23-VALENT, ADULT OR IMMUNOSUPPRESSED PT DOSE,        Follow-up and Dispositions    · Return in about 2 months (around 11/11/2019) for Complete Physical.         Discussed expected course/resolution/complications of diagnosis in detail with patient. Medication risks/benefits/costs/interactions/alternatives discussed with patient. Pt was given after visit summary which includes diagnoses, current medications & vitals. Pt expressed understanding with the diagnosis and plan          Subjective:      Marcello Kaufman is a 46 y.o. female who presents for had concerns including Knee Pain (right fernando x 2 weeks ) and Knee Injury (2 weeks ago). Reports an injury to the right leg when she ran into a table at home. Symptoms have been improving over time. Denies associated discomfort. This is her first evaluation. Is not currently taking otcs. Is interested in evaluation of a mass which has developed in the area of injury. Denies a history of similar symptoms. She is on blood thinners for history of CVA. Continues on daily methadone for history of heroin abuse.       Patient Active Problem List   Diagnosis Code    Essential hypertension I10    Alpha thalassemia ([facility] D56.0    Chronic hepatitis C without hepatic coma ([address] B18.2    Nephrolithiasis N20.0    TIA (transient ischemic attack) G45.9    CVA (cerebral vascular accident) ([address] I63.9    Opioid dependence in remission ([address] F11.21    Tobacco abuse Z72.0       Current Outpatient Medications   Medication Sig Dispense Refill  diclofenac (VOLTAREN) 1 % gel Apply 2 g to affected area four (4) times daily. 1 Each 6    losartan (COZAAR) 100 mg tablet Take 1 Tab by mouth daily. 90 Tab 1    carvedilol (COREG) 12.5 mg tablet Take 1 Tab by mouth two (2) times daily (with meals). 180 Tab 1    aspirin 81 mg chewable tablet Take 1 Tab by mouth daily. 90 Tab 1    clopidogrel (PLAVIX) 75 mg tab Take 1 Tab by mouth daily. 90 Tab 1    pravastatin (PRAVACHOL) 40 mg tablet Take 1 Tab by mouth nightly. 90 Tab 1    methadone (DOLOPHINE) 10 mg tablet Take 8 Tabs by mouth daily. Max Daily Amount: 80 mg. 1 Tab 0       Allergies   Allergen Reactions    Amlodipine Swelling     Leg swelling    Hydrochlorothiazide Other (comments)     Intolerance: polyuria    Nsaids (Non-Steroidal Anti-Inflammatory Drug) Unknown (comments)     Dr. Geraldo Dickens told me no NSAID's because of hypertension problems. ROS:   Review of Systems   Constitutional: Negative for malaise/fatigue. Eyes: Negative for blurred vision. Respiratory: Negative for shortness of breath. Cardiovascular: Negative for chest pain. Musculoskeletal: Negative for myalgias. Neurological: Negative for dizziness and headaches. Objective:     Visit Vitals  BP (!) 141/91   Pulse 65   Temp 98.2 °F (36.8 °C) (Oral)   Resp 16   Ht 5' 4\" (1.626 m)   Wt 116 lb (52.6 kg)   SpO2 99%   BMI 19.91 kg/m²       Vitals and Nurse Documentation reviewed. Physical Exam   Constitutional: No distress. Cardiovascular: S1 normal and S2 normal. Exam reveals no gallop and no friction rub. No murmur heard. Pulmonary/Chest: Breath sounds normal. No respiratory distress. Skin: Skin is warm and dry.         Psychiatric: Mood and affect normal.

## 2019-09-11 NOTE — PATIENT INSTRUCTIONS
[facility]   Address: [address], [facility], [address]   Phone: [phone]

## 2019-10-31 ENCOUNTER — OFFICE VISIT (OUTPATIENT)
Dept: NEUROLOGY | Age: 39
End: 2019-10-31

## 2019-10-31 VITALS
SYSTOLIC BLOOD PRESSURE: 128 MMHG | RESPIRATION RATE: 16 BRPM | HEART RATE: 74 BPM | HEIGHT: 64 IN | OXYGEN SATURATION: 97 % | DIASTOLIC BLOOD PRESSURE: 80 MMHG | BODY MASS INDEX: 20.14 KG/M2 | WEIGHT: 118 LBS

## 2019-10-31 DIAGNOSIS — I69.398 CVA, OLD, ALTERATIONS OF SENSATIONS: Primary | ICD-10-CM

## 2019-10-31 DIAGNOSIS — R20.9 CVA, OLD, ALTERATIONS OF SENSATIONS: Primary | ICD-10-CM

## 2019-10-31 RX ORDER — GABAPENTIN 100 MG/1
100 CAPSULE ORAL 3 TIMES DAILY
Qty: 90 CAP | Refills: 1 | Status: SHIPPED | OUTPATIENT
Start: 2019-10-31 | End: 2019-12-23

## 2019-10-31 NOTE — PATIENT INSTRUCTIONS
PRESCRIPTION REFILL POLICY [facility] Statement to Patients April 1, 2014 In an effort to ensure the large volume of patient prescription refills is processed in the most efficient and expeditious manner, we are asking our patients to assist us by calling your Pharmacy for all prescription refills, this will include also your  Mail Order Pharmacy. The pharmacy will contact our office electronically to continue the refill process. Please do not wait until the last minute to call your pharmacy. We need at least 48 hours (2days) to fill prescriptions. We also encourage you to call your pharmacy before going to  your prescription to make sure it is ready. With regard to controlled substance prescription refill requests (narcotic refills) that need to be picked up at our office, we ask your cooperation by providing us with at least 72 hours (3days) notice that you will need a refill. We will not refill narcotic prescription refill requests after 4:00pm on any weekday, Monday through Thursday, or after 2:00pm on Fridays, or on the weekends. We encourage everyone to explore another way of getting your prescription refill request processed using "[employer] our patient web portal through our electronic medical record system. "[employer] is an efficient and effective way to communicate your medication request directly to the office and  downloadable as an karin on your smart phone . "[employer] also features a review functionality that allows you to view your medication list as well as leave messages for your physician. Are you ready to get connected? If so please review the attatched instructions or speak to any of our staff to get you set up right away! Thank you so much for your cooperation. Should you have any questions please contact our Practice Administrator. The Physicians and Staff,  [facility]

## 2019-10-31 NOTE — PROGRESS NOTES
Chief Complaint   Patient presents with    Stroke         HISTORY OF PRESENT ILLNESS  Shelby Powell is a 46 y.o. female  who was last seen by me in the hospital in May of this year when she was admitted with left face, arm and leg weakness, difficulty with speech and balance. MRI of the brain revealed multiple embolic appearing infarctions in both middle cerebral artery distributions, more prominently on the right. She had an extensive work-up including echocardiogram, 30-day cardiac monitor, cerebral vasculature studies, hypercoagulability panel and all of this came back unremarkable. She has recovered well from her deficits except that she gets paresthesias/pain in left hand every now and then. Continues to take aspirin and statin. She does have a strong family history of stroke and heart attack at a young age in her family  She was taking estrogen supplements which she has now stopped. She has also been a smoker and is not smoking anymore. Has hypertension. Past Medical History:   Diagnosis Date    ex- IVDU (intravenous drug user)     H/O hysterectomy for benign disease     Hypertension     Major depression, recurrent ([facility]     [facility] maintenance [facility]     Other ill-defined conditions(799.89) ovarian cyst    PTSD (post-traumatic stress disorder)     Thalassemia      Current Outpatient Medications   Medication Sig    gabapentin (NEURONTIN) 100 mg capsule Take 1 Cap by mouth three (3) times daily. Max Daily Amount: 300 mg.    diclofenac (VOLTAREN) 1 % gel Apply 2 g to affected area four (4) times daily.  losartan (COZAAR) 100 mg tablet Take 1 Tab by mouth daily.  carvedilol (COREG) 12.5 mg tablet Take 1 Tab by mouth two (2) times daily (with meals).  aspirin 81 mg chewable tablet Take 1 Tab by mouth daily.  clopidogrel (PLAVIX) 75 mg tab Take 1 Tab by mouth daily.  pravastatin (PRAVACHOL) 40 mg tablet Take 1 Tab by mouth nightly.     methadone (DOLOPHINE) 10 mg tablet Take 8 Tabs by mouth daily. Max Daily Amount: 80 mg. No current facility-administered medications for this visit. Allergies   Allergen Reactions    Amlodipine Swelling     Leg swelling    Hydrochlorothiazide Other (comments)     Intolerance: polyuria    Nsaids (Non-Steroidal Anti-Inflammatory Drug) Unknown (comments)     Dr. Diane Winter told me no NSAID's because of hypertension problems. Family History   Problem Relation Age of Onset    Hypertension Mother    [facility] Arthritis-rheumatoid Mother     Hypertension Sister     Asthma Sister     Heart Disease Sister     Diabetes Maternal Aunt     Diabetes Maternal Uncle     Other Son         Lactose Intolerance    Asthma Daughter     Other Daughter         Alpha Thalessmia     Social History     Tobacco Use    Smoking status: Current Every Day Smoker     Packs/day: 0.50    Smokeless tobacco: Current User   Substance Use Topics    Alcohol use: No    Drug use: No     Past Surgical History:   Procedure Laterality Date    HX GYN      HX HEENT      t&a    HX ARIANA AND BSO  2009    Endometriosis, Uterine Septum         REVIEW OF SYSTEMS  Review of Systems - History obtained from the patient  Psychological ROS: negative  ENT ROS: negative  Hematological and Lymphatic ROS: negative  Endocrine ROS: negative  Respiratory ROS: no cough, shortness of breath, or wheezing  Cardiovascular ROS: no chest pain or dyspnea on exertion  Gastrointestinal ROS: no abdominal pain, change in bowel habits, or black or bloody stools  Genito-Urinary ROS: no dysuria, trouble voiding, or hematuria  Musculoskeletal ROS: negative  Dermatological ROS: negative      PHYSICAL EXAMINATION:    Visit Vitals  /80   Pulse 74   Resp 16   Ht 5' 4\" (1.626 m)   Wt 53.5 kg (118 lb)   SpO2 97%   BMI 20.25 kg/m²       NEUROLOGICAL EXAMINATION:     Mental Status:   Alert and oriented to person, place, and time.   Attention span and concentration are normal. Speech is fluent. Cranial Nerves:    II, III, IV, VI:  Visual acuity grossly intact. Visual fields are normal.    Pupils are equal, round, and reactive to light and accommodation. Extra-ocular movements are full and fluid. Fundoscopic exam was benign, no ptosis or nystagmus. V-XII: Hearing is grossly intact. Facial features are symmetric, with normal sensation and strength. The palate rises symmetrically and the tongue protrudes midline. Sternocleidomastoids 5/5. Motor Examination: Normal tone, bulk, and strength. 5/5 muscle strength throughout. No cogwheel rigidity or clonus present. Sensory exam:  Normal throughout to pinprick, temperature, and vibration sense. Normal proprioception. Coordination:  Finger to nose and rapid arm movement testing was normal.   No resting or intention tremor    Gait and Station:  Steady. Had slight difficulty with tandem walking. Normal arm swing. No Rhomberg or pronator drift. No muscle wasting or fasiculations noted. Reflexes:  DTRs 2+ throughout. Toes downgoing. LABS / IMAGING  Hypercoagulability lab work was negative. Factor VIII showed minimal elevation    Echocardiogram was negative for cardiac sources of emboli    Lab Results   Component Value Date/Time    Cholesterol, total 188 05/07/2019 02:30 AM    HDL Cholesterol 37 05/07/2019 02:30 AM    LDL, calculated 103 (H) 05/07/2019 02:30 AM    VLDL, calculated 48 05/07/2019 02:30 AM    Triglyceride 240 (H) 05/07/2019 02:30 AM    CHOL/HDL Ratio 5.1 (H) 05/07/2019 02:30 AM     Lab Results   Component Value Date/Time    Hemoglobin A1c 5.7 05/07/2019 02:30 AM     MRI Results (most recent):  Results from Hospital Encounter encounter on 05/06/19   MRI BRAIN WO CONT    Narrative INDICATION:  Headache and left-sided weakness    EXAMINATION:  MRI BRAIN WO CONTRAST    COMPARISON:  CT May 5, 2019    TECHNIQUE:  Multiplanar multisequence acquisition without contrast of the brain.       FINDINGS: Ventricles:  Midline, no hydrocephalus. Brain Parenchyma/Brainstem: Moderate sized acute cortical and subcortical  infarction in the right posterior MCA territory. Adjacent punctate foci of acute  infarction in the right centrum semiovale, as well as in the lateral right  occipital lobe. Small area of acute cortical infarction in the right lateral  frontal lobe. Small area of acute infarction posterior left frontal lobe near  the vertex. . No acute infarction. Intracranial Hemorrhage:  None. Basal Cisterns:  Normal.   Flow Voids:  Normal.  Additional Comments:  N/A. Impression IMPRESSION:  Multiple areas of acute infarction, involving the right MCA territory and  posterior left frontal lobe as described in detail above. CT Results (most recent):  Results from Hospital Encounter encounter on 05/06/19   CTA NECK    Narrative *PRELIMINARY REPORT*    Multiple areas of low attenuation in the right MCA territory, compatible with  acute ischemic infarcts, as depicted on the MRI. Irregularity of the proximal  right MCA. Decreased flow to anterior branch. Preliminary report was provided by Dr. Mavis Field, the on-call radiologist, at 2000    Final report to follow. *END PRELIMINARY REPORT*    EXAM:  CTA HEAD, CTA NECK    INDICATION:   stroke    COMPARISON:  MRI brain 9:10 AM May 7, 2019    CONTRAST: 100 mL of Isovue-370    TECHNIQUE:   Unenhanced head CT was performed. Multislice helical CT angiography of the head  was performed during uneventful rapid bolus intravenous administration of  contrast. Coronal and sagittal reformations and MIP images and 3D shaded surface  display renderings were generated. Delayed enhanced head CT was also performed. CT dose reduction was achieved through use of A standardized protocol tailored  for this examination and automatic exposure control for dose modulation. Adaptive statistical iterative reconstruction (ASIR) was utilized.     FINDINGS:  Noncontrast and delayed postcontrast CT:  Loss of gray-white matter differentiation in the posterior right MCA  distribution as well as in the insular ribbon with associated edema,  corresponding to the areas of restricted diffusion on MRI. No superimposed  hemorrhage. No midline shift. No hydrocephalus. Basal cisterns are clear. Small  amount of fluid in the left maxillary sinus. CTA NECK: There is a two-vessel aortic arch with patent origins. Bilateral  subclavian arteries are patent. Bilateral common carotid arteries are patent. Bilateral internal carotid arteries are widely patent without significant  stenosis by NASCET criteria. Bilateral vertebral arteries are codominant and patent. The imaged lung apices are clear. No thyroid nodule or cervical lymphadenopathy. CTA HEAD: Distal vertebral arteries and basilar artery are patent. Posterior  cerebral arteries are patent. Distal cervical internal carotid arteries are  patent. Anterior cerebral and anterior communicating arteries are patent. Left  middle cerebral artery and distal branches are patent. Right M1 segment  demonstrates intimal irregularity extending to the MCA bifurcation. There is an  acute occlusion in the posterior division M2 segment of the right middle  cerebral artery (7:848, 604B: 204). Likely infundibular origin of the posterior  communicating arteries bilaterally. Impression IMPRESSION:  1. Thromboembolic occlusion of the posterior division M2 segment of the right  middle cerebral artery. 2.  Luminal irregularity of the right M1 segment with mild to moderate stenosis. 3.  Patent proximal and distal internal carotid arteries. 4.  Ischemic changes in the right insular cortex and right posterior MCA  distribution consistent with the infarct seen on MRI. No superimposed  hemorrhage. ASSESSMENT    ICD-10-CM ICD-9-CM    1.  CVA, old, alterations of sensations I69.398 438.6 gabapentin (NEURONTIN) 100 mg capsule    R20.9 DISCUSSION  Ms. Rowena Louise had a cryptogenic, embolic appearing infarctions in the both middle cerebral artery distributions, more so on the right  She has recovered quite well except for some painful paresthesias in the left upper extremity  Extensive stroke work-up including hypercoagulability panel, extended cardiac monitoring has been negative  Continue aspirin and statin  She has now quit smoking and is not on estrogen supplements  Healthy lifestyle habits and importance of regular exercise was discussed  Stroke signs and symptoms were reviewed and if she develops any of those, she was advised to seek immediate medical attention  Preventing for symptomatic relief of paresthesias. Follow-up as needed      Emilia Shirley MD  Diplomate, American Board of Psychiatry & Neurology (Neurology)  [employer] Board of Psychiatry & Neurology (Clinical Neurophysiology)  Diplomate, American Board of Electrodiagnostic Medicine    This note will not be viewable in [address]

## 2019-11-12 ENCOUNTER — TELEPHONE (OUTPATIENT)
Dept: NEUROLOGY | Age: 39
End: 2019-11-12

## 2019-11-12 NOTE — TELEPHONE ENCOUNTER
Patient was seen 10/31/19 and started on Gabapentin 100mg TID for pain in left hand and foot but she has only been using it BID. Patient has had an increase in left hand and foot pain since starting the medication. Her pain level was previously a \"7\" and is now a \"10\". Patient discontinued Gabapentin yesterday but continues to have severe burning in hand/foot. Please advise.

## 2019-11-12 NOTE — TELEPHONE ENCOUNTER
* Message from Emeli below:        ----- Message from [email or web address] sent at 11/12/2019 12:31 PM EST -----  Regarding: dr payne/ [facility]  General Message/Vendor Calls    Caller's first and last name: Nicole Cantu, spouse      Reason for call: the pt is having burning in her hands and feet and believes it may be a reaction of the \"gabapentin\"      Callback required yes/no and why: yes      Best contact number(s)pt's # [phone]      Details to clarify the request:      [email or web address]

## 2019-11-12 NOTE — TELEPHONE ENCOUNTER
----- Message from Surinder BarksdaleEctor sent at 11/12/2019 12:31 PM EST -----  Regarding: dr payne/ [facility]  General Message/Vendor Calls    Caller's first and last name: Kaushik Calderon, spouse      Reason for call: the pt is having burning in her hands and feet and believes it may be a reaction of the \"gabapentin\"      Callback required yes/no and why: yes      Best contact number(s)pt's # [phone]      Details to clarify the request:      Surinder BarksdaleEctor

## 2019-11-12 NOTE — TELEPHONE ENCOUNTER
I have reviewed this information with patient and spouse. They both expressed understanding and agreed to this plan.

## 2019-11-12 NOTE — TELEPHONE ENCOUNTER
Gabapentin is typically started at a low dose i.e. 100 mg 2-3 times per day.  It is not expected to provide much relief at this dose. It is unusual for Gabapentin to cause increase in pain. I suggest she should retry taking 200 mg at least twice a day and can further increase to 300 mg twice a day.    If it does not help, she can let us know and we can try alternatives

## 2019-12-02 RX ORDER — GUAIFENESIN 100 MG/5ML
LIQUID (ML) ORAL
Qty: 30 TAB | Refills: 11 | Status: SHIPPED | OUTPATIENT
Start: 2019-12-02 | End: 2020-12-24 | Stop reason: SDUPTHER

## 2019-12-02 RX ORDER — CLOPIDOGREL BISULFATE 75 MG/1
TABLET ORAL
Qty: 30 TAB | Refills: 11 | Status: SHIPPED | OUTPATIENT
Start: 2019-12-02 | End: 2020-12-24 | Stop reason: SDUPTHER

## 2019-12-02 RX ORDER — PRAVASTATIN SODIUM 40 MG/1
TABLET ORAL
Qty: 30 TAB | Refills: 11 | Status: SHIPPED | OUTPATIENT
Start: 2019-12-02 | End: 2020-12-24 | Stop reason: SDUPTHER

## 2019-12-02 RX ORDER — LOSARTAN POTASSIUM 100 MG/1
TABLET ORAL
Qty: 30 TAB | Refills: 11 | Status: SHIPPED | OUTPATIENT
Start: 2019-12-02 | End: 2020-06-02 | Stop reason: RX

## 2019-12-23 ENCOUNTER — TELEPHONE (OUTPATIENT)
Dept: NEUROLOGY | Age: 39
End: 2019-12-23

## 2019-12-23 DIAGNOSIS — I69.398 CVA, OLD, ALTERATIONS OF SENSATIONS: Primary | ICD-10-CM

## 2019-12-23 DIAGNOSIS — R20.9 CVA, OLD, ALTERATIONS OF SENSATIONS: Primary | ICD-10-CM

## 2019-12-23 RX ORDER — GABAPENTIN 300 MG/1
300 CAPSULE ORAL
Qty: 90 CAP | Refills: 2 | Status: SHIPPED | OUTPATIENT
Start: 2019-12-23 | End: 2022-03-16 | Stop reason: ALTCHOICE

## 2019-12-23 NOTE — TELEPHONE ENCOUNTER
Spoke with patient, she states she is currently taking gabapentin 300mg TID with no improvement in her symptoms. She wanted  to be aware and if there is anything else she can do to help her pain.

## 2019-12-23 NOTE — TELEPHONE ENCOUNTER
Pt's  calling in regards to pt having multiple strokes back in May. States pt is on fire, left hand, foot, and shoulder are really hot. Right side is not hot at all. Gabapentin is not helping pt at all. Please call back.

## 2019-12-24 DIAGNOSIS — I69.398 CVA, OLD, ALTERATIONS OF SENSATIONS: Primary | ICD-10-CM

## 2019-12-24 DIAGNOSIS — R20.9 CVA, OLD, ALTERATIONS OF SENSATIONS: Primary | ICD-10-CM

## 2019-12-24 RX ORDER — DULOXETIN HYDROCHLORIDE 30 MG/1
CAPSULE, DELAYED RELEASE ORAL
Qty: 30 CAP | Refills: 0 | Status: SHIPPED | OUTPATIENT
Start: 2019-12-24 | End: 2020-01-03

## 2019-12-24 NOTE — TELEPHONE ENCOUNTER
She should try increasing the dose of gabapentin to up to 300 mg 3 times daily before we consider something else.    New Rx sent    Routing comment          Pt is already taking 300mg TID

## 2019-12-29 ENCOUNTER — APPOINTMENT (OUTPATIENT)
Dept: GENERAL RADIOLOGY | Age: 39
End: 2019-12-29
Attending: EMERGENCY MEDICINE
Payer: MEDICAID

## 2019-12-29 ENCOUNTER — HOSPITAL ENCOUNTER (EMERGENCY)
Age: 39
Discharge: HOME | End: 2019-12-29
Attending: EMERGENCY MEDICINE | Admitting: EMERGENCY MEDICINE
Payer: MEDICAID

## 2019-12-29 VITALS
HEART RATE: 70 BPM | HEIGHT: 64 IN | RESPIRATION RATE: 16 BRPM | OXYGEN SATURATION: 98 % | BODY MASS INDEX: 19.81 KG/M2 | WEIGHT: 116 LBS | DIASTOLIC BLOOD PRESSURE: 76 MMHG | SYSTOLIC BLOOD PRESSURE: 109 MMHG | TEMPERATURE: 98.1 F

## 2019-12-29 DIAGNOSIS — R19.7 NAUSEA, VOMITING, AND DIARRHEA: Primary | ICD-10-CM

## 2019-12-29 DIAGNOSIS — R11.2 NAUSEA, VOMITING, AND DIARRHEA: Primary | ICD-10-CM

## 2019-12-29 LAB
ALBUMIN SERPL-MCNC: 3.5 G/DL (ref 3.5–5)
ALBUMIN/GLOB SERPL: 0.7 {RATIO} (ref 1.1–2.2)
ALP SERPL-CCNC: 75 U/L (ref 45–117)
ALT SERPL-CCNC: 15 U/L (ref 12–78)
ANION GAP SERPL CALC-SCNC: 4 MMOL/L (ref 5–15)
APPEARANCE UR: CLEAR
AST SERPL-CCNC: 10 U/L (ref 15–37)
BACTERIA URNS QL MICRO: NEGATIVE /HPF
BASOPHILS # BLD: 0 K/UL (ref 0–0.1)
BASOPHILS NFR BLD: 0 % (ref 0–1)
BILIRUB SERPL-MCNC: 0.6 MG/DL (ref 0.2–1)
BILIRUB UR QL: NEGATIVE
BUN SERPL-MCNC: 33 MG/DL (ref 6–20)
BUN/CREAT SERPL: 22 (ref 12–20)
CALCIUM SERPL-MCNC: 9.5 MG/DL (ref 8.5–10.1)
CHLORIDE SERPL-SCNC: 103 MMOL/L (ref 97–108)
CO2 SERPL-SCNC: 30 MMOL/L (ref 21–32)
COLOR UR: NORMAL
CREAT SERPL-MCNC: 1.49 MG/DL (ref 0.55–1.02)
DIFFERENTIAL METHOD BLD: ABNORMAL
EOSINOPHIL # BLD: 0.1 K/UL (ref 0–0.4)
EOSINOPHIL NFR BLD: 1 % (ref 0–7)
EPITH CASTS URNS QL MICRO: NORMAL /LPF
ERYTHROCYTE [DISTWIDTH] IN BLOOD BY AUTOMATED COUNT: 15 % (ref 11.5–14.5)
GLOBULIN SER CALC-MCNC: 4.8 G/DL (ref 2–4)
GLUCOSE SERPL-MCNC: 100 MG/DL (ref 65–100)
GLUCOSE UR STRIP.AUTO-MCNC: NEGATIVE MG/DL
HCG UR QL: NEGATIVE
HCT VFR BLD AUTO: 41.5 % (ref 35–47)
HGB BLD-MCNC: 13 G/DL (ref 11.5–16)
HGB UR QL STRIP: NEGATIVE
IMM GRANULOCYTES # BLD AUTO: 0 K/UL (ref 0–0.04)
IMM GRANULOCYTES NFR BLD AUTO: 0 % (ref 0–0.5)
KETONES UR QL STRIP.AUTO: NEGATIVE MG/DL
LEUKOCYTE ESTERASE UR QL STRIP.AUTO: NEGATIVE
LIPASE SERPL-CCNC: 128 U/L (ref 73–393)
LYMPHOCYTES # BLD: 1.9 K/UL (ref 0.8–3.5)
LYMPHOCYTES NFR BLD: 28 % (ref 12–49)
MCH RBC QN AUTO: 27.1 PG (ref 26–34)
MCHC RBC AUTO-ENTMCNC: 31.3 G/DL (ref 30–36.5)
MCV RBC AUTO: 86.6 FL (ref 80–99)
MONOCYTES # BLD: 0.4 K/UL (ref 0–1)
MONOCYTES NFR BLD: 6 % (ref 5–13)
NEUTS SEG # BLD: 4.3 K/UL (ref 1.8–8)
NEUTS SEG NFR BLD: 65 % (ref 32–75)
NITRITE UR QL STRIP.AUTO: NEGATIVE
NRBC # BLD: 0 K/UL (ref 0–0.01)
NRBC BLD-RTO: 0 PER 100 WBC
PH UR STRIP: 5 [PH] (ref 5–8)
PLATELET # BLD AUTO: 255 K/UL (ref 150–400)
PMV BLD AUTO: 10.1 FL (ref 8.9–12.9)
POTASSIUM SERPL-SCNC: 3.6 MMOL/L (ref 3.5–5.1)
PROT SERPL-MCNC: 8.3 G/DL (ref 6.4–8.2)
PROT UR STRIP-MCNC: NEGATIVE MG/DL
RBC # BLD AUTO: 4.79 M/UL (ref 3.8–5.2)
RBC #/AREA URNS HPF: NORMAL /HPF (ref 0–5)
SODIUM SERPL-SCNC: 137 MMOL/L (ref 136–145)
SP GR UR REFRACTOMETRY: 1.02 (ref 1–1.03)
UA: UC IF INDICATED,UAUC: NORMAL
UROBILINOGEN UR QL STRIP.AUTO: 1 EU/DL (ref 0.2–1)
WBC # BLD AUTO: 6.8 K/UL (ref 3.6–11)
WBC URNS QL MICRO: NORMAL /HPF (ref 0–4)

## 2019-12-29 PROCEDURE — 74011250636 HC RX REV CODE- 250/636: Performed by: EMERGENCY MEDICINE

## 2019-12-29 PROCEDURE — 85025 COMPLETE CBC W/AUTO DIFF WBC: CPT

## 2019-12-29 PROCEDURE — 80053 COMPREHEN METABOLIC PANEL: CPT

## 2019-12-29 PROCEDURE — 96374 THER/PROPH/DIAG INJ IV PUSH: CPT

## 2019-12-29 PROCEDURE — 36415 COLL VENOUS BLD VENIPUNCTURE: CPT

## 2019-12-29 PROCEDURE — 74018 RADEX ABDOMEN 1 VIEW: CPT

## 2019-12-29 PROCEDURE — 81001 URINALYSIS AUTO W/SCOPE: CPT

## 2019-12-29 PROCEDURE — 99284 EMERGENCY DEPT VISIT MOD MDM: CPT

## 2019-12-29 PROCEDURE — 81025 URINE PREGNANCY TEST: CPT

## 2019-12-29 PROCEDURE — 83690 ASSAY OF LIPASE: CPT

## 2019-12-29 RX ORDER — ONDANSETRON 2 MG/ML
4 INJECTION INTRAMUSCULAR; INTRAVENOUS
Status: COMPLETED | OUTPATIENT
Start: 2019-12-29 | End: 2019-12-29

## 2019-12-29 RX ADMIN — ONDANSETRON 4 MG: 2 INJECTION INTRAMUSCULAR; INTRAVENOUS at 18:09

## 2019-12-29 RX ADMIN — SODIUM CHLORIDE 1000 ML: 900 INJECTION, SOLUTION INTRAVENOUS at 18:09

## 2019-12-29 NOTE — ED TRIAGE NOTES
Pt reports N/V/D for the past 4 days. Pt states she noticed bright red blood in her vomit and became concerned due to taking Plavix. Pt has HX of stroke may of this year and has been having burning and pain in left hand and left foot since having the stroke.

## 2019-12-29 NOTE — ED PROVIDER NOTES
46 y.o. female with past medical history significant for CVA (May 2019), HTN, Thalassemia, PTSD, recurrent major depression who presents via personal vehicle with chief complaint of emesis. Pt presents to the ED and reports severe N/V which began on 12/26/19. States that her emesis stopped today but she has continued with nausea and also experienced hematemesis today. Also reports  decreased appetite since 12/25/19 and diarrhea. Prior to today the pt states she has been having about 10 episodes of emesis and 3 episodes of diarrhea daily. States that she feels like she has some kind of blockage. Notes hx of a \"couple strokes\" in May 2019 that she says were due to HTN. Has left sided deficits following the strokes specifically reporting chronic left hand and LLE burning pain. Pt takes anticoagulants and blood pressure medication daily. Is also currently on methadone maintenance therapy. States that she has been able to keep down her regular medications today 12/29/19 but over the past few days has not been able to keep them down. Has tried nauzene with no relief. Pt denies any blood in stool. There are no other acute medical concerns at this time.     Surgical hx - ARIANA & BSO  Social hx - Tobacco use: daily smoker (0.5 packs/day), Alcohol Use: non drinker, Drug Use: denies current use, former intravenous drug user, currently on methadone maintenance therapy    PCP: Jillian Chandra NP    Note written by Barrie Gao, as dictated by Ceci Tirado MD 5:57PM.             Past Medical History:   Diagnosis Date   [de-identified] ex- IVDU (intravenous drug user)     H/O hysterectomy for benign disease     Hypertension     [facility] recurrent [facility]     Methadone maintenance therapy patient ([facility]     Other ill-defined conditions(799.89) ovarian cyst    PTSD (post-traumatic stress disorder)     Thalassemia        Past Surgical History:   Procedure Laterality Date    HX GYN      HX HEENT      t&a    HX ARIANA AND BSO  2009    Endometriosis, Uterine Septum         Family History:   Problem Relation Age of Onset    Hypertension Mother    Huffman Arthritis-rheumatoid Mother     Hypertension Sister     Asthma Sister     Heart Disease Sister     Diabetes Maternal Aunt     Diabetes Maternal Uncle     Other Son         Lactose Intolerance    Asthma Daughter     Other Daughter         Alpha Thalessmia       Social History     Socioeconomic History    Marital status: SINGLE     Spouse name: Not on file    Number of children: Not on file    Years of education: Not on file    Highest education level: Not on file   Occupational History    Not on file   Social Needs    Financial resource strain: Not on file    Food insecurity:     Worry: Not on file     Inability: Not on file    Transportation needs:     Medical: Not on file     Non-medical: Not on file   Tobacco Use    Smoking status: Current Every Day Smoker     Packs/day: 0.50    Smokeless tobacco: Current User   Substance and Sexual Activity    Alcohol use: No    Drug use: No    Sexual activity: Not Currently     Birth control/protection: None   Lifestyle    Physical activity:     Days per week: Not on file     Minutes per session: Not on file    Stress: Not on file   Relationships    Social connections:     Talks on phone: Not on file     Gets together: Not on file     Attends Buddhism service: Not on file     Active member of club or organization: Not on file     Attends meetings of clubs or organizations: Not on file     Relationship status: Not on file    Intimate partner violence:     Fear of current or ex partner: Not on file     Emotionally abused: Not on file     Physically abused: Not on file     Forced sexual activity: Not on file   Other Topics Concern    Not on file   Social History Narrative    Not on file         ALLERGIES: Amlodipine; Hydrochlorothiazide; and Nsaids (non-steroidal anti-inflammatory drug)    Review of Systems Constitutional: Positive for appetite change. Negative for chills and fever. Respiratory: Negative for shortness of breath. Cardiovascular: Negative for chest pain. Gastrointestinal: Positive for diarrhea, nausea and vomiting. Negative for abdominal pain, blood in stool and constipation. Neurological: Negative for dizziness and light-headedness. All other systems reviewed and are negative. Vitals:    12/29/19 1741   BP: 131/87   Pulse: 79   Resp: 16   Temp: 98.1 °F (36.7 °C)   SpO2: 96%   Weight: 52.6 kg (116 lb)   Height: 5' 4\" (1.626 m)            Physical Exam  Vitals signs and nursing note reviewed. Constitutional:       Appearance: Normal appearance. She is well-developed. HENT:      Head: Normocephalic and atraumatic. Eyes:      Pupils: Pupils are equal, round, and reactive to light. Neck:      Musculoskeletal: Normal range of motion and neck supple. Cardiovascular:      Rate and Rhythm: Normal rate and regular rhythm. Pulmonary:      Effort: Pulmonary effort is normal.      Breath sounds: Normal breath sounds. Abdominal:      General: Abdomen is flat. There is no distension. Palpations: Abdomen is soft. Tenderness: There is no tenderness. Skin:     General: Skin is warm and dry. Capillary Refill: Capillary refill takes less than 2 seconds. Neurological:      General: No focal deficit present. Mental Status: She is alert and oriented to person, place, and time. Comments: Burning sensation on the left side   Psychiatric:         Mood and Affect: Mood normal.         Behavior: Behavior normal.      Note written by Barrie Quinones, as dictated by Jessica Segundo MD 5:57 PM    MDM  Number of Diagnoses or Management Options  Nausea, vomiting, and diarrhea:   Diagnosis management comments: The patient is resting comfortably and feels better, is alert and in no distress.  The repeat examination is unremarkable and benign; in particular, there is no discomfort at McBurney's point. The history, exam, diagnostic testing, and current condition do not suggest acute appendicitis, bowel obstruction, incarcerated hernia, acute cholecystitis, bowel perforation, major gastrointestinal bleeding, severe diverticulitis, sepsis, or other significant pathology to warrant further testing, continued ED treatment, admission, or surgical evaluation at this point. The vital signs have been stable and are within normal limits at this time. The patient does not have uncontrollable pain, intractable vomiting, or other significant symptoms. The patient's condition is stable and appropriate for discharge. The patient will pursue further outpatient evaluation with the primary care physician or other designated or consulting physician as indicated in the discharge instructions. Amount and/or Complexity of Data Reviewed  Decide to obtain previous medical records or to obtain history from someone other than the patient: yes           Procedures        The patient's results have been reviewed with them and/or available family. Patient and/or family verbally conveyed their understanding and agreement of the patient's signs, symptoms, diagnosis, treatment and prognosis and additionally agree to follow up as recommended in the discharge instructions or to return to the Emergency Room should their condition change prior to their follow-up appointment. The patient/family verbally agrees with the care-plan and verbally conveys that all of their questions have been answered. The discharge instructions have also been provided to the patient and/or family with some educational information regarding the patient's diagnosis as well a list of reasons why the patient would want to return to the ER prior to their follow-up appointment, should their condition change.

## 2019-12-30 ENCOUNTER — OFFICE VISIT (OUTPATIENT)
Dept: FAMILY MEDICINE CLINIC | Age: 39
End: 2019-12-30

## 2019-12-30 VITALS
BODY MASS INDEX: 19.19 KG/M2 | RESPIRATION RATE: 17 BRPM | SYSTOLIC BLOOD PRESSURE: 143 MMHG | HEART RATE: 70 BPM | WEIGHT: 112.4 LBS | TEMPERATURE: 98.5 F | HEIGHT: 64 IN | OXYGEN SATURATION: 99 % | DIASTOLIC BLOOD PRESSURE: 102 MMHG

## 2019-12-30 DIAGNOSIS — G62.9 NEUROPATHY: Primary | ICD-10-CM

## 2019-12-30 DIAGNOSIS — R11.2 NAUSEA AND VOMITING, INTRACTABILITY OF VOMITING NOT SPECIFIED, UNSPECIFIED VOMITING TYPE: ICD-10-CM

## 2019-12-30 DIAGNOSIS — R19.7 DIARRHEA, UNSPECIFIED TYPE: ICD-10-CM

## 2019-12-30 RX ORDER — PROMETHAZINE HYDROCHLORIDE 25 MG/ML
25 INJECTION, SOLUTION INTRAMUSCULAR; INTRAVENOUS ONCE
Qty: 1 VIAL | Refills: 0
Start: 2019-12-30 | End: 2019-12-30

## 2019-12-30 RX ORDER — PANTOPRAZOLE SODIUM 40 MG/1
40 TABLET, DELAYED RELEASE ORAL DAILY
Qty: 30 TAB | Refills: 1 | Status: SHIPPED | OUTPATIENT
Start: 2019-12-30 | End: 2021-04-16 | Stop reason: SDUPTHER

## 2019-12-30 RX ORDER — ONDANSETRON 8 MG/1
8 TABLET, ORALLY DISINTEGRATING ORAL
Qty: 20 TAB | Refills: 0 | Status: SHIPPED | OUTPATIENT
Start: 2019-12-30 | End: 2022-03-16 | Stop reason: ALTCHOICE

## 2019-12-30 NOTE — PROGRESS NOTES
Chief Complaint   Patient presents with    Vomiting    Nausea    Foot Pain     left     Hand Pain     left     Vomiting Blood     1. Have you been to the ER, urgent care clinic since your last visit? Hospitalized since your last visit? No    2. Have you seen or consulted any other health care providers outside of the [address] [place] [address] since your last visit? Include any pap smears or colon screening.  No

## 2019-12-30 NOTE — ED NOTES
Verbal shift change report given to Kalie Martell RN (oncoming nurse) by Dominguez RN (offgoing nurse). Report included the following information SBAR, ED Summary, Intake/Output, MAR and Recent Results.

## 2020-01-03 ENCOUNTER — OFFICE VISIT (OUTPATIENT)
Dept: FAMILY MEDICINE CLINIC | Age: 40
End: 2020-01-03

## 2020-01-03 VITALS
HEIGHT: 64 IN | SYSTOLIC BLOOD PRESSURE: 142 MMHG | TEMPERATURE: 98.5 F | HEART RATE: 79 BPM | DIASTOLIC BLOOD PRESSURE: 90 MMHG | BODY MASS INDEX: 19.63 KG/M2 | RESPIRATION RATE: 17 BRPM | WEIGHT: 115 LBS | OXYGEN SATURATION: 96 %

## 2020-01-03 DIAGNOSIS — I10 ESSENTIAL HYPERTENSION: Primary | ICD-10-CM

## 2020-01-03 DIAGNOSIS — G62.9 NEUROPATHY: ICD-10-CM

## 2020-01-03 DIAGNOSIS — K52.9 ACUTE GASTROENTERITIS: ICD-10-CM

## 2020-01-03 NOTE — PROGRESS NOTES
Chief Complaint   Patient presents with    Nausea     after eating      1. Have you been to the ER, urgent care clinic since your last visit? Hospitalized since your last visit? No    2. Have you seen or consulted any other health care providers outside of the [address] [place] [address] since your last visit? Include any pap smears or colon screening.  No

## 2020-01-03 NOTE — PATIENT INSTRUCTIONS
Soft-Textured, [place] Diet: Care Instructions  Your Care Instructions    A soft-textured, bland diet is used when you need food that is easy to chew, swallow, and digest. You will need to choose soft foods that are low in spices and seasonings. You will need to avoid high-fat foods, as well as caffeine and alcohol. Your doctor or dietitian can help you plan a soft-textured, bland diet based on your health and what you prefer to eat. Ask your doctor how long you should stay on this diet. As you get better, you will probably be able to go back to a regular diet. Talk with your doctor or dietitian before you make changes in your diet. Follow-up care is a key part of your treatment and safety. Be sure to make and go to all appointments, and call your doctor if you are having problems. It's also a good idea to know your test results and keep a list of the medicines you take. How can you care for yourself at home? · Choose foods that are easy to chew and swallow. Good choices are mashed potatoes, soft breads and rolls, cream soups, oatmeal, and Cream of Wheat. · Choose soft, well-cooked vegetables and soft or canned fruits. Good choices are applesauce, ripe bananas, and non-citrus fruit juice. · Try milk, yogurt, or other milk products, if you can digest dairy without too many problems. Your doctor may limit milk and milk products for a while. If so, he or she may recommend a calcium and vitamin D supplement. · Choose soft protein foods such as eggs, tofu, steamed fish, chicken, and turkey. Slow-cooking methods, such as stewing, will help soften meat. Chopping meat in a  or  also will make it easier to eat. · Avoid nuts, raw vegetables, hard crackers, tough meats, and prunes and prune juice. · Avoid foods that are very spicy, such as foods seasoned with black pepper, chili peppers, horseradish, or hot sauce.   · Avoid highly acidic foods such as citrus fruits, citrus fruit juices, and tomato-based foods. · Avoid high-fat foods such as fried meat, chips, and rich desserts. · Check with your doctor before you drink alcohol or beverages that have caffeine, such as coffee, tea, and cola beverages. Where can you learn more? Go to http://darlin-breann.info/. Enter V312 in the search box to learn more about \"Soft-Textured, [place] Diet: Care Instructions. \"  Current as of: November 7, 2018  Content Version: 12.2  © 1748-4991 [employer]. Care instructions adapted under license by [employer] (which disclaims liability or warranty for this information). If you have questions about a medical condition or this instruction, always ask your healthcare professional. [address] any warranty or liability for your use of this information.

## 2020-01-05 NOTE — PROGRESS NOTES
Assessment/Plan:     Diagnoses and all orders for this visit:    1. Essential hypertension  Continue current therapy. Continue to monitor blood pressures for a goal of 140/90 or less. 2. Neuropathy  Will reach out to Dr. Rashid Rachel for next evaluation. 3. Acute gastroenteritis  Resolving. Continue with bland diet. Follow-up and Dispositions    · Return in about 4 weeks (around 1/31/2020) for Follow Up. Discussed expected course/resolution/complications of diagnosis in detail with patient. Medication risks/benefits/costs/interactions/alternatives discussed with patient. Pt was given after visit summary which includes diagnoses, current medications & vitals. Pt expressed understanding with the diagnosis and plan          Subjective:      Jermain Santos is a 46 y.o. female who presents for had concerns including Nausea (after eating ). Here for follow up. Seen one week ago by Dr. Lele Price for gastroenteritis. Reports symptoms have essentially resolved. Reports some minimal ongoing limited appetite. She is tolerating a bland diet. She continues with uncontrolled neuropathy of the left upper and lower extremity. Failed gabapentin and cymbalta. Followed by Dr. Rashid Rachel, neurology. She is compliant with treatment for essential hypertension. She does not monitor home pressures. Patient Active Problem List   Diagnosis Code    Essential hypertension I10    Alpha thalassemia ([facility] D56.0    Chronic hepatitis C without hepatic coma ([address] B18.2    Nephrolithiasis N20.0    TIA (transient ischemic attack) G45.9    CVA (cerebral vascular accident) ([address] I63.9    Opioid dependence in remission ([address] F11.21    Tobacco abuse Z72.0    CVA, old, alterations of sensations I69.398, R20.9       Current Outpatient Medications   Medication Sig Dispense Refill    ondansetron (ZOFRAN ODT) 8 mg disintegrating tablet Take 1 Tab by mouth every eight (8) hours as needed for Nausea.  20 Tab 0    pantoprazole (PROTONIX) 40 mg tablet Take 1 Tab by mouth daily. 30 Tab 1    gabapentin (NEURONTIN) 300 mg capsule Take 1 Cap by mouth three (3) times daily as needed for Pain. Max Daily Amount: 900 mg. 90 Cap 2    aspirin 81 mg chewable tablet TAKE 1 TABLET BY MOUTH EVERY DAY 30 Tab 11    losartan (COZAAR) 100 mg tablet TAKE 1 TABLET BY MOUTH EVERY DAY 30 Tab 11    pravastatin (PRAVACHOL) 40 mg tablet TAKE 1 TABLET BY MOUTH EVERY DAY AT NIGHT 30 Tab 11    clopidogrel (PLAVIX) 75 mg tab TAKE 1 TABLET BY MOUTH EVERY DAY 30 Tab 11    diclofenac (VOLTAREN) 1 % gel Apply 2 g to affected area four (4) times daily. 1 Each 6    carvedilol (COREG) 12.5 mg tablet Take 1 Tab by mouth two (2) times daily (with meals). 180 Tab 1    methadone (DOLOPHINE) 10 mg tablet Take 8 Tabs by mouth daily. Max Daily Amount: 80 mg. 1 Tab 0       Allergies   Allergen Reactions    Amlodipine Swelling     Leg swelling    Hydrochlorothiazide Other (comments)     Intolerance: polyuria    Nsaids (Non-Steroidal Anti-Inflammatory Drug) Unknown (comments)     Dr. Eugenie Haji told me no NSAID's because of hypertension problems. ROS:   Review of Systems   Constitutional: Negative for malaise/fatigue. Eyes: Negative for blurred vision. Respiratory: Negative for shortness of breath. Cardiovascular: Negative for chest pain. Gastrointestinal: Negative for diarrhea, nausea and vomiting. Neurological: Positive for sensory change. Objective:     Visit Vitals  /90   Pulse 79   Temp 98.5 °F (36.9 °C) (Oral)   Resp 17   Ht 5' 4\" (1.626 m)   Wt 115 lb (52.2 kg)   SpO2 96%   BMI 19.74 kg/m²       Vitals and Nurse Documentation reviewed. Physical Exam  Constitutional:       General: She is not in acute distress. HENT:      Right Ear: No middle ear effusion. Tympanic membrane is not erythematous or bulging. Left Ear:  No middle ear effusion. Tympanic membrane is not erythematous or bulging.       Nose: No rhinorrhea. Right Sinus: No maxillary sinus tenderness or frontal sinus tenderness. Left Sinus: No maxillary sinus tenderness or frontal sinus tenderness. Mouth/Throat:      Pharynx: No oropharyngeal exudate or posterior oropharyngeal erythema. Eyes:      General: Lids are normal.   Cardiovascular:      Heart sounds: S1 normal and S2 normal. No murmur. No friction rub. No gallop. Pulmonary:      Breath sounds: Normal breath sounds. No wheezing. Abdominal:      Tenderness: There is no tenderness. Lymphadenopathy:      Cervical: No cervical adenopathy. Skin:     General: Skin is warm and dry.    Psychiatric:         Mood and Affect: Mood and affect normal.

## 2020-01-10 ENCOUNTER — TELEPHONE (OUTPATIENT)
Dept: FAMILY MEDICINE CLINIC | Age: 40
End: 2020-01-10

## 2020-01-10 NOTE — TELEPHONE ENCOUNTER
----- Message from Hailey Tapia sent at 1/10/2020  9:51 AM EST -----  Regarding: NP [facility]/Telephone      Patient was seen last week and has a burning sensation on left side, left hand and foot. Wants to follow up on pain management referral.  Wants to know when she can expect to start therapy. Best contact number is [phone].

## 2020-01-10 NOTE — TELEPHONE ENCOUNTER
I did not hear back from dr. Dk Zuniga. We may want to ask what Dr. Tim Pedroza recommends or have patient call dr. Tim Pedroza to discuss.

## 2020-01-13 ENCOUNTER — TELEPHONE (OUTPATIENT)
Dept: FAMILY MEDICINE CLINIC | Age: 40
End: 2020-01-13

## 2020-01-13 ENCOUNTER — TELEPHONE (OUTPATIENT)
Dept: NEUROLOGY | Age: 40
End: 2020-01-13

## 2020-01-13 DIAGNOSIS — R20.0 NUMBNESS: Primary | ICD-10-CM

## 2020-01-13 NOTE — TELEPHONE ENCOUNTER
----- Message from Esvin Chinchilla sent at 1/13/2020  8:01 AM EST -----  Regarding: KINGSLEY Hawkins/TELEPHONE  Contact: [phone]  General Message    Caller's first and last name:Antoine Olmstead-BOYFRIEND (FULLY AUTHORIZED)      Reason for call: patient is to have a referral for pain management for her hands and foot which are burning extremely back      Callback required yes/no and why: yes, patient never received a callback regarding this issues and she's hurting      Best contact number(s): [phone]      Details to clarify the request: Patient had two strokes last may she is now experiencing terrible burrning sensations in her hand and foot.  I read the messages and Dr. Woodard Najjar or Dr. Hailey Castillo they are waiting on their reply    Esvin Chinchilla

## 2020-01-13 NOTE — TELEPHONE ENCOUNTER
Dr. Garza Needs, patient did not start Cymbalta due to side effects when previously taking it. Her Abiola Helton NP PCP recommended an EMG. Please advise.

## 2020-01-13 NOTE — TELEPHONE ENCOUNTER
----- Message from Edilberto Collins sent at 1/13/2020  8:14 AM EST -----  Regarding: GOLDEN Hale/TELEPHONE  Contact: [phone]  General Message    Caller's first and last name: Teddie Severs who has full auth      Reason for call: patient is in a lot of patient in her hand and foot      Callback required yes/no and why: yes, because she is having and extreme burning sensation in her hand and foot      Best contact number(s): [phone]      Details to clarify the request: Patient's has been waiting on a referral for pain management for the extreme burning sensation she is having in her hand and foot. She is in a lot of pain and no one is getting in contact with her regarding starting the pain management to help her feel better.  The patient needs a call today      Edilberto Collins

## 2020-01-13 NOTE — TELEPHONE ENCOUNTER
Outbound call to patient. Patient made aware that provider has not heard back from Dr. Tammi Ibrahim, and may want to follow up with Dr. Carlota Jennings to discuss. Patient would verbalized understanding and will give Dr. Saravia Shown office a call.

## 2020-01-15 NOTE — TELEPHONE ENCOUNTER
----- Message from Manisha Oneil sent at 1/15/2020 12:07 PM EST -----  Regarding: Dr. Justin Jovel Telephone  Contact: [phone]  General Message/Vendor Calls    Caller's first and last name: Juan Lee,        Reason for call:  He advised the pain management doctor she was referred to does not accept  her insurance. He stated if possible could he get another referral to another pain management doctor that  takes [facility] plus insurance. He stated he would like to get a call back today. Callback required yes/no and why: Yes, to speak with him about another pain management doctor covered under her insurance.       Best contact number(s): [phone]      Manisha Oneil

## 2020-01-16 NOTE — TELEPHONE ENCOUNTER
Returning call to [facility] [place] patients  at [phone] no answer left a message for a return call to office.

## 2020-01-16 NOTE — TELEPHONE ENCOUNTER
Spoke with Harsh Martinez who is on Patients PHI. Patient given more providers for pain management. Also advised to call insurance to see who is in network for pain management. Patients fiance verbalized understanding.

## 2020-02-03 ENCOUNTER — TELEPHONE (OUTPATIENT)
Dept: NEUROLOGY | Age: 40
End: 2020-02-03

## 2020-02-03 NOTE — TELEPHONE ENCOUNTER
----- Message from Isaias Calderon sent at 2/3/2020 12:26 PM EST -----  Regarding: Dr. Bethel Schaefer first and last name:Pt      Reason for call: Requesting to reschedule 02/03/2020 procedure.        Callback required yes/no and why:Yes      Best contact number(s):[phone]      Details to clarify the request:      Isaias Calderon

## 2020-02-10 ENCOUNTER — OFFICE VISIT (OUTPATIENT)
Dept: NEUROLOGY | Age: 40
End: 2020-02-10

## 2020-02-10 DIAGNOSIS — R20.2 NUMBNESS AND TINGLING IN LEFT ARM: ICD-10-CM

## 2020-02-10 DIAGNOSIS — R20.0 NUMBNESS AND TINGLING IN LEFT ARM: ICD-10-CM

## 2020-02-10 NOTE — PROGRESS NOTES
[address] [facility] Neurology [facility]  [address]  Phone [phone] Fax [phone]  Test Date:  2/10/2020    Patient: Belenda Apley : 1980 Physician: Nancy Salgado MD   Sex: Female Height: 5' 4\" Ref Phys: Nancy Salgado MD   ID#: [id number] Weight: 115 lbs. Technician: Pranav Santos. .EMG TECH     Patient Complaints:  LUE    Patient History / Exam:  18-year-old female with history of stroke who is being evaluated for persistent numbness and tingling in the left upper more than lower extremity. On exam: Alert and fully oriented. Cranial nerves II through XII intact. Muscle tone and bulk normal. Strength normal in all extremities. DTRs 2/2 and symmetric. Toes downgoing. Sensation intact. Gait steady. NCV & EMG Findings:  All nerve conduction studies were within normal limits. All F Wave latencies were within normal limits. All examined muscles (as indicated in the following table) showed no evidence of electrical instability.       Impression:    The electrodiagnostic testing is normal.  There is no evidence to suggest an entrapment mononeuropathy or cervical radiculopathy on the left.        ___________________________  Nancy Salgado MD        Nerve Conduction Studies  Anti Sensory Summary Table     Stim Site NR Peak (ms) Norm Peak (ms) P-T Amp (µV) Norm P-T Amp Onset (ms) Site1 Site2 Delta-P (ms) Dist (cm) Popeye (m/s) Norm Popeye (m/s)   Left Median Anti Sensory (2nd Digit)  32.7°C   Wrist    3.0 <3.6 47.1 >10 2.3 Wrist 2nd Digit 3.0 14.0 47 >39   Left Radial Anti Sensory (Base 1st Digit)  33°C   Wrist    2.0 <3.1 67.5  1.4 Wrist Base 1st Digit 2.0 10.0 50    Left Ulnar Anti Sensory (5th Digit)  32.9°C   Wrist    2.9 <3.7 43.6 >15.0 2.2 Wrist 5th Digit 2.9 14.0 48 >38     Motor Summary Table     Stim Site NR Onset (ms) Norm Onset (ms) O-P Amp (mV) Norm O-P Amp Site1 Site2 Delta-0 (ms) Dist (cm) Popeye (m/s) Norm Popeye (m/s)   Left Median Motor (Abd Poll Brev)  32.5°C   Wrist    2.9 <4.2 9.1 >5 Elbow Wrist 4.0 22.0 55 >50   Elbow    6.9  8.4          Left Ulnar Motor (Abd Dig Minimi)  33.6°C   Wrist    2.5 <4.2 9.4 >3 B Elbow Wrist 3.4 20.0 59 >53   B Elbow    5.9  8.1  A Elbow B Elbow 1.9 10.0 53 >53   A Elbow    7.8  7.4            Comparison Summary Table     Stim Site NR Peak (ms) Norm Peak (ms) P-T Amp (µV) Site1 Site2 Delta-P (ms) Norm Delta (ms)   Left Median/Ulnar Palm Comparison (Wrist - 8cm)  33.1°C   Median Palm    1.9 <2.5 91.1 Median Palm Ulnar Palm 0.3 <0.3   Ulnar Palm    1.6 <2.5 18.7         F Wave Studies     NR F-Lat (ms) Lat Norm (ms) L-R F-Lat (ms) L-R Lat Norm   Left Ulnar (Mrkrs) (Abd Dig Min)  33.5°C      26.86 <36  <2.5     EMG     Side Muscle Nerve Root Ins Act Fibs Psw Amp Dur Poly Recrt Int Pat Comment   Left 1stDorInt Ulnar C8-T1 Nml Nml Nml Nml Nml 0 Nml Nml    Left BrachioRad Radial C5-6 Nml Nml Nml Nml Nml 0 Nml Nml    Left PronatorTeres Median C6-7 Nml Nml Nml Nml Nml 0 Nml Nml    Left Biceps Musculocut C5-6 Nml Nml Nml Nml Nml 0 Nml Nml    Left Triceps Radial C6-7-8 Nml Nml Nml Nml Nml 0 Nml Nml    Left Deltoid Axillary C5-6 Nml Nml Nml Nml Nml 0 Nml Nml          Waveforms:

## 2020-02-18 ENCOUNTER — TELEPHONE (OUTPATIENT)
Dept: NEUROLOGY | Age: 40
End: 2020-02-18

## 2020-02-18 DIAGNOSIS — R20.2 NUMBNESS AND TINGLING IN LEFT ARM: Primary | ICD-10-CM

## 2020-02-18 DIAGNOSIS — I69.398 CVA, OLD, ALTERATIONS OF SENSATIONS: ICD-10-CM

## 2020-02-18 DIAGNOSIS — R20.0 NUMBNESS AND TINGLING IN LEFT ARM: Primary | ICD-10-CM

## 2020-02-18 DIAGNOSIS — R20.9 CVA, OLD, ALTERATIONS OF SENSATIONS: ICD-10-CM

## 2020-02-18 RX ORDER — PREGABALIN 150 MG/1
150 CAPSULE ORAL 2 TIMES DAILY
Qty: 60 CAP | Refills: 3 | Status: SHIPPED | OUTPATIENT
Start: 2020-02-18 | End: 2020-08-10

## 2020-02-18 NOTE — TELEPHONE ENCOUNTER
Pt's  would like Lyrica called in because gabapentin is not working. He is requesting the pt be called back. Local pharmacy verified as [facility] [address]

## 2020-02-24 ENCOUNTER — TELEPHONE (OUTPATIENT)
Dept: NEUROLOGY | Age: 40
End: 2020-02-24

## 2020-02-24 NOTE — TELEPHONE ENCOUNTER
Gini ([facility] verified) calling because he was told pt's Lyrica requires a PA.  He left pt's number as a call back [phone]

## 2020-02-24 NOTE — TELEPHONE ENCOUNTER
Returned phone call and s/w patient to let her know that pregabalin does not require a PA per [place]asta. I made a call to the local pharmacy to let them know that they just need to enter the override for the POS rejections. [facility] was able to get a paid claim.

## 2020-06-02 ENCOUNTER — TELEPHONE (OUTPATIENT)
Dept: FAMILY MEDICINE CLINIC | Age: 40
End: 2020-06-02

## 2020-06-02 RX ORDER — VALSARTAN 160 MG/1
160 TABLET ORAL DAILY
Qty: 30 TAB | Refills: 3 | Status: SHIPPED | OUTPATIENT
Start: 2020-06-02 | End: 2020-12-24 | Stop reason: SDUPTHER

## 2020-07-17 ENCOUNTER — VIRTUAL VISIT (OUTPATIENT)
Dept: FAMILY MEDICINE CLINIC | Age: 40
End: 2020-07-17

## 2020-07-17 DIAGNOSIS — Z86.73 HISTORY OF CVA (CEREBROVASCULAR ACCIDENT): ICD-10-CM

## 2020-07-17 DIAGNOSIS — G43.809 OTHER MIGRAINE WITHOUT STATUS MIGRAINOSUS, NOT INTRACTABLE: Primary | ICD-10-CM

## 2020-07-17 DIAGNOSIS — I10 HTN, GOAL BELOW 140/90: ICD-10-CM

## 2020-07-17 NOTE — PROGRESS NOTES
Yan Real, who was evaluated through a synchronous (real-time) audio-video encounter, and/or her healthcare decision maker, is aware that it is a billable service, with coverage as determined by her insurance carrier. She provided verbal consent to proceed: YES, and patient identification was verified. It was conducted pursuant to the emergency declaration under the [facility], [facility] authority and the [employer] and [employer] Act. A caregiver was present when appropriate. Ability to conduct physical exam was limited. I was at home. The patient was at home. This virtual visit was conducted via [employer]. Pursuant to the emergency declaration under the [address] [place] [address], [address] [place] [address] authority and the [employer] and [employer] Act, this Virtual  Visit was conducted to reduce the patient's risk of exposure to COVID-19 and provide continuity of care for an established patient. Services were provided through a video synchronous discussion virtually to substitute for in-person clinic visit. Due to this being a TeleHealth evaluation, many elements of the physical examination are unable to be assessed. Total Time: minutes: 11-20 minutes. Angelito Souza MD    712  Subjective:   Yan Real was seen for No chief complaint on file. Had a stroke last May; left side of body    Prior to Admission medications    Medication Sig Start Date End Date Taking? Authorizing Provider   pregabalin (LYRICA) 150 mg capsule Take 1 Cap by mouth two (2) times a day. Max Daily Amount: 300 mg. 2/18/20  Yes Christiano Gomes MD   carvediloL (COREG) 12.5 mg tablet TAKE 1 TABLET BY MOUTH TWICE A DAY WITH MEALS 1/16/20  Yes Mahesh Dee MD   pantoprazole (PROTONIX) 40 mg tablet Take 1 Tab by mouth daily.  12/30/19  Yes Manolo Morin MD   aspirin 81 mg chewable tablet TAKE 1 TABLET BY MOUTH EVERY DAY 12/2/19  Yes Deniz Melendrez NP   pravastatin (PRAVACHOL) 40 mg tablet TAKE 1 TABLET BY MOUTH EVERY DAY AT NIGHT 12/2/19  Yes Deniz Melendrez NP   clopidogrel (PLAVIX) 75 mg tab TAKE 1 TABLET BY MOUTH EVERY DAY 12/2/19  Yes Deniz Melendrez NP   methadone (DOLOPHINE) 10 mg tablet Take 8 Tabs by mouth daily. Max Daily Amount: 80 mg. 11/3/17  Yes Milka Duncan MD   valsartan (DIOVAN) 160 mg tablet Take 1 Tab by mouth daily. 6/2/20   Deniz Melendrez NP   ondansetron (ZOFRAN ODT) 8 mg disintegrating tablet Take 1 Tab by mouth every eight (8) hours as needed for Nausea. 12/30/19   Jen Hutchins MD   gabapentin (NEURONTIN) 300 mg capsule Take 1 Cap by mouth three (3) times daily as needed for Pain. Max Daily Amount: 900 mg. 12/23/19   Raissa Sigala MD   diclofenac (VOLTAREN) 1 % gel Apply 2 g to affected area four (4) times daily. 8/2/19   Gregory Reyes MD       Allergies   Allergen Reactions    Amlodipine Swelling     Leg swelling    Hydrochlorothiazide Other (comments)     Intolerance: polyuria    Nsaids (Non-Steroidal Anti-Inflammatory Drug) Unknown (comments)     Dr. Doris Cook told me no NSAID's because of hypertension problems. ROS      Physical Exam:     There were no vitals taken for this visit. General: alert, cooperative, no distress   Mental  status: normal mood, behavior, speech, dress, motor activity, and thought processes, able to follow commands   HENT: NCAT   Neck: no visualized mass   Resp: no respiratory distress   Neuro: no gross deficits   Skin: no discoloration or lesions of concern on visible areas   Psychiatric: normal affect, consistent with stated mood, no evidence of hallucinations       Assessment & Plan:   June Clayton is a 46 y.o. female who presents today for:    1. Other migraine without status migrainosus, not intractable  Now resolved and pt at neuro baseline.  Possibly due to HTN but had similar symptoms with prior CVA. Encouraged pt to reach out to neurology today to recap her episode; defer to their management if additional outpatient work up needed. Discussed with pt that if symptoms were to reoccur, recommend immediate ER evaluation. Reviewed signs and symptoms that would indicate a worsening medical condition which would require immediate evaluation and treatment; patient expressed understanding of plan. 2. History of CVA (cerebrovascular accident)  Continues to have upper extremity numbness from prior CVA; follow up with neuro. 3. HTN, goal below 140/90  Recommend pt to obtain home BP cuff and report back values; may need additional BP titration. There are no discontinued medications. Treatment risks/benefits/costs/interactions/alternatives discussed with patient. Advised patient to call back or return to office if symptoms worsen/change/persist. If patient cannot reach us or should anything more severe/urgent arise he/she should proceed directly to the nearest emergency department. Discussed expected course/resolution/complications of diagnosis in detail with patient. Patient expressed understanding with the diagnosis and plan. Luis Antonio Vaz M.D.

## 2020-08-07 ENCOUNTER — TELEPHONE (OUTPATIENT)
Dept: NEUROLOGY | Facility: CLINIC | Age: 40
End: 2020-08-07

## 2020-08-07 NOTE — TELEPHONE ENCOUNTER
Patient stated her pain has gotten worse over the past two weeks. None of the medication prescribed has helped. The patient is also willing to come in for an APPT or have a VV.      Please let me know    Thanks,  Kel Graff

## 2020-08-07 NOTE — TELEPHONE ENCOUNTER
Pt is having burning sensation in left hand and foot. It is getting much worse and she isn't able to take the pain.   Please call

## 2020-08-08 DIAGNOSIS — I69.398 CVA, OLD, ALTERATIONS OF SENSATIONS: ICD-10-CM

## 2020-08-08 DIAGNOSIS — R20.9 CVA, OLD, ALTERATIONS OF SENSATIONS: ICD-10-CM

## 2020-08-08 DIAGNOSIS — R20.2 NUMBNESS AND TINGLING IN LEFT ARM: ICD-10-CM

## 2020-08-08 DIAGNOSIS — R20.0 NUMBNESS AND TINGLING IN LEFT ARM: ICD-10-CM

## 2020-08-10 RX ORDER — PREGABALIN 150 MG/1
CAPSULE ORAL
Qty: 60 CAP | Refills: 3 | Status: SHIPPED | OUTPATIENT
Start: 2020-08-10 | End: 2020-08-17 | Stop reason: ALTCHOICE

## 2020-08-12 ENCOUNTER — TELEPHONE (OUTPATIENT)
Dept: NEUROLOGY | Facility: CLINIC | Age: 40
End: 2020-08-12

## 2020-08-12 NOTE — TELEPHONE ENCOUNTER
Message from [facility] below, please advise on where to schedule pt.     ----- Message from John Oliveira sent at 8/12/2020 12:32 PM EDT -----  Regarding: Dr. Emelina Macdonald Message/Vendor Calls    Caller's first and last name: Pt      Reason for call: Requesting an appt for pt due to discomfort before 09/09/2020      Callback required yes/no and why: Yes      Best contact number(s): [phone]      Details to clarify the request:      John Oliveira

## 2020-12-24 NOTE — TELEPHONE ENCOUNTER
Last Visit: VV 7/17/20  MD Raymond Severe  Next Appointment: Not rescheduled- 1 patient cancellation and 1 no show (Aug)  Need labs/appt  Previous Refill Encounter(s):   Pravastatin 12/2/19 30 + 11  Valsartan 6/2/20 30 + 3    Requested Prescriptions     Pending Prescriptions Disp Refills    pravastatin (PRAVACHOL) 40 mg tablet 30 Tab 0     Sig: Take 1 Tab by mouth nightly. Appointment/labs due for further refills!  valsartan (DIOVAN) 160 mg tablet 30 Tab 0     Sig: Take 1 Tab by mouth daily. Appointment/labs due for further refills!

## 2020-12-24 NOTE — TELEPHONE ENCOUNTER
Additional requests: Added appt. and labs due for further refills. Last Visit: VV 7/17/20  MD Beverley Russell  Next Appointment: Not rescheduled- 1 patient cancellation and 1 no show (Aug)  Need labs/appt  Previous Refill Encounter(s):   Clopidogrel 12/2/19 30 + 11  Asa 12/2/19 30 + 11    Requested Prescriptions     Pending Prescriptions Disp Refills    clopidogreL (PLAVIX) 75 mg tab 30 Tab 0     Sig: Take 1 Tab by mouth daily. Labs/appt due for further refills!  aspirin 81 mg chewable tablet 30 Tab 0     Sig: Take 1 Tab by mouth daily. Appt/labs due for further refills!

## 2020-12-29 RX ORDER — GUAIFENESIN 100 MG/5ML
81 LIQUID (ML) ORAL DAILY
Qty: 30 TAB | Refills: 0 | Status: SHIPPED | OUTPATIENT
Start: 2020-12-29 | End: 2021-03-03 | Stop reason: SDUPTHER

## 2020-12-29 RX ORDER — PRAVASTATIN SODIUM 40 MG/1
40 TABLET ORAL
Qty: 30 TAB | Refills: 0 | Status: SHIPPED | OUTPATIENT
Start: 2020-12-29 | End: 2021-03-03 | Stop reason: SDUPTHER

## 2020-12-29 RX ORDER — VALSARTAN 160 MG/1
160 TABLET ORAL DAILY
Qty: 30 TAB | Refills: 0 | Status: SHIPPED | OUTPATIENT
Start: 2020-12-29 | End: 2021-03-03 | Stop reason: SDUPTHER

## 2020-12-29 RX ORDER — CLOPIDOGREL BISULFATE 75 MG/1
75 TABLET ORAL DAILY
Qty: 30 TAB | Refills: 0 | Status: SHIPPED | OUTPATIENT
Start: 2020-12-29 | End: 2021-03-03 | Stop reason: SDUPTHER

## 2021-02-11 ENCOUNTER — PATIENT MESSAGE (OUTPATIENT)
Dept: FAMILY MEDICINE CLINIC | Age: 41
End: 2021-02-11

## 2021-03-03 RX ORDER — DICLOFENAC SODIUM 10 MG/G
2 GEL TOPICAL 4 TIMES DAILY
Qty: 1 EACH | Refills: 6 | Status: CANCELLED | OUTPATIENT
Start: 2021-03-03

## 2021-03-03 NOTE — TELEPHONE ENCOUNTER
----- Message from Rain Hinojosa sent at 3/3/2021  1:12 PM EST -----  Regarding: STEPHANE Melendrez / Telephone  Medication Refill    Caller (if not patient): N/A      Relationship of caller (if not patient): N/A      Best contact number(s): [phone]      Name of medication and dosage if known:   \"Clavix\" 75 mg   \"Pravastatin\" 40 mg tablets   \"Losartan-Potassium\" 100 mg   \"Valsartan\" 150 mg   \"Aspirin\" 81 mg chewable tablets   \"Carbedilol\" 12.5 mg tablets      Is patient out of this medication (yes/no): no, will run out on Friday      Ph[facility] [address] listed in chart? (yes/no): yes   Pharmacy phone number: in chart      Details to clarify the request: Pt is scheduled for first available VV on Tuesday 3/9/2021, but is requesting a temporary refill because she will run out of medications on Friday. Pt requests a call back to confirm if medications can be temporarily filled or not so that she can be prepared.       Rain Hinojosa

## 2021-03-03 NOTE — TELEPHONE ENCOUNTER
Last visit 07/17/2020 Virtual visit MD Jannie Park   Next appointment 03/09/2021 STEPHANE Melendrez   Previous refill encounter(s)   12/29/2020:   - Pravachol #30,   - Diovan #30,   - Plavix #30,   - Aspirin Chew #30,   01/16/2020 Coreg #180 with 1 refill     Requested Prescriptions     Pending Prescriptions Disp Refills    valsartan (DIOVAN) 160 mg tablet 30 Tab 0     Sig: Take 1 Tab by mouth daily. Appointment/labs due for further refills!  pravastatin (PRAVACHOL) 40 mg tablet 30 Tab 0     Sig: Take 1 Tab by mouth nightly. Appointment/labs due for further refills!  carvediloL (COREG) 12.5 mg tablet 60 Tab 0     Sig: Take 1 Tab by mouth two (2) times daily (with meals).  aspirin 81 mg chewable tablet 30 Tab 0     Sig: Take 1 Tab by mouth daily. Appt/labs due for further refills!  clopidogreL (PLAVIX) 75 mg tab 30 Tab 0     Sig: Take 1 Tab by mouth daily. Labs/appt due for further refills!

## 2021-03-06 RX ORDER — CLOPIDOGREL BISULFATE 75 MG/1
75 TABLET ORAL DAILY
Qty: 30 TAB | Refills: 0 | Status: SHIPPED | OUTPATIENT
Start: 2021-03-06 | End: 2021-04-16 | Stop reason: SDUPTHER

## 2021-03-06 RX ORDER — VALSARTAN 160 MG/1
160 TABLET ORAL DAILY
Qty: 30 TAB | Refills: 0 | Status: SHIPPED | OUTPATIENT
Start: 2021-03-06 | End: 2021-04-16 | Stop reason: SDUPTHER

## 2021-03-06 RX ORDER — CARVEDILOL 12.5 MG/1
12.5 TABLET ORAL 2 TIMES DAILY WITH MEALS
Qty: 60 TAB | Refills: 0 | Status: SHIPPED | OUTPATIENT
Start: 2021-03-06 | End: 2021-04-16 | Stop reason: SDUPTHER

## 2021-03-06 RX ORDER — GUAIFENESIN 100 MG/5ML
81 LIQUID (ML) ORAL DAILY
Qty: 30 TAB | Refills: 0 | Status: SHIPPED | OUTPATIENT
Start: 2021-03-06 | End: 2021-04-16 | Stop reason: SDUPTHER

## 2021-03-06 RX ORDER — PRAVASTATIN SODIUM 40 MG/1
40 TABLET ORAL
Qty: 30 TAB | Refills: 0 | Status: SHIPPED | OUTPATIENT
Start: 2021-03-06 | End: 2021-04-16 | Stop reason: SDUPTHER

## 2021-03-08 ENCOUNTER — APPOINTMENT (OUTPATIENT)
Dept: FAMILY MEDICINE CLINIC | Age: 41
End: 2021-03-08

## 2021-03-11 LAB
APPEARANCE UR: CLEAR
BACTERIA #/AREA URNS HPF: ABNORMAL /[HPF]
BACTERIA UR CULT: ABNORMAL
BILIRUB UR QL STRIP: NEGATIVE
CASTS URNS QL MICRO: ABNORMAL /LPF
COLOR UR: YELLOW
EPI CELLS #/AREA URNS HPF: ABNORMAL /HPF (ref 0–10)
GLUCOSE UR QL: NEGATIVE
HGB UR QL STRIP: NEGATIVE
KETONES UR QL STRIP: NEGATIVE
LEUKOCYTE ESTERASE UR QL STRIP: ABNORMAL
MICRO URNS: ABNORMAL
NITRITE UR QL STRIP: POSITIVE
PH UR STRIP: 5.5 [PH] (ref 5–7.5)
PROT UR QL STRIP: ABNORMAL
RBC #/AREA URNS HPF: ABNORMAL /HPF (ref 0–2)
SP GR UR: 1.02 (ref 1–1.03)
URINALYSIS REFLEX, 377202: ABNORMAL
UROBILINOGEN UR STRIP-MCNC: 0.2 MG/DL (ref 0.2–1)
WBC #/AREA URNS HPF: >30 /HPF (ref 0–5)

## 2021-03-12 ENCOUNTER — VIRTUAL VISIT (OUTPATIENT)
Dept: FAMILY MEDICINE CLINIC | Age: 41
End: 2021-03-12
Payer: MEDICAID

## 2021-03-12 DIAGNOSIS — N30.00 ACUTE CYSTITIS WITHOUT HEMATURIA: Primary | ICD-10-CM

## 2021-03-12 PROCEDURE — 99212 OFFICE O/P EST SF 10 MIN: CPT | Performed by: NURSE PRACTITIONER

## 2021-03-12 RX ORDER — NITROFURANTOIN 25; 75 MG/1; MG/1
100 CAPSULE ORAL 2 TIMES DAILY
Qty: 14 CAP | Refills: 0 | Status: SHIPPED | OUTPATIENT
Start: 2021-03-12 | End: 2021-06-08 | Stop reason: ALTCHOICE

## 2021-03-12 NOTE — PROGRESS NOTES
Assessment/Plan:     Diagnoses and all orders for this visit:    1. Acute cystitis without hematuria  -     nitrofurantoin, macrocrystal-monohydrate, (MACROBID) 100 mg capsule; Take 1 Cap by mouth two (2) times a day. Will treat presumptively as above. Follow-up in office for evaluation if no improvement. Continue to push fluids adequately. I have encouraged her to remain an active participant of her recovery to decrease her likelihood for relapse. She appears precontemplative at this time. Follow-up and Dispositions    · Return in about 3 months (around 6/12/2021) for Complete Physical.         Discussed expected course/resolution/complications of diagnosis in detail with patient. Medication risks/benefits/costs/interactions/alternatives discussed with patient. Pt was given after visit summary which includes diagnoses, current medications & vitals. Pt expressed understanding with the diagnosis and plan      The patient has consented for synchronous (real-time) Telemedicine (audio-video technology) on 3/12/2021 for their care to be delivered over telemedicine in place of their regularly scheduled office visit pursuant to the emergency declaration under the [address] and the [address] authority and the [employer] and [employer] Act, this Virtual  Visit was conducted by the practitioner who is located in the medical office in [place], [place], with patient's consent, to reduce the patient's risk of exposure to COVID-19 and provide continuity of care. Visit Length: 10 to 19 minutes    Subjective:      Huy Hackett is a 45 y.o. female who presents for had concerns including Urinary Frequency. She has discontinued methadone 5 weeks ago. She continues to remain sober from opioids at this time. She is not currently involved in rehabilitation. Reports a 1 week history of urinary discomfort, frequency, urgency.   Symptoms are worsening over time. There is no associated fever, chills, body aches, nausea, vomiting. She denies a history of frequent UTI. She has not attempted OTC treatment. This is her first evaluation. Patient Active Problem List   Diagnosis Code    Essential hypertension I10    Alpha thalassemia ([facility] D56.0    Chronic [facility] C without hepatic coma [facility] B18.2    Nephrolithiasis N20.0    TIA (transient ischemic attack) G45.9    CVA (cerebral vascular accident) ([facility] I63.9    Opioid dependence in remission ([facility] F11.21    Tobacco abuse Z72.0    CVA, old, alterations of sensations I69.398, R20.9       Current Outpatient Medications   Medication Sig Dispense Refill    nitrofurantoin, macrocrystal-monohydrate, (MACROBID) 100 mg capsule Take 1 Cap by mouth two (2) times a day. 14 Cap 0    valsartan (DIOVAN) 160 mg tablet Take 1 Tab by mouth daily. Appointment/labs due for further refills! 30 Tab 0    pravastatin (PRAVACHOL) 40 mg tablet Take 1 Tab by mouth nightly. Appointment/labs due for further refills! 30 Tab 0    carvediloL (COREG) 12.5 mg tablet Take 1 Tab by mouth two (2) times daily (with meals). 60 Tab 0    aspirin 81 mg chewable tablet Take 1 Tab by mouth daily. Appt/labs due for further refills! 30 Tab 0    clopidogreL (PLAVIX) 75 mg tab Take 1 Tab by mouth daily. Labs/appt due for further refills! 30 Tab 0    pantoprazole (PROTONIX) 40 mg tablet Take 1 Tab by mouth daily. 30 Tab 1    ondansetron (ZOFRAN ODT) 8 mg disintegrating tablet Take 1 Tab by mouth every eight (8) hours as needed for Nausea. 20 Tab 0    gabapentin (NEURONTIN) 300 mg capsule Take 1 Cap by mouth three (3) times daily as needed for Pain. Max Daily Amount: 900 mg. 90 Cap 2    diclofenac (VOLTAREN) 1 % gel Apply 2 g to affected area four (4) times daily. 1 Each 6    methadone (DOLOPHINE) 10 mg tablet Take 8 Tabs by mouth daily.  Max Daily Amount: 80 mg. 1 Tab 0       Allergies   Allergen Reactions    Amlodipine Swelling     Leg swelling    Hydrochlorothiazide Other (comments)     Intolerance: polyuria    Nsaids (Non-Steroidal Anti-Inflammatory Drug) Unknown (comments)     Dr. Madelin Rosen told me no NSAID's because of hypertension problems. ROS:   Review of Systems   Constitutional: Negative for malaise/fatigue. Eyes: Negative for blurred vision. Respiratory: Negative for shortness of breath. Cardiovascular: Negative for chest pain. Gastrointestinal: Negative for nausea and vomiting. Genitourinary: Positive for dysuria, frequency and urgency. Negative for flank pain and hematuria. Objective: There were no vitals taken for this visit. Vitals and Nurse Documentation reviewed. Physical Exam  Constitutional:       Appearance: Normal appearance. Neurological:      Mental Status: She is alert.    Psychiatric:         Attention and Perception: Attention normal.         Mood and Affect: Mood normal.         Speech: Speech normal.         Behavior: Behavior normal.         Cognition and Memory: Cognition normal.

## 2021-03-21 NOTE — PROGRESS NOTES
Patient with E. coli in urine patient is susceptible  to Macrobid.   If you should have any more symptoms please let me know

## 2021-04-06 ENCOUNTER — TELEPHONE (OUTPATIENT)
Dept: FAMILY MEDICINE CLINIC | Age: 41
End: 2021-04-06

## 2021-04-06 NOTE — TELEPHONE ENCOUNTER
Via fax [employer] requested physician name and ICD-10 code for Mari 03/08/21. Dr Gemini Banks provided updated dx code N39.0, RB#[id number]. Denisa Madera .faxed back to [employer] for medical billing @ [phone].

## 2021-04-16 DIAGNOSIS — R11.2 NAUSEA AND VOMITING, INTRACTABILITY OF VOMITING NOT SPECIFIED, UNSPECIFIED VOMITING TYPE: ICD-10-CM

## 2021-04-16 NOTE — TELEPHONE ENCOUNTER
NP Aneesh,     Patient call and needs refills of all her medications. Has appt 6/8/21. Updated rx's to 90 d/s if appropriate to that date. More to follow. Thanks, Eri    Last Visit: VV 3/12/21 STEPHANE Melendrez  Next Appointment: 6/8/21 STEPHANE Melendrez  Previous Refill Encounter(s): 3/6/21 (all)    Requested Prescriptions     Pending Prescriptions Disp Refills    aspirin 81 mg chewable tablet 90 Tab 0     Sig: Take 1 Tab by mouth daily. Appt/labs due for further refills!  carvediloL (COREG) 12.5 mg tablet 180 Tab 0     Sig: Take 1 Tab by mouth two (2) times daily (with meals).  clopidogreL (PLAVIX) 75 mg tab 90 Tab 0     Sig: Take 1 Tab by mouth daily. Labs/appt due for further refills!

## 2021-04-17 RX ORDER — CARVEDILOL 12.5 MG/1
12.5 TABLET ORAL 2 TIMES DAILY WITH MEALS
Qty: 180 TAB | Refills: 3 | Status: SHIPPED | OUTPATIENT
Start: 2021-04-17 | End: 2022-08-31

## 2021-04-17 RX ORDER — PANTOPRAZOLE SODIUM 40 MG/1
40 TABLET, DELAYED RELEASE ORAL DAILY
Qty: 90 TAB | Refills: 3 | Status: SHIPPED | OUTPATIENT
Start: 2021-04-17 | End: 2022-03-16 | Stop reason: ALTCHOICE

## 2021-04-17 RX ORDER — CLOPIDOGREL BISULFATE 75 MG/1
75 TABLET ORAL DAILY
Qty: 90 TAB | Refills: 3 | Status: SHIPPED | OUTPATIENT
Start: 2021-04-17 | End: 2022-05-04

## 2021-04-17 RX ORDER — GUAIFENESIN 100 MG/5ML
81 LIQUID (ML) ORAL DAILY
Qty: 90 TAB | Refills: 3 | Status: SHIPPED | OUTPATIENT
Start: 2021-04-17 | End: 2022-05-04

## 2021-04-17 RX ORDER — PRAVASTATIN SODIUM 40 MG/1
40 TABLET ORAL
Qty: 90 TAB | Refills: 3 | Status: SHIPPED | OUTPATIENT
Start: 2021-04-17 | End: 2022-07-05 | Stop reason: SDUPTHER

## 2021-04-17 RX ORDER — VALSARTAN 160 MG/1
160 TABLET ORAL DAILY
Qty: 90 TAB | Refills: 3 | Status: SHIPPED | OUTPATIENT
Start: 2021-04-17 | End: 2021-06-08 | Stop reason: ALTCHOICE

## 2021-06-08 ENCOUNTER — OFFICE VISIT (OUTPATIENT)
Dept: FAMILY MEDICINE CLINIC | Age: 41
End: 2021-06-08
Payer: MEDICAID

## 2021-06-08 VITALS
TEMPERATURE: 98 F | BODY MASS INDEX: 19.63 KG/M2 | HEIGHT: 64 IN | RESPIRATION RATE: 16 BRPM | WEIGHT: 115 LBS | HEART RATE: 78 BPM | DIASTOLIC BLOOD PRESSURE: 80 MMHG | SYSTOLIC BLOOD PRESSURE: 122 MMHG | OXYGEN SATURATION: 98 %

## 2021-06-08 DIAGNOSIS — Z00.00 ROUTINE GENERAL MEDICAL EXAMINATION AT A HEALTH CARE FACILITY: ICD-10-CM

## 2021-06-08 DIAGNOSIS — Z13.220 SCREENING, LIPID: ICD-10-CM

## 2021-06-08 DIAGNOSIS — Z72.0 TOBACCO ABUSE: ICD-10-CM

## 2021-06-08 DIAGNOSIS — G44.201 ACUTE INTRACTABLE TENSION-TYPE HEADACHE: ICD-10-CM

## 2021-06-08 DIAGNOSIS — I63.89 CEREBROVASCULAR ACCIDENT (CVA) DUE TO OTHER MECHANISM (HCC): Primary | ICD-10-CM

## 2021-06-08 PROBLEM — G45.9 TIA (TRANSIENT ISCHEMIC ATTACK): Status: RESOLVED | Noted: 2019-05-06 | Resolved: 2021-06-08

## 2021-06-08 PROBLEM — R20.9 CVA, OLD, ALTERATIONS OF SENSATIONS: Status: RESOLVED | Noted: 2019-10-31 | Resolved: 2021-06-08

## 2021-06-08 PROBLEM — I69.398 CVA, OLD, ALTERATIONS OF SENSATIONS: Status: RESOLVED | Noted: 2019-10-31 | Resolved: 2021-06-08

## 2021-06-08 PROBLEM — Z86.19 HEPATITIS C VIRUS INFECTION CURED AFTER ANTIVIRAL DRUG THERAPY: Status: ACTIVE | Noted: 2018-02-22

## 2021-06-08 PROCEDURE — 99395 PREV VISIT EST AGE 18-39: CPT | Performed by: NURSE PRACTITIONER

## 2021-06-08 RX ORDER — PREGABALIN 150 MG/1
150 CAPSULE ORAL 2 TIMES DAILY
COMMUNITY

## 2021-06-08 NOTE — PROGRESS NOTES
Chief Complaint   Patient presents with    Complete Physical    Headache     constant head pain for one week     Medication Evaluation     valsartan:  stopped taking due to causing abdominal pain      1. Have you been to the ER, urgent care clinic since your last visit? Hospitalized since your last visit? No    2. Have you seen or consulted any other health care providers outside of the [address] [place] [address] since your last visit? Include any pap smears or colon screening.  No    Visit Vitals  /80 (BP 1 Location: Left arm, BP Patient Position: Sitting, BP Cuff Size: Adult)   Pulse 78   Temp 98 °F (36.7 °C) (Temporal)   Resp 16   Ht 5' 4\" (1.626 m)   Wt 115 lb (52.2 kg)   SpO2 98%   BMI 19.74 kg/m²

## 2021-06-08 NOTE — PROGRESS NOTES
Assessment/ Plan:   Full age appropriate History and Physical exam as well as health care maintenance  performed and discussed today. Risk factor modification discussed today includes safe sex practices, healthy diet and exercise, and seat belt use. Continue current medications. Diagnoses and all orders for this visit:    1. Cerebrovascular accident (CVA) due to other mechanism ([facility]  -     LIPID PANEL; Future    2. Routine general medical examination at a health care facility  -     METABOLIC PANEL, COMPREHENSIVE; Future  -     CBC WITH AUTOMATED DIFF; Future  -     TSH 3RD GENERATION; Future  -     VITAMIN D, 25 HYDROXY; Future    3. Screening, lipid  -     LIPID PANEL; Future    4. Tobacco abuse  Strongly encouraged cessation. 5. Acute intractable tension-type headache  She will contact neurology for evaluation. No neurological components today, however she is also not a candidate for NSAIDS or Triptans with history of CVA. ER if symptoms worsen. She declines scheduling of COVID vaccination at this time. Follow-up and Dispositions    · Return in about 1 year (around 6/8/2022) for Complete Physical.          Discussed expected course/resolution/complications of diagnosis in detail with patient.    Medication risks/benefits/costs/interactions/alternatives discussed with patient.    Pt was given after visit summary which includes diagnoses, current medications & vitals. Pt expressed understanding with the diagnosis and plan      HPI:   Shelby Servin is a 45 y.o. female who presents for annual exam.      Cardiovascular Review:  The patient has hypertension, hyperlipidemia, tobacco abuse,  and CVA. Diet and Lifestyle: not attempting to follow a low fat, low cholesterol diet  Home BP Monitoring: is not measured at home. Pertinent ROS: taking medications as instructed, no medication side effects noted, no TIA's, no chest pain on exertion, no dyspnea on exertion, no swelling of ankles. Previous ARIANA/BSO secondary to endometriosis. Oral estrogens were discontinued at a previous visit due to CVA risk. No longer a candidate for pap smears. Reports weaning off methadone earlier this year. She is not connected to a rehabilitation program at this time. Reports an intractable headache for one week. Symptoms include aching pain at the right lateral aspect of the head. Denies visual change, nausea, vomiting, weakness. Has attempted otc tylenol without improvement. Endorses stress. History of migraines. Current Outpatient Medications   Medication Sig Dispense Refill    pregabalin (Lyrica) 150 mg capsule Take 150 mg by mouth two (2) times a day.  aspirin 81 mg chewable tablet Take 1 Tab by mouth daily. 90 Tab 3    carvediloL (COREG) 12.5 mg tablet Take 1 Tab by mouth two (2) times daily (with meals). 180 Tab 3    clopidogreL (PLAVIX) 75 mg tab Take 1 Tab by mouth daily. 90 Tab 3    pantoprazole (PROTONIX) 40 mg tablet Take 1 Tab by mouth daily. 90 Tab 3    pravastatin (PRAVACHOL) 40 mg tablet Take 1 Tab by mouth nightly. 90 Tab 3    ondansetron (ZOFRAN ODT) 8 mg disintegrating tablet Take 1 Tab by mouth every eight (8) hours as needed for Nausea. 20 Tab 0    diclofenac (VOLTAREN) 1 % gel Apply 2 g to affected area four (4) times daily. 1 Each 6    gabapentin (NEURONTIN) 300 mg capsule Take 1 Cap by mouth three (3) times daily as needed for Pain. Max Daily Amount: 900 mg. (Patient not taking: Reported on 6/8/2021) 90 Cap 2      Allergies   Allergen Reactions    Amlodipine Swelling     Leg swelling    Hydrochlorothiazide Other (comments)     Intolerance: polyuria    Nsaids (Non-Steroidal Anti-Inflammatory Drug) Unknown (comments)     Dr. Carissa Oneil told me no NSAID's because of hypertension problems.        Patient Active Problem List   Diagnosis Code    Essential hypertension I10    Alpha thalassemia ([facility] D56.0    Hepatitis C virus infection cured after antiviral drug therapy Z86.19    Nephrolithiasis N20.0    CVA (cerebral vascular accident) ([facility] I63.9    Opioid dependence in remission ([facility] F11.21    Tobacco abuse Z72.0     Past Medical History:   Diagnosis Date    ex- IVDU (intravenous drug user)     H/O hysterectomy for benign disease     Hypertension     Major depression, recurrent ([facility]     Methadone maintenance therapy patient ([facility]     Other ill-defined conditions(799.89) ovarian cyst    PTSD (post-traumatic stress disorder)     Thalassemia       Past Surgical History:   Procedure Laterality Date    HX GYN      HX HEENT      t&a    HX ARIANA AND BSO  2009    Endometriosis, Uterine Septum      No LMP recorded. Patient has had a hysterectomy.    Family History   Problem Relation Age of Onset    Hypertension Mother    [facility] Arthritis-rheumatoid Mother     Hypertension Sister     Asthma Sister     Heart Disease Sister     Diabetes Maternal Aunt     Diabetes Maternal Uncle     Other Son         Lactose Intolerance    Asthma Daughter     Other Daughter         Alpha Thalessmia      Social History     Socioeconomic History    Marital status: SINGLE     Spouse name: Not on file    Number of children: Not on file    Years of education: Not on file    Highest education level: Not on file   Occupational History    Not on file   Tobacco Use    Smoking status: Current Every Day Smoker     Packs/day: 0.50    Smokeless tobacco: Current User   Vaping Use    Vaping Use: Never used   Substance and Sexual Activity    Alcohol use: No    Drug use: No    Sexual activity: Not Currently     Birth control/protection: None   Other Topics Concern    Not on file   Social History Narrative    Not on file     Social Determinants of Health     Financial Resource Strain:     Difficulty of Paying Living Expenses:    Food Insecurity:     Worried About Running Out of Food in the Last Year:     Genie of Food in the Last Year:    Transportation Needs:     Lack of Transportation (Medical):  Lack of Transportation (Non-Medical):    Physical Activity:     Days of Exercise per Week:     Minutes of Exercise per Session:    Stress:     Feeling of Stress :    Social Connections:     Frequency of Communication with Friends and Family:     Frequency of Social Gatherings with Friends and Family:     Attends Yazidism Services:     Active Member of Clubs or Organizations:     Attends Club or Organization Meetings:     Marital Status:    Intimate Partner Violence:     Fear of Current or Ex-Partner:     Emotionally Abused:     Physically Abused:     Sexually Abused:         ROS:     Review of Systems   Constitutional: Negative for fever, malaise/fatigue and weight loss. HENT: Negative for hearing loss. Eyes: Negative for blurred vision and pain. Respiratory: Negative for cough and shortness of breath. Cardiovascular: Negative for chest pain, palpitations and leg swelling. Gastrointestinal: Negative for abdominal pain, blood in stool, constipation, diarrhea and melena. Genitourinary: Negative for dysuria and hematuria. Musculoskeletal: Negative for joint pain. Skin: Negative for rash. Neurological: Positive for headaches. Psychiatric/Behavioral: Negative for depression. The patient is not nervous/anxious and does not have insomnia. Physical Exam:     Visit Vitals  /80 (BP 1 Location: Left arm, BP Patient Position: Sitting, BP Cuff Size: Adult)   Pulse 78   Temp 98 °F (36.7 °C) (Temporal)   Resp 16   Ht 5' 4\" (1.626 m)   Wt 115 lb (52.2 kg)   SpO2 98%   BMI 19.74 kg/m²        Nursing Documentation and Vital Signs Reviewed. Physical Exam  Constitutional:       General: She is not in acute distress. HENT:      Right Ear: No drainage. No middle ear effusion. Tympanic membrane is not injected, erythematous or bulging. Left Ear: No drainage. No middle ear effusion. Tympanic membrane is not injected, erythematous or bulging.    Eyes: Pupils: Pupils are equal, round, and reactive to light. Neck:      Trachea: No tracheal deviation. Cardiovascular:      Pulses:           Dorsalis pedis pulses are 2+ on the right side and 2+ on the left side. Posterior tibial pulses are 2+ on the right side and 2+ on the left side. Heart sounds: S1 normal and S2 normal. No murmur heard. No friction rub. No gallop. Pulmonary:      Breath sounds: Normal breath sounds. No wheezing. Abdominal:      General: Bowel sounds are normal. There is no distension. Palpations: Abdomen is soft. There is no mass. Tenderness: There is no abdominal tenderness. Lymphadenopathy:      Cervical: No cervical adenopathy. Skin:     General: Skin is warm and dry. Neurological:      Cranial Nerves: No cranial nerve deficit. Sensory: Sensation is intact. Motor: Motor function is intact.

## 2021-06-08 NOTE — PATIENT INSTRUCTIONS
When are you eligible for COVID vaccination? Sign up with the link below to be notified. Vaccinate. [place].gov Ready to Schedule? Call [phone]. The Pfizer vaccine will be offered for our current [facility] patients ages 14-23 by appointment only at the primary care pediatric offices listed below. Please call today to schedule an appointment.    
 
 
 
[facility] INPATIENT REHABILITATION of [address] Manas Payne from [facility] of [address] 
          [address], [address] from 1-3pm

## 2021-06-09 LAB
25(OH)D3+25(OH)D2 SERPL-MCNC: 38.4 NG/ML (ref 30–100)
ALBUMIN SERPL-MCNC: 4.3 G/DL (ref 3.8–4.8)
ALBUMIN/GLOB SERPL: 1.5 {RATIO} (ref 1.2–2.2)
ALP SERPL-CCNC: 93 IU/L (ref 48–121)
ALT SERPL-CCNC: 10 IU/L (ref 0–32)
AST SERPL-CCNC: 17 IU/L (ref 0–40)
BASOPHILS # BLD AUTO: 0 X10E3/UL (ref 0–0.2)
BASOPHILS NFR BLD AUTO: 1 %
BILIRUB SERPL-MCNC: 0.4 MG/DL (ref 0–1.2)
BUN SERPL-MCNC: 21 MG/DL (ref 6–20)
BUN/CREAT SERPL: 22 (ref 9–23)
CALCIUM SERPL-MCNC: 9.9 MG/DL (ref 8.7–10.2)
CHLORIDE SERPL-SCNC: 105 MMOL/L (ref 96–106)
CHOLEST SERPL-MCNC: 155 MG/DL (ref 100–199)
CO2 SERPL-SCNC: 25 MMOL/L (ref 20–29)
CREAT SERPL-MCNC: 0.94 MG/DL (ref 0.57–1)
EOSINOPHIL # BLD AUTO: 0.1 X10E3/UL (ref 0–0.4)
EOSINOPHIL NFR BLD AUTO: 1 %
ERYTHROCYTE [DISTWIDTH] IN BLOOD BY AUTOMATED COUNT: 16.7 % (ref 11.7–15.4)
GLOBULIN SER CALC-MCNC: 2.8 G/DL (ref 1.5–4.5)
GLUCOSE SERPL-MCNC: 87 MG/DL (ref 65–99)
HCT VFR BLD AUTO: 44.1 % (ref 34–46.6)
HDLC SERPL-MCNC: 50 MG/DL
HGB BLD-MCNC: 14.1 G/DL (ref 11.1–15.9)
IMM GRANULOCYTES # BLD AUTO: 0 X10E3/UL (ref 0–0.1)
IMM GRANULOCYTES NFR BLD AUTO: 0 %
IMP & REVIEW OF LAB RESULTS: NORMAL
LDLC SERPL CALC-MCNC: 90 MG/DL (ref 0–99)
LYMPHOCYTES # BLD AUTO: 1.5 X10E3/UL (ref 0.7–3.1)
LYMPHOCYTES NFR BLD AUTO: 32 %
MCH RBC QN AUTO: 27.5 PG (ref 26.6–33)
MCHC RBC AUTO-ENTMCNC: 32 G/DL (ref 31.5–35.7)
MCV RBC AUTO: 86 FL (ref 79–97)
MONOCYTES # BLD AUTO: 0.5 X10E3/UL (ref 0.1–0.9)
MONOCYTES NFR BLD AUTO: 10 %
NEUTROPHILS # BLD AUTO: 2.6 X10E3/UL (ref 1.4–7)
NEUTROPHILS NFR BLD AUTO: 56 %
PLATELET # BLD AUTO: 220 X10E3/UL (ref 150–450)
POTASSIUM SERPL-SCNC: 4.5 MMOL/L (ref 3.5–5.2)
PROT SERPL-MCNC: 7.1 G/DL (ref 6–8.5)
RBC # BLD AUTO: 5.13 X10E6/UL (ref 3.77–5.28)
SODIUM SERPL-SCNC: 143 MMOL/L (ref 134–144)
TRIGL SERPL-MCNC: 81 MG/DL (ref 0–149)
TSH SERPL DL<=0.005 MIU/L-ACNC: 1.03 UIU/ML (ref 0.45–4.5)
VLDLC SERPL CALC-MCNC: 15 MG/DL (ref 5–40)
WBC # BLD AUTO: 4.6 X10E3/UL (ref 3.4–10.8)

## 2021-06-18 ENCOUNTER — APPOINTMENT (OUTPATIENT)
Dept: CT IMAGING | Age: 41
End: 2021-06-18
Attending: EMERGENCY MEDICINE
Payer: MEDICAID

## 2021-06-18 ENCOUNTER — HOSPITAL ENCOUNTER (EMERGENCY)
Age: 41
Discharge: HOME | End: 2021-06-18
Attending: EMERGENCY MEDICINE
Payer: MEDICAID

## 2021-06-18 VITALS
TEMPERATURE: 98.2 F | OXYGEN SATURATION: 98 % | DIASTOLIC BLOOD PRESSURE: 106 MMHG | RESPIRATION RATE: 16 BRPM | SYSTOLIC BLOOD PRESSURE: 158 MMHG | HEART RATE: 85 BPM

## 2021-06-18 DIAGNOSIS — R51.9 ACUTE NONINTRACTABLE HEADACHE, UNSPECIFIED HEADACHE TYPE: Primary | ICD-10-CM

## 2021-06-18 DIAGNOSIS — R03.0 ELEVATED BLOOD PRESSURE READING: ICD-10-CM

## 2021-06-18 LAB
ALBUMIN SERPL-MCNC: 3.8 G/DL (ref 3.5–5)
ALBUMIN/GLOB SERPL: 0.8 {RATIO} (ref 1.1–2.2)
ALP SERPL-CCNC: 100 U/L (ref 45–117)
ALT SERPL-CCNC: 12 U/L (ref 12–78)
ANION GAP SERPL CALC-SCNC: 7 MMOL/L (ref 5–15)
AST SERPL-CCNC: 10 U/L (ref 15–37)
ATRIAL RATE: 72 BPM
BASOPHILS # BLD: 0 K/UL (ref 0–0.1)
BASOPHILS NFR BLD: 1 % (ref 0–1)
BILIRUB SERPL-MCNC: 0.5 MG/DL (ref 0.2–1)
BUN SERPL-MCNC: 14 MG/DL (ref 6–20)
BUN/CREAT SERPL: 17 (ref 12–20)
CALCIUM SERPL-MCNC: 9.7 MG/DL (ref 8.5–10.1)
CALCULATED P AXIS, ECG09: 60 DEGREES
CALCULATED R AXIS, ECG10: 33 DEGREES
CALCULATED T AXIS, ECG11: 11 DEGREES
CHLORIDE SERPL-SCNC: 108 MMOL/L (ref 97–108)
CO2 SERPL-SCNC: 27 MMOL/L (ref 21–32)
COMMENT, HOLDF: NORMAL
CREAT SERPL-MCNC: 0.83 MG/DL (ref 0.55–1.02)
DIAGNOSIS, 93000: NORMAL
DIFFERENTIAL METHOD BLD: ABNORMAL
EOSINOPHIL # BLD: 0.1 K/UL (ref 0–0.4)
EOSINOPHIL NFR BLD: 2 % (ref 0–7)
ERYTHROCYTE [DISTWIDTH] IN BLOOD BY AUTOMATED COUNT: 18.6 % (ref 11.5–14.5)
GLOBULIN SER CALC-MCNC: 4.5 G/DL (ref 2–4)
GLUCOSE SERPL-MCNC: 88 MG/DL (ref 65–100)
HCT VFR BLD AUTO: 43.6 % (ref 35–47)
HGB BLD-MCNC: 14 G/DL (ref 11.5–16)
IMM GRANULOCYTES # BLD AUTO: 0 K/UL (ref 0–0.04)
IMM GRANULOCYTES NFR BLD AUTO: 0 % (ref 0–0.5)
LYMPHOCYTES # BLD: 2 K/UL (ref 0.8–3.5)
LYMPHOCYTES NFR BLD: 32 % (ref 12–49)
MCH RBC QN AUTO: 27.5 PG (ref 26–34)
MCHC RBC AUTO-ENTMCNC: 32.1 G/DL (ref 30–36.5)
MCV RBC AUTO: 85.5 FL (ref 80–99)
MONOCYTES # BLD: 0.3 K/UL (ref 0–1)
MONOCYTES NFR BLD: 5 % (ref 5–13)
NEUTS SEG # BLD: 3.7 K/UL (ref 1.8–8)
NEUTS SEG NFR BLD: 60 % (ref 32–75)
NRBC # BLD: 0 K/UL (ref 0–0.01)
NRBC BLD-RTO: 0 PER 100 WBC
P-R INTERVAL, ECG05: 136 MS
PLATELET # BLD AUTO: 275 K/UL (ref 150–400)
PMV BLD AUTO: 11.1 FL (ref 8.9–12.9)
POTASSIUM SERPL-SCNC: 3.6 MMOL/L (ref 3.5–5.1)
PROT SERPL-MCNC: 8.3 G/DL (ref 6.4–8.2)
Q-T INTERVAL, ECG07: 382 MS
QRS DURATION, ECG06: 82 MS
QTC CALCULATION (BEZET), ECG08: 418 MS
RBC # BLD AUTO: 5.1 M/UL (ref 3.8–5.2)
SAMPLES BEING HELD,HOLD: NORMAL
SODIUM SERPL-SCNC: 142 MMOL/L (ref 136–145)
VENTRICULAR RATE, ECG03: 72 BPM
WBC # BLD AUTO: 6.1 K/UL (ref 3.6–11)

## 2021-06-18 PROCEDURE — 96374 THER/PROPH/DIAG INJ IV PUSH: CPT

## 2021-06-18 PROCEDURE — 36415 COLL VENOUS BLD VENIPUNCTURE: CPT

## 2021-06-18 PROCEDURE — 80053 COMPREHEN METABOLIC PANEL: CPT

## 2021-06-18 PROCEDURE — 70450 CT HEAD/BRAIN W/O DYE: CPT

## 2021-06-18 PROCEDURE — 74011250636 HC RX REV CODE- 250/636: Performed by: EMERGENCY MEDICINE

## 2021-06-18 PROCEDURE — 74011000258 HC RX REV CODE- 258: Performed by: EMERGENCY MEDICINE

## 2021-06-18 PROCEDURE — 85025 COMPLETE CBC W/AUTO DIFF WBC: CPT

## 2021-06-18 PROCEDURE — 93005 ELECTROCARDIOGRAM TRACING: CPT

## 2021-06-18 PROCEDURE — 99285 EMERGENCY DEPT VISIT HI MDM: CPT

## 2021-06-18 PROCEDURE — 74011250637 HC RX REV CODE- 250/637: Performed by: EMERGENCY MEDICINE

## 2021-06-18 RX ORDER — CARVEDILOL 12.5 MG/1
12.5 TABLET ORAL
Status: COMPLETED | OUTPATIENT
Start: 2021-06-18 | End: 2021-06-18

## 2021-06-18 RX ADMIN — METOCLOPRAMIDE 20 MG: 5 INJECTION, SOLUTION INTRAMUSCULAR; INTRAVENOUS at 14:02

## 2021-06-18 RX ADMIN — CARVEDILOL 12.5 MG: 12.5 TABLET, FILM COATED ORAL at 14:53

## 2021-06-18 NOTE — ED NOTES
Pt still not in room. D/c paperwork at desk. Unsure if IV had been D/c by patient herself. MD aware.  Charge RN aware

## 2021-06-18 NOTE — ED TRIAGE NOTES
Pt presents to department with a CC of headache x2 weeks that became worse last night and kept her from sleep. Pt also reports photophobia with throbbing headache. Pt states \"I've had a stroke so they can't give me any meds. \" Pt sees neurologist at [facility] and is supposed to have an MRI scheduled.

## 2021-06-18 NOTE — ED PROVIDER NOTES
HPI .  Patient has a history of depression, methadone treatment, posttraumatic stress disorder, hypertension, and hysterectomy. Patient reports having a stroke that left her with some left-sided weakness that improved a lot. She says she has neuropathy in her left hand and left foot. Patient reports having a constant right-sided headache for 2 weeks. She went to see her physician 3 days ago and she says her blood pressure was \"fine\". She reports compliance with her blood pressure medications although she cannot name any of them.     Past Medical History:   Diagnosis Date    ex- IVDU (intravenous drug user)     H/O hysterectomy for benign disease     Hypertension     [facility], [facility]     Methadone maintenance therapy patient ([facility]     Other ill-defined conditions(799.89) ovarian cyst    PTSD (post-traumatic stress disorder)     Thalassemia        Past Surgical History:   Procedure Laterality Date    HX GYN      HX HEENT      t&a    HX ARIANA AND BSO  2009    Endometriosis, Uterine Septum         Family History:   Problem Relation Age of Onset    Hypertension Mother    [facility] Arthritis-rheumatoid Mother     Hypertension Sister     Asthma Sister     Heart Disease Sister     Diabetes Maternal Aunt     Diabetes Maternal Uncle     Other Son         Lactose Intolerance    Asthma Daughter     Other Daughter         Alpha Thalessmia       Social History     Socioeconomic History    Marital status: SINGLE     Spouse name: Not on file    Number of children: Not on file    Years of education: Not on file    Highest education level: Not on file   Occupational History    Not on file   Tobacco Use    Smoking status: Current Every Day Smoker     Packs/day: 0.50    Smokeless tobacco: Current User   Vaping Use    Vaping Use: Never used   Substance and Sexual Activity    Alcohol use: No    Drug use: No    Sexual activity: Not Currently     Birth control/protection: None   Other Topics Concern    Not on file   Social History Narrative    Not on file     Social Determinants of Health     Financial Resource Strain:     Difficulty of Paying Living Expenses:    Food Insecurity:     Worried About Running Out of Food in the Last Year:     [address] in the Last Year:    Transportation Needs:     Lack of Transportation (Medical):  Lack of Transportation (Non-Medical):    Physical Activity:     Days of Exercise per Week:     Minutes of Exercise per Session:    Stress:     Feeling of Stress :    Social Connections:     Frequency of Communication with Friends and Family:     Frequency of Social Gatherings with Friends and Family:     Attends Rastafari Services:     Active Member of Clubs or Organizations:     Attends Club or Organization Meetings:     Marital Status:    Intimate Partner Violence:     Fear of Current or Ex-Partner:     Emotionally Abused:     Physically Abused:     Sexually Abused: ALLERGIES: Amlodipine, Hydrochlorothiazide, and Nsaids (non-steroidal anti-inflammatory drug)    Review of Systems   All other systems reviewed and are negative. Vitals:    06/18/21 1213 06/18/21 1314   BP: (!) 192/135 (!) 190/116   Pulse: 85    Resp: 16    Temp: 98.2 °F (36.8 °C)    SpO2: 100% 98%            Physical Exam  Vitals and nursing note reviewed. Constitutional:       Appearance: She is well-developed. HENT:      Head: Normocephalic and atraumatic. Eyes:      Pupils: Pupils are equal, round, and reactive to light. Cardiovascular:      Rate and Rhythm: Normal rate and regular rhythm. Heart sounds: Normal heart sounds. No murmur heard. No friction rub. No gallop. Pulmonary:      Effort: Pulmonary effort is normal. No respiratory distress. Breath sounds: No wheezing or rales. Abdominal:      Palpations: Abdomen is soft. Tenderness: There is no abdominal tenderness. There is no rebound. Musculoskeletal:         General: No tenderness. Normal range of motion. Cervical back: Normal range of motion and neck supple. Skin:     Findings: No erythema. Neurological:      Mental Status: She is alert. Cranial Nerves: No cranial nerve deficit. Comments: Motor; symmetric; finger-nose-finger normal;   Psychiatric:         Behavior: Behavior normal.          MDM       Procedures      Note: Patient reports compliance with her medications. Only blood pressure medication appears to be Coreg. She has not taken her medications today and she usually takes the Coreg in the morning. Headache is completely gone after Reglan. Blood pressure is still elevated 165/110 range. She  had a unilateral headache that was typical of her migraines. We will give her Coreg in the ER and if her CT scan is negative she will be discharged. She will return to the emergency department if her headache recurs. She will continue her medications at home. She will follow up with her primary care doctor in 3 days on Monday.   Chon Hannon MD  2:52 PM

## 2021-06-18 NOTE — ED NOTES
Went in room to remove pt IV and prepare for dc. Pt and family/friend no longer in room. Belongings no longer in room.  MD notified

## 2021-07-07 ENCOUNTER — TELEPHONE (OUTPATIENT)
Dept: FAMILY MEDICINE CLINIC | Age: 41
End: 2021-07-07

## 2021-07-07 NOTE — TELEPHONE ENCOUNTER
----- Message from Sandra Caldwell sent at 7/7/2021 11:17 AM EDT -----  Regarding: Ashley Melendrez, NP/telephone  General Message/Vendor Calls    Caller's first and last name: Aiden Martinez          Reason for call: Requested a back       Callback required yes/no and why:  Yes       Best contact number(s):  [phone]      Details to clarify the request: Mr. Bird Dougherty stated that his girlfriend has a stroke in [place] due to her BP being high. She's on BP medication to keep her BP from being high but lately her BP has been very high and we scheduled her the the first available appointment an August 24 th but he would like to know if she could get fitted in sooner because her BP is to high and she hasn't been feeling well.        April [address]

## 2021-07-07 NOTE — TELEPHONE ENCOUNTER
Returned call to patients BF. Patients name and  verified. He clarified, that patient stated that her BP has been running elevated a few times this week but it is because her anxiety. Patient is also having kidney pain. Offered sooner appt with another provider which was declined. Advised that since she did not want to see another provider in office. I would recommend going to urgent care for eval of kidney pain. He was appreciative of call and expressed understanding.

## 2021-07-16 ENCOUNTER — TELEPHONE (OUTPATIENT)
Dept: FAMILY MEDICINE CLINIC | Age: 41
End: 2021-07-16

## 2021-07-27 ENCOUNTER — TELEPHONE (OUTPATIENT)
Dept: FAMILY MEDICINE CLINIC | Age: 41
End: 2021-07-27

## 2021-07-27 NOTE — TELEPHONE ENCOUNTER
Called, spoke to pt. Two pt identifiers confirmed. Patient stated that she has tried Enemas, Suppository Miralax , greens and eating fruit, and also drinking water and detox. No BM x 8 days.  Please Advise

## 2021-07-27 NOTE — TELEPHONE ENCOUNTER
Pt's  called and said he would like the Pt to be seen sooner than 8/18/21. Pt had not have a bowel movement over a week and BP elevated.       BCB# [phone]

## 2021-07-29 NOTE — TELEPHONE ENCOUNTER
Brittni Shen (Self) [phone]     Pt  called again very concerned about his wife. Would like wife to be called back today to discuss. Please call back when available. Pt  did not know any of the BP readings and stated she has been having migraines. Please call back and advise.

## 2021-07-29 NOTE — TELEPHONE ENCOUNTER
TC to Patient. ID verified. Calling to advise per LARRY,NP she needs to go to ER for Eval and rule out bowl obstruction. Patient agrees to plan .

## 2021-07-31 LAB — SARS-COV-2, NAA: NEGATIVE

## 2021-12-20 ENCOUNTER — APPOINTMENT (OUTPATIENT)
Dept: GENERAL RADIOLOGY | Age: 41
End: 2021-12-20
Attending: STUDENT IN AN ORGANIZED HEALTH CARE EDUCATION/TRAINING PROGRAM
Payer: MEDICAID

## 2021-12-20 ENCOUNTER — HOSPITAL ENCOUNTER (EMERGENCY)
Age: 41
Discharge: HOME | End: 2021-12-20
Attending: STUDENT IN AN ORGANIZED HEALTH CARE EDUCATION/TRAINING PROGRAM | Admitting: STUDENT IN AN ORGANIZED HEALTH CARE EDUCATION/TRAINING PROGRAM
Payer: MEDICAID

## 2021-12-20 VITALS
BODY MASS INDEX: 19.74 KG/M2 | SYSTOLIC BLOOD PRESSURE: 175 MMHG | HEIGHT: 64 IN | DIASTOLIC BLOOD PRESSURE: 134 MMHG | TEMPERATURE: 97.5 F | RESPIRATION RATE: 18 BRPM | HEART RATE: 78 BPM

## 2021-12-20 DIAGNOSIS — S13.4XXA WHIPLASH INJURY SYNDROME, INITIAL ENCOUNTER: ICD-10-CM
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
## 2021-12-20 PROCEDURE — 74011250637 HC RX REV CODE- 250/637: Performed by: STUDENT IN AN ORGANIZED HEALTH CARE EDUCATION/TRAINING PROGRAM

## 2021-12-20 PROCEDURE — 99283 EMERGENCY DEPT VISIT LOW MDM: CPT

## 2021-12-20 PROCEDURE — 71046 X-RAY EXAM CHEST 2 VIEWS: CPT

## 2021-12-20 RX ORDER — CYCLOBENZAPRINE HCL 10 MG
5 TABLET ORAL
Status: COMPLETED | OUTPATIENT
Start: 2021-12-20 | End: 2021-12-20

## 2021-12-20 RX ORDER — ACETAMINOPHEN 325 MG/1
650 TABLET ORAL
Qty: 20 TABLET | Refills: 0 | Status: SHIPPED | OUTPATIENT
Start: 2021-12-20

## 2021-12-20 RX ORDER — ACETAMINOPHEN 500 MG
1000 TABLET ORAL ONCE
Status: COMPLETED | OUTPATIENT
Start: 2021-12-20 | End: 2021-12-20

## 2021-12-20 RX ORDER — CYCLOBENZAPRINE HCL 5 MG
5 TABLET ORAL
Qty: 15 TABLET | Refills: 0 | Status: SHIPPED | OUTPATIENT
Start: 2021-12-20 | End: 2022-03-16 | Stop reason: ALTCHOICE

## 2021-12-20 RX ADMIN — ACETAMINOPHEN 1000 MG: 500 TABLET ORAL at 15:31

## 2021-12-20 RX ADMIN — CYCLOBENZAPRINE 5 MG: 10 TABLET, FILM COATED ORAL at 15:31
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
## 2022-03-16 ENCOUNTER — OFFICE VISIT (OUTPATIENT)
Dept: FAMILY MEDICINE CLINIC | Age: 42
End: 2022-03-16
Payer: MEDICAID

## 2022-03-16 VITALS
OXYGEN SATURATION: 98 % | BODY MASS INDEX: 19.81 KG/M2 | SYSTOLIC BLOOD PRESSURE: 118 MMHG | WEIGHT: 116 LBS | TEMPERATURE: 97.3 F | DIASTOLIC BLOOD PRESSURE: 74 MMHG | RESPIRATION RATE: 16 BRPM | HEART RATE: 71 BPM | HEIGHT: 64 IN

## 2022-03-16 DIAGNOSIS — Z12.31 SCREENING MAMMOGRAM, ENCOUNTER FOR: ICD-10-CM

## 2022-03-16 DIAGNOSIS — M54.50 LUMBAR BACK PAIN: Primary | ICD-10-CM
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
## 2022-03-16 DIAGNOSIS — M54.6 THORACIC BACK PAIN, UNSPECIFIED BACK PAIN LATERALITY, UNSPECIFIED CHRONICITY: ICD-10-CM

## 2022-03-16 PROCEDURE — 99213 OFFICE O/P EST LOW 20 MIN: CPT | Performed by: NURSE PRACTITIONER

## 2022-03-16 RX ORDER — ESCITALOPRAM OXALATE 20 MG/1
20 TABLET ORAL DAILY
COMMUNITY
Start: 2021-12-30 | End: 2022-03-18 | Stop reason: ALTCHOICE

## 2022-03-16 RX ORDER — HALOPERIDOL 1 MG/1
1 TABLET ORAL 2 TIMES DAILY
COMMUNITY
Start: 2021-12-30 | End: 2022-03-16 | Stop reason: ALTCHOICE

## 2022-03-16 RX ORDER — VALSARTAN 160 MG/1
160 TABLET ORAL DAILY
COMMUNITY
Start: 2022-03-01 | End: 2022-05-04

## 2022-03-18 PROBLEM — F11.21 OPIOID DEPENDENCE IN REMISSION (HCC): Status: ACTIVE | Noted: 2019-05-16

## 2022-03-18 PROBLEM — N20.0 NEPHROLITHIASIS: Status: ACTIVE | Noted: 2018-02-22
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
## 2022-03-19 PROBLEM — I63.9 CVA (CEREBRAL VASCULAR ACCIDENT) (HCC): Status: ACTIVE | Noted: 2019-05-07

## 2022-03-19 PROBLEM — I10 ESSENTIAL HYPERTENSION: Status: ACTIVE | Noted: 2017-11-03

## 2022-03-19 PROBLEM — D56.0 ALPHA THALASSEMIA (HCC): Status: ACTIVE | Noted: 2017-11-03

## 2022-03-20 PROBLEM — Z86.19 HEPATITIS C VIRUS INFECTION CURED AFTER ANTIVIRAL DRUG THERAPY: Status: ACTIVE | Noted: 2018-02-22

## 2022-03-20 PROBLEM — Z72.0 TOBACCO ABUSE: Status: ACTIVE | Noted: 2019-05-16

## 2022-03-28 ENCOUNTER — HOSPITAL ENCOUNTER (OUTPATIENT)
Dept: PHYSICAL THERAPY | Age: 42
Discharge: HOME | End: 2022-03-28
Payer: MEDICAID

## 2022-03-28 PROCEDURE — 97140 MANUAL THERAPY 1/> REGIONS: CPT | Performed by: PHYSICAL THERAPIST

## 2022-03-28 PROCEDURE — 97161 PT EVAL LOW COMPLEX 20 MIN: CPT | Performed by: PHYSICAL THERAPIST

## 2022-03-28 PROCEDURE — 97110 THERAPEUTIC EXERCISES: CPT | Performed by: PHYSICAL THERAPIST
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
## 2022-04-04 ENCOUNTER — HOSPITAL ENCOUNTER (OUTPATIENT)
Dept: PHYSICAL THERAPY | Age: 42
Discharge: HOME | End: 2022-04-04
Payer: MEDICAID

## 2022-04-04 PROCEDURE — 20561 NDL INSJ W/O NJX 3+ MUSC: CPT | Performed by: PHYSICAL THERAPIST

## 2022-04-04 PROCEDURE — 97110 THERAPEUTIC EXERCISES: CPT | Performed by: PHYSICAL THERAPIST

## 2022-04-04 NOTE — PROGRESS NOTES
[facility]  [address], [address] [facility]    OUTPATIENT PHYSICAL THERAPY DAILY TREATMENT NOTE  VISIT: 2    NAME: Kendra Haddad AGE: 37 y.o. GENDER: female  DATE: 4/4/2022  REFERRING PHYSICIAN: Jayy Melendrez NP    GOALS  Short term goals  Time frame: 4 weeks  1. Patient will be compliant and independent with the initial HEP as evidenced by being able to perform without cuing. 2. Patient will report a 75% improvement in symptoms. 3. Patient report a 75% improvement in sleeping. 4. Patient will have a full shoulder ROM without pain to allow improved reaching at work. 5. Patient will tolerate 45 minutes of clinic activities before onset of symptoms.      Long term goals  Time frame: 8 weeks  1. Patient will report pain level decrease to 1/10 to allow increased ease of movement. 2. Patient will have an improved score on the DORA outcome measure by 10 points to demonstrate an increase in functional activity tolerance. 3. Patient will be independent in final individualized HEP. 4. Patient will have an increase in cervical flexion ROM to 60 to allow performance of reading tasks. 5. Patient will return to full work duties without being limited by symptoms. 6. Patient will sleep 6-8 hours without being interrupted by pain.      SUBJECTIVE:   \"I feel about the same, but the exercises seem to be helping\"    Pain In: 6/10  OBJECTIVE DATA SUMMARY:   EXAMINATION/PRESENTATION/DECISION MAKING:   Pain:  Location: periscapular and mid-thoracic pain  Quality: burning, spasm, knotting, stiff  Now: 7/10  Best: 6/10  Worst: 10/10  Easing Factors: stretching, support of pillow in supine  Aggravating Factors: movement, walking, standing, lifting, reaching overhead     Strength:                                          LEFT                  RIGHT  Shoulder flexion              5/5                     5/5  Shoulder abduction        5/5                     5/5 Shoulder ER                   5/5                     5/5  Shoulder IR                    5/5                     5/5     Range of Motion:                                          LEFT                  RIGHT  Shoulder flexion              170 p!                170 p! Shoulder abduction        170 p!                170 p!                   Shoulder ER                   90                      90  Shoulder IR                    T5                      T4     Spinal Assessment:   Cervical Spine (AROM)  (*Measured with single inclinometer)  Flexion  *            45  Extension*         90  R side bend*      70 L pulling  L side bend*      70  R rotation           85  L rotation           85      R > L thoracic rotation inc p!     Joint Mobility:   T2-10 hypomobile with pain reproduction with CPA assessment     Palpation:   TTP over thoracic paraspinal mm and periscapular mm     Neurologic Assessment:  Sensation: light touch intact  Reflexes: biceps and triceps normal     Functional Measure:   DORA: 20/50; 40% impaired     TREATMENT/INTERVENTION:  --Interventions in BOLD performed today--     Modalities (Rationale): None today     Therapeutic Exercises: to develop strength, endurance, range of motion, and flexibility  Sidelying:  Open book 10x each side     Quadruped:  Tsp rotation 10x each way     Standing:  Rows with RTB 2x 10     Sitting:  Tsp extension over foam roller with pec stretch 10x  Thoracic rotation 10x each way  Shoulder rolls 20x     Manual Therapy: for joint mobilization/manipulations and soft tissue mobilization  Prone T4-8 PA GrIII-V with cavitation  STM in prone over BL medial scapular borders     Neuro Re-Education: to improve movement, balance, coordination, kinesthetic sense, posture, and proprioception for sitting or standing balance  None today     Therapeutic Activities: to improve functional performance, power, or agility  None today     Activity tolerance and post treatment pain report: good  Pain Out: 3/10    Dry Needle Session Number: 1   Procedure: An intramuscular dry needling treatment was done to deactivate myofascial trigger points, with a 30 gauge monofilament needles, under aseptic technique. Indication(s): trigger points present with moderate to severe pain     Chart reviewed for the following:   Immanuel LEMOS, PT, have reviewed the medical history, summary list and precautions/contraindications for [employer]inox. Devendra Parham PT, have performed the following reviews on [employer] prior to the start of the session:? ???   Agreement on all muscles being treated was verified   Purpose of dry needling, side effects, possible complications, risks and benefits were explained to the patient   Procedure site(s) verified   Patient was positioned for comfort and draped for privacy   Informed Consent was signed and verified verbally   Patient was instructed on the need to report the use of blood thinners and/or immunosuppressant medications and how to respond to possible adverse effects of treatment   Self treatment of post needling soreness: ice, fluid intake, and stretching   Opportunity was given to ask any questions, all questions were answered   ???????? Treatment:   The following muscles were treated today:   Right:  Rhomboid, levator scapula, thoracic spinalis and multifidi   Left:  Rhomboid, levator scapula, thoracic spinalis and multifidi     Select one untimed code below based on number of muscle groups needled:   CPT 84491: needle insertion(s) without injection(s); 3 or more muscles. 20  min    Rationale: decrease pain, increase ROM, increase tissue extensibility and decrease trigger points to improve patient's ability to return to full duty and improve sleep quality       Education:  Education was provided to the patient on the following topics: anatomy and pathophysiology of injury. Continuing ROM and stretching intervention.  Use of modalities to address post-DN soreness. [x]    No changes were made to the home exercise program.  []    The following changes were made to the home exercise program:   Access Code: [email or web address]  URL: [email or web address]  Date: 03/28/2022  Prepared by: Zohaib Ruiz  Exercises  Sidelying Open Book Thoracic Lumbar Rotation and Extension - 2 x daily - 10 reps  Quadruped Thoracic Rotation Full Range with Hand on Neck - 2 x daily - 10 reps  Seated Thoracic Lumbar Extension with Pectoralis Stretch - 2 x daily - 2 sets - 10 reps  Standing Bilateral Low Shoulder Row with Anchored Resistance - 2 x daily - 3 sets - 10 reps    Patient verbalized understanding of the topics presented. ASSESSMENT:   Patient showed good response to treatment with decrease in pain. Patient was educated on expected response to dry needling intervention and was encouraged to contact clinic with any questions. Patient continues to show soft tissue mobility, ROM, and motor coordination impairments that are contributing to functional limitations in prolonged positioning, reaching, and and sleep quality. Patient will continue to benefit from skilled Physical Therapy intervention to address listed impairmnts to allow patient to return to PLOF. Patients progression toward goals is as follows:  [x]     Improving appropriately and progressing toward goals  []     Improving slowly and progressing toward goals  []     Not making progress toward goals and plan of care will be adjusted    PLAN OF CARE:   Patient continues to benefit from skilled intervention to address the above impairments. [x]    Continue treatment per established plan of care.   []     Recommend the following changes to the plan of care:     Recommendations/Intent for next treatment: Reassess ROM and soft tissue restrictions and modifiy exercise interventions as appropriate    Zohaib Ruiz, PT   Time Calculation: 33 mins  Patient Time in clinic:   Start Time: 1503   Stop Time: [phone]

## 2022-04-15 ENCOUNTER — HOSPITAL ENCOUNTER (OUTPATIENT)
Dept: PHYSICAL THERAPY | Age: 42
Discharge: HOME | End: 2022-04-15
Payer: MEDICAID

## 2022-04-15 PROCEDURE — 97140 MANUAL THERAPY 1/> REGIONS: CPT | Performed by: PHYSICAL THERAPIST

## 2022-04-15 PROCEDURE — 97110 THERAPEUTIC EXERCISES: CPT | Performed by: PHYSICAL THERAPIST

## 2022-04-15 NOTE — PROGRESS NOTES
[facility]  [address], [address] [facility]    OUTPATIENT PHYSICAL THERAPY DAILY TREATMENT NOTE  VISIT: 3    NAME: Eulalia Cano AGE: 37 y.o. GENDER: female  DATE: 4/15/2022  REFERRING PHYSICIAN: Letty Melendrez NP    GOALS  Short term goals  Time frame: 4 weeks  1. Patient will be compliant and independent with the initial HEP as evidenced by being able to perform without cuing. 2. Patient will report a 75% improvement in symptoms. 3. Patient report a 75% improvement in sleeping. 4. Patient will have a full shoulder ROM without pain to allow improved reaching at work. 5. Patient will tolerate 45 minutes of clinic activities before onset of symptoms.      Long term goals  Time frame: 8 weeks  1. Patient will report pain level decrease to 1/10 to allow increased ease of movement. 2. Patient will have an improved score on the DORA outcome measure by 10 points to demonstrate an increase in functional activity tolerance. 3. Patient will be independent in final individualized HEP. 4. Patient will have an increase in cervical flexion ROM to 60 to allow performance of reading tasks. 5. Patient will return to full work duties without being limited by symptoms. 6. Patient will sleep 6-8 hours without being interrupted by pain. SUBJECTIVE:   \"I haven't had any pain in my shoulder since the last visit. I have noticed pain and stiffness in my upper neck since last visit. \"    Pain In: 9/10 in the neck; 0/10 periscapular pain  OBJECTIVE DATA SUMMARY:   EXAMINATION/PRESENTATION/DECISION MAKING:   Pain:  Location: periscapular and mid-thoracic pain  Quality: burning, spasm, knotting, stiff  Now: 7/10  Best: 6/10  Worst: 10/10  Easing Factors: stretching, support of pillow in supine  Aggravating Factors: movement, walking, standing, lifting, reaching overhead     Strength:                                          LEFT                  RIGHT  Shoulder flexion              5/5 5/5  Shoulder abduction        5/5                     5/5                       Shoulder ER                   5/5                     5/5  Shoulder IR                    5/5                     5/5     Range of Motion:                                          LEFT                  RIGHT  Shoulder flexion              170 p!                170 p! Shoulder abduction        170 p!                170 p!                   Shoulder ER                   90                      90  Shoulder IR                    T5                      T4     Spinal Assessment:   Cervical Spine (AROM)  (*Measured with single inclinometer)  Flexion  *            45  Extension*         90  R side bend*      70 L pulling  L side bend*      70  R rotation           85  L rotation           85      R > L thoracic rotation inc p!     Joint Mobility:   T2-10 hypomobile with pain reproduction with CPA assessment     Palpation:   TTP over thoracic paraspinal mm and periscapular mm     Neurologic Assessment:  Sensation: light touch intact  Reflexes: biceps and triceps normal     Functional Measure:   DORA: 20/50; 40% impaired     TREATMENT/INTERVENTION:  --Interventions in BOLD performed today--     Modalities (Rationale): None today     Therapeutic Exercises: to develop strength, endurance, range of motion, and flexibility  Supine:  Self-massage to subox mm with lacrosse ball \"peanut\" 3min  Chin tuck 10x 3\" hold    Sidelying:  Open book 10x each side     Quadruped:  Tsp rotation 10x each way     Standing:  Rows at cable column 20# 2x 10  Horizontal ABD RTB 2x 10     Sitting:  Tsp extension over foam roller with pec stretch 10x  Thoracic rotation 10x each way  Shoulder rolls 20x     Manual Therapy: for joint mobilization/manipulations and soft tissue mobilization  Prone T4-8 PA GrIII-V with cavitation  STM in supine to R subox mm, C3-5 paraspinals, and R LS  Gr III R UPA to R C5 with combined extension, R SB, and L rotation     Neuro Re-Education: to improve movement, balance, coordination, kinesthetic sense, posture, and proprioception for sitting or standing balance  None today     Therapeutic Activities: to improve functional performance, power, or agility  None today     Activity tolerance and post treatment pain report:   good  Pain Out: 3/10    Education:  Education was provided to the patient on the following topics: anatomy and pathophysiology of injury. Continuing ROM and stretching intervention. Use of modalities to address post-DN soreness. [x]    No changes were made to the home exercise program.  []    The following changes were made to the home exercise program:   Access Code: [email or web address]  URL: [email or web address]  Date: 03/28/2022  Prepared by: Caty Romp  Exercises  Sidelying Open Book Thoracic Lumbar Rotation and Extension - 2 x daily - 10 reps  Quadruped Thoracic Rotation Full Range with Hand on Neck - 2 x daily - 10 reps  Seated Thoracic Lumbar Extension with Pectoralis Stretch - 2 x daily - 2 sets - 10 reps  Standing Bilateral Low Shoulder Row with Anchored Resistance - 2 x daily - 3 sets - 10 reps    Patient verbalized understanding of the topics presented. ASSESSMENT:   Patient showed good response to treatment with decrease in pain. HEP adjusted to address cervical symptoms. Patient continues to show soft tissue mobility, ROM, and motor coordination impairments that are contributing to functional limitations in prolonged positioning, reaching, and and sleep quality. Her current symptoms/impairments are isolated to cervical region with thoracic and periscapular regions free from pain. Patient will continue to benefit from skilled Physical Therapy intervention to address listed impairmnts to allow patient to return to PLOF.     Patients progression toward goals is as follows:  [x]     Improving appropriately and progressing toward goals  []     Improving slowly and progressing toward goals  []     Not making progress toward goals and plan of care will be adjusted    PLAN OF CARE:   Patient continues to benefit from skilled intervention to address the above impairments. [x]    Continue treatment per established plan of care. []     Recommend the following changes to the plan of care:     Recommendations/Intent for next treatment: Progress postural exercise. Possible DN to cervical region if pain continues.     Romaine Gonzales, PT   Time Calculation: 34 mins  Patient Time in clinic:   Start Time: 1501   Stop Time: [phone]

## 2022-04-22 ENCOUNTER — HOSPITAL ENCOUNTER (OUTPATIENT)
Dept: PHYSICAL THERAPY | Age: 42
Discharge: HOME | End: 2022-04-22
Payer: MEDICAID

## 2022-04-22 PROCEDURE — 97140 MANUAL THERAPY 1/> REGIONS: CPT | Performed by: PHYSICAL THERAPIST

## 2022-04-22 PROCEDURE — 20561 NDL INSJ W/O NJX 3+ MUSC: CPT | Performed by: PHYSICAL THERAPIST

## 2022-04-22 NOTE — PROGRESS NOTES
[facility]  [address], [address] [facility]    OUTPATIENT PHYSICAL THERAPY DAILY TREATMENT NOTE  VISIT: 4    NAME: Medina Sawant AGE: 37 y.o. GENDER: female  DATE: 4/22/2022  REFERRING PHYSICIAN: Elise Melendrez NP    GOALS  Short term goals  Time frame: 4 weeks  1. Patient will be compliant and independent with the initial HEP as evidenced by being able to perform without cuing. 2. Patient will report a 75% improvement in symptoms. 3. Patient report a 75% improvement in sleeping. 4. Patient will have a full shoulder ROM without pain to allow improved reaching at work. 5. Patient will tolerate 45 minutes of clinic activities before onset of symptoms.      Long term goals  Time frame: 8 weeks  1. Patient will report pain level decrease to 1/10 to allow increased ease of movement. 2. Patient will have an improved score on the DORA outcome measure by 10 points to demonstrate an increase in functional activity tolerance. 3. Patient will be independent in final individualized HEP. 4. Patient will have an increase in cervical flexion ROM to 60 to allow performance of reading tasks. 5. Patient will return to full work duties without being limited by symptoms. 6. Patient will sleep 6-8 hours without being interrupted by pain. SUBJECTIVE:   \"It has been a lot better, but the middle of my neck and my right shoulder still hurt a little. I haven't had a headache this week. \"    Pain In: 3/10 in med cervical region and R superior scapular region  OBJECTIVE DATA SUMMARY:   EXAMINATION/PRESENTATION/DECISION MAKING:   Pain:  Location: periscapular and mid-thoracic pain  Quality: burning, spasm, knotting, stiff  Now: 7/10  Best: 6/10  Worst: 10/10  Easing Factors: stretching, support of pillow in supine  Aggravating Factors: movement, walking, standing, lifting, reaching overhead     Strength:                                          LEFT                  RIGHT  Shoulder flexion 5/5                     5/5  Shoulder abduction        5/5                     5/5                       Shoulder ER                   5/5                     5/5  Shoulder IR                    5/5                     5/5     Range of Motion:                                          LEFT                  RIGHT  Shoulder flexion              170 p!                170 p! Shoulder abduction        170 p!                170 p!                   Shoulder ER                   90                      90  Shoulder IR                    T5                      T4     Spinal Assessment:   Cervical Spine (AROM)  (*Measured with single inclinometer)  Flexion  *            45  Extension*         90  R side bend*      70 L pulling  L side bend*      70  R rotation           85  L rotation           85      R > L thoracic rotation inc p!     Joint Mobility:   T2-10 hypomobile with pain reproduction with CPA assessment     Palpation:   TTP over thoracic paraspinal mm and periscapular mm     Neurologic Assessment:  Sensation: light touch intact  Reflexes: biceps and triceps normal     Functional Measure:   DORA: 20/50; 40% impaired     TREATMENT/INTERVENTION:  --Interventions in BOLD performed today--     Modalities (Rationale): None today     Therapeutic Exercises: to develop strength, endurance, range of motion, and flexibility  Supine:  Self-massage to subox mm with lacrosse ball \"peanut\" 3min  Chin tuck 10x 3\" hold    Sidelying:  Open book 10x each side     Quadruped:  Tsp rotation 10x each way     Standing:  Rows at cable column 20# 2x 10  Horizontal ABD RTB 2x 10     Sitting:  Tsp extension over foam roller with pec stretch 10x  Thoracic rotation 10x each way  Shoulder rolls 20x     Manual Therapy: for joint mobilization/manipulations and soft tissue mobilization  Prone T4-8 PA GrIII-V with cavitation  STM in supine to subox mm, C3-5 paraspinals, and R LS  Gr III R UPA to R C5 with combined extension, R SB, and L rotation     Neuro Re-Education: to improve movement, balance, coordination, kinesthetic sense, posture, and proprioception for sitting or standing balance  None today     Therapeutic Activities: to improve functional performance, power, or agility  None today     Dry Needle Session Number: 2   Procedure: An intramuscular dry needling treatment was done to deactivate myofascial trigger points, with 30x 30 gauge monofilament needles, under aseptic technique. Indication(s): pain provoking trigger points present     Chart reviewed for the following:   IJasvir, PT, have reviewed the medical history, summary list and precautions/contraindications for Blend. Jayashree Mclaughlin, PT, have performed the following reviews on Blend prior to the start of the session:? ???   Agreement on all muscles being treated was verified   Purpose of dry needling, side effects, possible complications, risks and benefits were explained to the patient   Procedure site(s) verified   Patient was positioned for comfort and draped for privacy   Informed Consent was signed and verified verbally   Patient was instructed on the need to report the use of blood thinners and/or immunosuppressant medications and how to respond to possible adverse effects of treatment   Self treatment of post needling soreness: ice, fluid intake, and stretching   Opportunity was given to ask any questions, all questions were answered   ???????? Treatment:   The following muscles were treated today:   Right:  Levator scapulae, C3-6 deep cervical paraspinal mm   Left:  C3-6 deep cervical paraspinal mm     CPT 93902: needle insertion(s) without injection(s); 3 or more muscles.    20  min    Rationale: decrease pain, increase ROM and decrease trigger points to improve patient's ability to perform overhead activity required for work     Activity tolerance and post treatment pain report:   good  Pain Out: 0/10    Education:  Education was provided to the patient on the following topics: anatomy and pathophysiology of injury. Continuing ROM and stretching intervention. Use of modalities to address post-DN soreness. [x]    No changes were made to the home exercise program.  []    The following changes were made to the home exercise program:   Access Code: [email or web address]  URL: [email or web address]  Date: 03/28/2022  Prepared by: Janki Glimpse  Exercises  Sidelying Open Book Thoracic Lumbar Rotation and Extension - 2 x daily - 10 reps  Quadruped Thoracic Rotation Full Range with Hand on Neck - 2 x daily - 10 reps  Seated Thoracic Lumbar Extension with Pectoralis Stretch - 2 x daily - 2 sets - 10 reps  Standing Bilateral Low Shoulder Row with Anchored Resistance - 2 x daily - 3 sets - 10 reps    Patient verbalized understanding of the topics presented. ASSESSMENT:   Patient responded well to second dry needling treatment with complete symptom relief. PT and patient discussed discharge and PT expects 1-2 additional visits before discharge occurs. Patient continues to show soft tissue mobility, ROM, and motor coordination impairments, but patient is able to better manage these symptoms with HEP at this time. Patient will continue to benefit from skilled Physical Therapy intervention to address listed impairmnts to allow patient to return to PLOF. Patients progression toward goals is as follows:  [x]     Improving appropriately and progressing toward goals  []     Improving slowly and progressing toward goals  []     Not making progress toward goals and plan of care will be adjusted    PLAN OF CARE:   Patient continues to benefit from skilled intervention to address the above impairments. [x]    Continue treatment per established plan of care.   []     Recommend the following changes to the plan of care:     Recommendations/Intent for next treatment: Progress postural exercise, transition to independent management with postural correction and HEP performance.     Kristeen Canavan, PT   Time Calculation: 27 mins  Patient Time in clinic:   Start Time: 1302   Stop Time: [phone]

## 2022-04-29 ENCOUNTER — HOSPITAL ENCOUNTER (OUTPATIENT)
Dept: MAMMOGRAPHY | Age: 42
Discharge: HOME | End: 2022-04-29
Attending: NURSE PRACTITIONER
Payer: MEDICAID

## 2022-04-29 DIAGNOSIS — Z12.31 SCREENING MAMMOGRAM, ENCOUNTER FOR: ICD-10-CM

## 2022-04-29 PROCEDURE — 77063 BREAST TOMOSYNTHESIS BI: CPT

## 2022-05-04 RX ORDER — GUAIFENESIN 100 MG/5ML
LIQUID (ML) ORAL
Qty: 30 TABLET | Refills: 5 | Status: SHIPPED | OUTPATIENT
Start: 2022-05-04

## 2022-05-04 RX ORDER — VALSARTAN 160 MG/1
TABLET ORAL
Qty: 90 TABLET | Refills: 1 | Status: SHIPPED | OUTPATIENT
Start: 2022-05-04 | End: 2022-09-19

## 2022-05-04 RX ORDER — CLOPIDOGREL BISULFATE 75 MG/1
TABLET ORAL
Qty: 90 TABLET | Refills: 1 | Status: SHIPPED | OUTPATIENT
Start: 2022-05-04 | End: 2022-10-31

## 2022-05-06 ENCOUNTER — HOSPITAL ENCOUNTER (OUTPATIENT)
Dept: PHYSICAL THERAPY | Age: 42
Discharge: HOME | End: 2022-05-06

## 2022-05-06 NOTE — THERAPY DISCHARGE
[facility]  [address], [address] [facility]    OUTPATIENT physical Therapy discharge note      5/6/2022:  Patient will be discharged from physical therapy at this time. Criteria for termination of care:    []           Patient has plateaued  []           Patient has not returned to therapy  []           Patient has missed three or more visits without prior notification  [x]           Other: Patient called to cancel and states she is feeling 100% better and would like to discharge from care. Patient was seen for 4 visits from 3/28/22 to 4/22/22. Please refer to the most recent progress note for the latest PT info available. If you need anything further faxed to you, please contact us at [phone].     Thank you for this referral.  Alex Shirley, PT

## 2022-07-01 ENCOUNTER — HOSPITAL ENCOUNTER (EMERGENCY)
Age: 42
Discharge: HOME | End: 2022-07-01
Attending: STUDENT IN AN ORGANIZED HEALTH CARE EDUCATION/TRAINING PROGRAM

## 2022-07-01 VITALS
SYSTOLIC BLOOD PRESSURE: 123 MMHG | DIASTOLIC BLOOD PRESSURE: 47 MMHG | HEART RATE: 87 BPM | OXYGEN SATURATION: 97 % | TEMPERATURE: 97.8 F | RESPIRATION RATE: 18 BRPM

## 2022-07-01 DIAGNOSIS — U07.1 COVID-19: Primary | ICD-10-CM

## 2022-07-01 NOTE — ED PROVIDER NOTES
Is a 73-year-old female who presents ambulatory to the emergency room with complaints of COVID-like symptoms since Tuesday. Patient states on Tuesday she started to feel ill with general malaise, body aches and pains. Tested positive by way of a home test yesterday. Has not been vaccinated, no boosting. Has been taking Tylenol for body aches and pains with minimal relief of her symptoms. Comes to the emergency room for further evaluation. Denies any chest pain, shortness of breath, dizziness, nausea or vomiting. No fevers. Positive chills. Mild sore throat. Nothing is improving her symptoms. There are no further complaints at this time. Gio Melendrez, NP  Past Medical History:  No date: ex- IVDU (intravenous drug user)  No date: H/O hysterectomy for benign disease  No date: Hypertension  No date: Major depression, recurrent (HCC)  No date: Methadone maintenance therapy patient ([facility]  ovarian cyst: Other ill-defined conditions(799.89)  No date: PTSD (post-traumatic stress disorder)  No date:  Thalassemia  Past Surgical History:  No date: HX GYN  No date: HX HEENT      Comment:  t&a  2009: HX ARIANA AND BSO      Comment:  Endometriosis, Uterine Septum             Past Medical History:   Diagnosis Date    ex- IVDU (intravenous drug user)     H/O hysterectomy for benign disease     Hypertension     Major depression, recurrent ([facility]     Methadone maintenance therapy patient ([facility]     Other ill-defined conditions(799.89) ovarian cyst    PTSD (post-traumatic stress disorder)     Thalassemia        Past Surgical History:   Procedure Laterality Date    HX GYN      HX HEENT      t&a    HX ARIANA AND BSO  2009    Endometriosis, Uterine Septum         Family History:   Problem Relation Age of Onset    Hypertension Mother    [facility] Arthritis-rheumatoid Mother     Hypertension Sister     Asthma Sister     Heart Disease Sister     Diabetes Maternal Aunt     Diabetes Maternal Uncle     Other Son Lactose Intolerance    Asthma Daughter     Other Daughter         Alpha Thalessmia       Social History     Socioeconomic History    Marital status: SINGLE     Spouse name: Not on file    Number of children: Not on file    Years of education: Not on file    Highest education level: Not on file   Occupational History    Not on file   Tobacco Use    Smoking status: Current Every Day Smoker     Packs/day: 0.50    Smokeless tobacco: Current User   Vaping Use    Vaping Use: Never used   Substance and Sexual Activity    Alcohol use: No    Drug use: No    Sexual activity: Not Currently     Birth control/protection: None   Other Topics Concern    Not on file   Social History Narrative    Not on file     Social Determinants of Health     Financial Resource Strain:     Difficulty of Paying Living Expenses: Not on file   Food Insecurity:     Worried About Running Out of Food in the Last Year: Not on file    Genie of Food in the Last Year: Not on file   Transportation Needs:     Lack of Transportation (Medical): Not on file    Lack of Transportation (Non-Medical):  Not on file   Physical Activity:     Days of Exercise per Week: Not on file    Minutes of Exercise per Session: Not on file   Stress:     Feeling of Stress : Not on file   Social Connections:     Frequency of Communication with Friends and Family: Not on file    Frequency of Social Gatherings with Friends and Family: Not on file    Attends [employer] Services: Not on file    Active Member of Clubs or Organizations: Not on file    Attends Club or Organization Meetings: Not on file    Marital Status: Not on file   Intimate Partner Violence:     Fear of Current or Ex-Partner: Not on file    Emotionally Abused: Not on file    Physically Abused: Not on file    Sexually Abused: Not on file   Housing Stability:     Unable to Pay for Housing in the Last Year: Not on file    Number of Places Lived in the Last Year: Not on file    Unstable Housing in the Last Year: Not on file         ALLERGIES: Amlodipine, Hydrochlorothiazide, and Nsaids (non-steroidal anti-inflammatory drug)    Review of Systems   Constitutional: Positive for chills and fatigue. Negative for appetite change, diaphoresis and fever. HENT: Positive for sore throat. Negative for congestion, ear discharge, ear pain, sinus pressure, sinus pain and trouble swallowing. Eyes: Negative for photophobia, pain, redness and visual disturbance. Respiratory: Negative for chest tightness, shortness of breath and wheezing. Cardiovascular: Negative for chest pain and palpitations. Gastrointestinal: Negative for abdominal distention, abdominal pain, nausea and vomiting. Endocrine: Negative. Genitourinary: Negative for difficulty urinating, flank pain, frequency and urgency. Musculoskeletal: Positive for myalgias. Negative for back pain, neck pain and neck stiffness. Skin: Negative for color change, pallor, rash and wound. Allergic/Immunologic: Negative. Neurological: Negative for dizziness, speech difficulty, weakness and headaches. Hematological: Does not bruise/bleed easily. Psychiatric/Behavioral: Negative for behavioral problems. The patient is not nervous/anxious. Vitals:    07/01/22 1327   BP: (!) 123/47   Pulse: 87   Resp: 18   Temp: 97.8 °F (36.6 °C)   SpO2: 97%            Physical Exam  Vitals and nursing note reviewed. Constitutional:       General: She is not in acute distress. Appearance: Normal appearance. She is well-developed. She is not ill-appearing. HENT:      Head: Normocephalic and atraumatic. Right Ear: External ear normal.      Left Ear: External ear normal.      Nose: Nose normal. No congestion. Mouth/Throat:      Mouth: Mucous membranes are moist.   Eyes:      General:         Right eye: No discharge. Left eye: No discharge.       Conjunctiva/sclera: Conjunctivae normal.      Pupils: Pupils are equal, round, and reactive to light. Neck:      Vascular: No JVD. Trachea: No tracheal deviation. Cardiovascular:      Rate and Rhythm: Normal rate and regular rhythm. Pulses: Normal pulses. Heart sounds: Normal heart sounds. No murmur heard. No gallop. Pulmonary:      Effort: Pulmonary effort is normal. No respiratory distress. Breath sounds: Normal breath sounds. No wheezing or rales. Chest:      Chest wall: No tenderness. Abdominal:      General: Bowel sounds are normal. There is no distension. Palpations: Abdomen is soft. Tenderness: There is no abdominal tenderness. There is no guarding or rebound. Genitourinary:     Comments: negative    Musculoskeletal:         General: No tenderness. Normal range of motion. Cervical back: Normal range of motion and neck supple. No tenderness. Skin:     General: Skin is warm and dry. Capillary Refill: Capillary refill takes less than 2 seconds. Coloration: Skin is not pale. Findings: No erythema or rash. Neurological:      General: No focal deficit present. Mental Status: She is alert and oriented to person, place, and time. Motor: No weakness. Coordination: Coordination normal.   Psychiatric:         Mood and Affect: Mood normal.         Behavior: Behavior normal.         Thought Content: Thought content normal.         Judgment: Judgment normal.          MDM  Number of Diagnoses or Management Options  COVID-19: established and worsening  Diagnosis management comments: Differential diagnosis includes COVID-19. After physical assessment there is no indication for imaging or laboratory data. patient was educated on progression of COVID-19 virus. Was discharged home and will follow up with PCP. Return to the emergency room with worsening symptoms. Patient in agreement with plan of care. Labs Reviewed - No data to display  No results found. 2:58 PM  Pt has been reexamined.   Pt has no new complaints, changes or physical findings. Care plan outlined and precautions discussed. All of pt's questions and concerns were addressed. Pt agrees to F/U as instructed and agrees to return to ED upon further deterioration. Pt is ready to go home. Sarah Sanchez NP    Please note that this dictation was completed with [employer], the computer voice recognition software. Quite often unanticipated grammatical, syntax, homophones, and other interpretive errors are inadvertently transcribed by the computer software. Please disregard these errors. Please excuse any errors that have escaped final proofreading. Thank you.     Procedures

## 2022-07-01 NOTE — ED TRIAGE NOTES
Patient arrives to ED complaining of covid symptoms since Tuesday, reports covid + on Thursday. Unvaccinated for covid.

## 2022-07-01 NOTE — Clinical Note
[address]  [address] [facility] [address]  [phone]    Work/School Note    Date: 7/1/2022     To Whom It May concern:    Aida Zaidi was evaluated by the following provider(s):  Attending Provider: Salomon Simmons MD  Nurse Practitioner: Janusz Antonio virus is suspected. Per the CDC guidelines we recommend home isolation until the following conditions are all met:    1. At least five days have passed since symptoms first appeared and/or had a close exposure,   2. After home isolation for five days, wearing a mask around others for the next five days,  3. At least 24 have passed since last fever without the use of fever-reducing medications and  4.  Symptoms (eg cough, shortness of breath) have improved      Sincerely,          Bipin Sahu, NP

## 2022-07-05 ENCOUNTER — PATIENT OUTREACH (OUTPATIENT)
Dept: CASE MANAGEMENT | Age: 42
End: 2022-07-05

## 2022-07-05 RX ORDER — PRAVASTATIN SODIUM 40 MG/1
40 TABLET ORAL
Qty: 90 TABLET | Refills: 0 | Status: SHIPPED | OUTPATIENT
Start: 2022-07-05 | End: 2022-08-31

## 2022-07-05 NOTE — PROGRESS NOTES
Patient contacted regarding COVID-19 risk, diagnosis. Discussed COVID-19 related testing which was available at this time. Test results were positive. Patient informed of results, if available? Patient tested + covid 22. LPN Care Coordinator contacted the patient by telephone to perform post discharge assessment. Call within 2 business days of discharge: Yes Verified name and  with patient as identifiers. Provided introduction to self, and explanation of the CTN/ACM role, and reason for call due to risk factors for infection and/or exposure to COVID-19. Symptoms reviewed with patient who verbalized the following symptoms: fatigue, pain or aching joints and cough      Due to no new or worsening symptoms encounter was not routed to provider for escalation. Discussed follow-up appointments. If no appointment was previously scheduled, appointment scheduling offered:  no. [facility] follow up appointment(s):   Future Appointments   Date Time Provider Tara Mcnair   2022 10:30 AM Deana Melendrez NP PAFP [facility]     Non-BS follow up appointment(s): n/a    Interventions to address risk factors: Education of patient/family/caregiver/guardian to support self-management-VDH and covid numbers given     Advance Care Planning:   Does patient have an Advance Directive: not on file. Educated patient about risk for severe COVID-19 due to risk factors according to CDC guidelines. LPN CC reviewed discharge instructions, medical action plan and red flag symptoms with the patient who verbalized understanding. Discussed COVID vaccination status: no. Education provided on COVID-19 vaccination as appropriate. Discussed exposure protocols and quarantine with CDC Guidelines. Patient was given an opportunity to verbalize any questions and concerns and agrees to contact LPN CC or health care provider for questions related to their healthcare.     Reviewed and educated patient on any new and changed medications related to discharge diagnosis     Was patient discharged with a pulse oximeter? no    LPN CC provided contact information. Plan for follow-up call in 5-7 days based on severity of symptoms and risk factors.

## 2022-07-05 NOTE — TELEPHONE ENCOUNTER
NP Aneesh,    Patient call for refills, stating she is out. pravastatin and aspirin.  (asa sent on 5/4/22 for 30 + 5 per rx). Patient has rescheduled appt for 7/22/22. Thanks, Eri    Last Visit: 3/16/21 NP Aneesh, labs 6/2021  Next Appointment: 7/22/22 STEPHANE Melendrez  Previous Refill Encounter(s): 4/17/21 90 + 3    Requested Prescriptions     Pending Prescriptions Disp Refills    pravastatin (PRAVACHOL) 40 mg tablet 30 Tablet 0     Sig: Take 1 Tablet by mouth nightly.      For Samuel Schultz in place:    Recommendation Provided To:    Intervention Detail: New Rx: 1, reason: Patient Preference   Gap Closed?:    Intervention Accepted By:   Shanice Nash Time Spent (min): 5

## 2022-07-12 ENCOUNTER — PATIENT OUTREACH (OUTPATIENT)
Dept: CASE MANAGEMENT | Age: 42
End: 2022-07-12

## 2022-10-31 RX ORDER — CLOPIDOGREL BISULFATE 75 MG/1
TABLET ORAL
Qty: 90 TABLET | Refills: 1 | Status: SHIPPED | OUTPATIENT
Start: 2022-10-31

## 2023-08-29 ENCOUNTER — OFFICE VISIT (OUTPATIENT)
Age: 43
End: 2023-08-29
Payer: MEDICAID

## 2023-08-29 VITALS
HEIGHT: 64 IN | BODY MASS INDEX: 21.37 KG/M2 | DIASTOLIC BLOOD PRESSURE: 98 MMHG | RESPIRATION RATE: 16 BRPM | WEIGHT: 125.2 LBS | SYSTOLIC BLOOD PRESSURE: 134 MMHG | TEMPERATURE: 97.7 F | HEART RATE: 72 BPM

## 2023-08-29 DIAGNOSIS — Z00.00 ROUTINE GENERAL MEDICAL EXAMINATION AT A HEALTH CARE FACILITY: Primary | ICD-10-CM

## 2023-08-29 DIAGNOSIS — Z86.19 HEPATITIS C VIRUS INFECTION CURED AFTER ANTIVIRAL DRUG THERAPY: ICD-10-CM

## 2023-08-29 DIAGNOSIS — I10 ESSENTIAL HYPERTENSION: ICD-10-CM

## 2023-08-29 DIAGNOSIS — Z86.73 HISTORY OF CVA (CEREBROVASCULAR ACCIDENT): ICD-10-CM

## 2023-08-29 DIAGNOSIS — Z11.3 SCREENING EXAMINATION FOR STD (SEXUALLY TRANSMITTED DISEASE): ICD-10-CM

## 2023-08-29 PROCEDURE — 3078F DIAST BP <80 MM HG: CPT | Performed by: NURSE PRACTITIONER

## 2023-08-29 PROCEDURE — 3074F SYST BP LT 130 MM HG: CPT | Performed by: NURSE PRACTITIONER

## 2023-08-29 PROCEDURE — 99396 PREV VISIT EST AGE 40-64: CPT | Performed by: NURSE PRACTITIONER

## 2023-08-29 RX ORDER — VALSARTAN 320 MG/1
320 TABLET ORAL DAILY
Qty: 90 TABLET | Refills: 3 | Status: SHIPPED | OUTPATIENT
Start: 2023-08-29

## 2023-08-29 SDOH — ECONOMIC STABILITY: FOOD INSECURITY: WITHIN THE PAST 12 MONTHS, YOU WORRIED THAT YOUR FOOD WOULD RUN OUT BEFORE YOU GOT MONEY TO BUY MORE.: OFTEN TRUE

## 2023-08-29 SDOH — ECONOMIC STABILITY: INCOME INSECURITY: HOW HARD IS IT FOR YOU TO PAY FOR THE VERY BASICS LIKE FOOD, HOUSING, MEDICAL CARE, AND HEATING?: HARD

## 2023-08-29 SDOH — ECONOMIC STABILITY: HOUSING INSECURITY
IN THE LAST 12 MONTHS, WAS THERE A TIME WHEN YOU DID NOT HAVE A STEADY PLACE TO SLEEP OR SLEPT IN A SHELTER (INCLUDING NOW)?: NO

## 2023-08-29 SDOH — ECONOMIC STABILITY: FOOD INSECURITY: WITHIN THE PAST 12 MONTHS, THE FOOD YOU BOUGHT JUST DIDN'T LAST AND YOU DIDN'T HAVE MONEY TO GET MORE.: SOMETIMES TRUE

## 2023-08-29 ASSESSMENT — PATIENT HEALTH QUESTIONNAIRE - PHQ9
5. POOR APPETITE OR OVEREATING: 0
4. FEELING TIRED OR HAVING LITTLE ENERGY: 1
2. FEELING DOWN, DEPRESSED OR HOPELESS: 0
6. FEELING BAD ABOUT YOURSELF - OR THAT YOU ARE A FAILURE OR HAVE LET YOURSELF OR YOUR FAMILY DOWN: 0
9. THOUGHTS THAT YOU WOULD BE BETTER OFF DEAD, OR OF HURTING YOURSELF: 0
SUM OF ALL RESPONSES TO PHQ QUESTIONS 1-9: 3
SUM OF ALL RESPONSES TO PHQ9 QUESTIONS 1 & 2: 0
3. TROUBLE FALLING OR STAYING ASLEEP: 2
SUM OF ALL RESPONSES TO PHQ QUESTIONS 1-9: 3
1. LITTLE INTEREST OR PLEASURE IN DOING THINGS: 0
SUM OF ALL RESPONSES TO PHQ QUESTIONS 1-9: 3
8. MOVING OR SPEAKING SO SLOWLY THAT OTHER PEOPLE COULD HAVE NOTICED. OR THE OPPOSITE, BEING SO FIGETY OR RESTLESS THAT YOU HAVE BEEN MOVING AROUND A LOT MORE THAN USUAL: 0
10. IF YOU CHECKED OFF ANY PROBLEMS, HOW DIFFICULT HAVE THESE PROBLEMS MADE IT FOR YOU TO DO YOUR WORK, TAKE CARE OF THINGS AT HOME, OR GET ALONG WITH OTHER PEOPLE: 0
7. TROUBLE CONCENTRATING ON THINGS, SUCH AS READING THE NEWSPAPER OR WATCHING TELEVISION: 0
SUM OF ALL RESPONSES TO PHQ QUESTIONS 1-9: 3

## 2023-08-29 NOTE — PROGRESS NOTES
Chief Complaint   Patient presents with    Follow-up       1. \"Have you been to the ER, urgent care clinic since your last visit? Hospitalized since your last visit? \"    no      2. \"Have you seen or consulted any other health care providers outside of the [address] [place] [address] since your last visit? \"     no      3. For patients over 45: Has the patient had a colonoscopy? Yes     If the patient is female:    4. For patients over 40: Has the patient had a mammogram? Yes    5. For patients over 21: Has the patient had a pap smear? Yes    No flowsheet data found. Health Maintenance Due   Topic Date Due    COVID-19 Vaccine (1) Never done    Varicella vaccine (1 of 2 - 2-dose childhood series) Never done    Depression Screen  Never done    HIV screen  Never done    Lipids  06/08/2022    Flu vaccine (1) 08/01/2023         PHQ-9  8/29/2023   Little interest or pleasure in doing things 0   Little interest or pleasure in doing things -   Feeling down, depressed, or hopeless 0   Trouble falling or staying asleep, or sleeping too much 2   Feeling tired or having little energy 1   Poor appetite or overeating 0   Feeling bad about yourself - or that you are a failure or have let yourself or your family down 0   Trouble concentrating on things, such as reading the newspaper or watching television 0   Moving or speaking so slowly that other people could have noticed.  Or the opposite - being so fidgety or restless that you have been moving around a lot more than usual 0   Thoughts that you would be better off dead, or of hurting yourself in some way 0   PHQ-2 Score 0   Total Score PHQ 2 -   PHQ-9 Total Score 3   If you checked off any problems, how difficult have these problems made it for you to do your work, take care of things at home, or get along with other people? 0

## 2023-08-29 NOTE — PROGRESS NOTES
Assessment/ Plan:   Full age appropriate History and Physical exam as well as health care maintenance  performed and discussed today. Risk factor modification discussed today includes safe sex practices, healthy diet and exercise, and seat belt use. Continue current medications. 1. Routine general medical examination at a health care facility  -     CBC with Auto Differential; Future  -     Comprehensive Metabolic Panel; Future  2. Hepatitis C virus infection cured after antiviral drug therapy  -     Hepatitis C RNA QNT W Genotype RFLX; Future  3. Essential hypertension  -     valsartan (DIOVAN) 320 MG tablet; Take 1 tablet by mouth daily, Disp-90 tablet, R-3Normal  Not to goal.  Increase as above. Follow-up in 1 month or sooner as needed. 4. History of CVA (cerebrovascular accident)  -     Lipid Panel; Future  -     [facility] DO [facility], [place] ([address])  5. Screening examination for STD (sexually transmitted disease)  -     CT/NG/T. Vaginalis Amplification; Future  -     HIV 1/2 Ag/Ab, 4TH Generation,W Rflx Confirm; Future     I have strongly encouraged her to consider full cessation of tobacco.      Return in about 4 weeks (around 9/26/2023) for Follow Up. Discussed expected course/resolution/complications of diagnosis in detail with patient. Medication risks/benefits/costs/interactions/alternatives discussed with patient. Pt was given after visit summary which includes diagnoses, current medications & vitals. Pt expressed understanding with the diagnosis and plan      HPI:   Yanet Wells is a 44 y.o. female who presents for annual exam.    Cardiovascular Review:  The patient has hypertension, hyperlipidemia, and history of prior stroke. Diet and Lifestyle: not attempting to follow a low fat, low cholesterol diet, sedentary, smoker daily  Home BP Monitoring: is not measured at home.   Pertinent ROS: not taking medications regularly as instructed, no medication side

## 2023-08-31 LAB
BASOPHILS # BLD AUTO: 0.1 X10E3/UL (ref 0–0.2)
BASOPHILS NFR BLD AUTO: 1 %
EOSINOPHIL # BLD AUTO: 0.1 X10E3/UL (ref 0–0.4)
EOSINOPHIL NFR BLD AUTO: 2 %
ERYTHROCYTE [DISTWIDTH] IN BLOOD BY AUTOMATED COUNT: 14.5 % (ref 11.7–15.4)
HCT VFR BLD AUTO: 45.1 % (ref 34–46.6)
HGB BLD-MCNC: 14 G/DL (ref 11.1–15.9)
HIV 1+2 AB+HIV1 P24 AG SERPL QL IA: NON REACTIVE
IMM GRANULOCYTES # BLD AUTO: 0 X10E3/UL (ref 0–0.1)
IMM GRANULOCYTES NFR BLD AUTO: 0 %
LYMPHOCYTES # BLD AUTO: 1.5 X10E3/UL (ref 0.7–3.1)
LYMPHOCYTES NFR BLD AUTO: 27 %
MCH RBC QN AUTO: 26.4 PG (ref 26.6–33)
MCHC RBC AUTO-ENTMCNC: 31 G/DL (ref 31.5–35.7)
MCV RBC AUTO: 85 FL (ref 79–97)
MONOCYTES # BLD AUTO: 0.4 X10E3/UL (ref 0.1–0.9)
MONOCYTES NFR BLD AUTO: 6 %
NEUTROPHILS # BLD AUTO: 3.6 X10E3/UL (ref 1.4–7)
NEUTROPHILS NFR BLD AUTO: 64 %
PLATELET # BLD AUTO: 301 X10E3/UL (ref 150–450)
RBC # BLD AUTO: 5.3 X10E6/UL (ref 3.77–5.28)
WBC # BLD AUTO: 5.6 X10E3/UL (ref 3.4–10.8)

## 2023-09-01 LAB
ALBUMIN SERPL-MCNC: 4.5 G/DL (ref 3.9–4.9)
ALBUMIN/GLOB SERPL: 1.5 {RATIO} (ref 1.2–2.2)
ALP SERPL-CCNC: 93 IU/L (ref 44–121)
ALT SERPL-CCNC: 10 IU/L (ref 0–32)
AST SERPL-CCNC: 13 IU/L (ref 0–40)
BILIRUB SERPL-MCNC: <0.2 MG/DL (ref 0–1.2)
BUN SERPL-MCNC: 21 MG/DL (ref 6–24)
BUN/CREAT SERPL: 23 (ref 9–23)
CALCIUM SERPL-MCNC: 10.2 MG/DL (ref 8.7–10.2)
CHLORIDE SERPL-SCNC: 105 MMOL/L (ref 96–106)
CHOLEST SERPL-MCNC: 183 MG/DL (ref 100–199)
CO2 SERPL-SCNC: 24 MMOL/L (ref 20–29)
CREAT SERPL-MCNC: 0.92 MG/DL (ref 0.57–1)
EGFRCR SERPLBLD CKD-EPI 2021: 80 ML/MIN/1.73
GLOBULIN SER CALC-MCNC: 3.1 G/DL (ref 1.5–4.5)
GLUCOSE SERPL-MCNC: 75 MG/DL (ref 70–99)
HCV GENTYP SERPL NAA+PROBE: NORMAL
HCV RNA SERPL NAA+PROBE-ACNC: NORMAL IU/ML
HCV RNA SERPL NAA+PROBE-LOG IU: NORMAL LOG10 IU/ML
HDLC SERPL-MCNC: 43 MG/DL
LABORATORY COMMENT REPORT: NORMAL
LDLC SERPL CALC-MCNC: 123 MG/DL (ref 0–99)
POTASSIUM SERPL-SCNC: 4.6 MMOL/L (ref 3.5–5.2)
PROT SERPL-MCNC: 7.6 G/DL (ref 6–8.5)
SODIUM SERPL-SCNC: 143 MMOL/L (ref 134–144)
TRIGL SERPL-MCNC: 92 MG/DL (ref 0–149)
VLDLC SERPL CALC-MCNC: 17 MG/DL (ref 5–40)

## 2023-09-02 LAB
C TRACH RRNA SPEC QL NAA+PROBE: NEGATIVE
N GONORRHOEA RRNA SPEC QL NAA+PROBE: NEGATIVE
T VAGINALIS RRNA SPEC QL NAA+PROBE: NEGATIVE

## 2023-09-05 ASSESSMENT — ENCOUNTER SYMPTOMS
EYE PAIN: 0
DIARRHEA: 0
BLOOD IN STOOL: 0
ABDOMINAL PAIN: 0
COUGH: 0
SHORTNESS OF BREATH: 0
CONSTIPATION: 0

## 2023-09-08 RX ORDER — CARVEDILOL 12.5 MG/1
TABLET ORAL
Qty: 180 TABLET | Refills: 3 | Status: SHIPPED | OUTPATIENT
Start: 2023-09-08

## 2023-09-08 NOTE — TELEPHONE ENCOUNTER
PCP: ERI Ruiz NP    Last appt: 8/29/2023   Future Appointments   Date Time Provider [address]   9/28/2023 10:15 AM ERI Elam NP [facility] BS AMB   3/8/2024  1:00 PM MD MONO Lemus BS AMB       Requested Prescriptions     Pending Prescriptions Disp Refills    carvedilol (COREG) 12.5 MG tablet [Pharmacy Med Name: CARVEDILOL 12.5 MG TABLET] 60 tablet      Sig: take 1 tablet by mouth twice a day with meals         Prior labs and Blood pressures:  BP Readings from Last 3 Encounters:   08/29/23 (!) 134/98   03/16/22 118/74   06/08/21 122/80     Lab Results   Component Value Date/Time     08/29/2023 12:00 AM    K 4.6 08/29/2023 12:00 AM     08/29/2023 12:00 AM    CO2 24 08/29/2023 12:00 AM    BUN 21 08/29/2023 12:00 AM    GFRAA >60 06/18/2021 12:28 PM     No results found for: HBA1C, [id number]  Lab Results   Component Value Date/Time    CHOL 183 08/29/2023 12:00 AM    CHOL 155 06/08/2021 12:00 AM    HDL 43 08/29/2023 12:00 AM    VLDL 15 06/08/2021 12:00 AM     No results found for: VITD3, VD3RIA    Lab Results   Component Value Date/Time    TSH 1.030 06/08/2021 12:00 AM

## 2023-10-05 RX ORDER — CLOPIDOGREL BISULFATE 75 MG/1
75 TABLET ORAL DAILY
Qty: 90 TABLET | Refills: 1 | Status: SHIPPED | OUTPATIENT
Start: 2023-10-05

## 2023-10-05 NOTE — TELEPHONE ENCOUNTER
Patient call for refill plavix. Advised of change in pharmacy to [facility]. (Already noted). Thanks, Mary    Last appointment: 8/29/23 EVERETTE Trammell   Next appointment: 10/25/23 Valeri  Previous refill encounter(s): 10/31/22 90 + 1    Requested Prescriptions     Pending Prescriptions Disp Refills    clopidogrel (PLAVIX) 75 MG tablet 90 tablet 1     Sig: Take 1 tablet by mouth daily     For Pharmacy Admin Tracking Only    Program: Medication Refill  CPA in place:    Recommendation Provided To:    Intervention Detail: New Rx: 1, reason: Patient Preference  Intervention Accepted By:   Jaime Julien Closed?:    Time Spent (min): 5

## 2023-10-25 ENCOUNTER — OFFICE VISIT (OUTPATIENT)
Age: 43
End: 2023-10-25
Payer: MEDICAID

## 2023-10-25 VITALS
SYSTOLIC BLOOD PRESSURE: 162 MMHG | DIASTOLIC BLOOD PRESSURE: 109 MMHG | RESPIRATION RATE: 14 BRPM | TEMPERATURE: 96.9 F | HEIGHT: 64 IN | BODY MASS INDEX: 21.37 KG/M2 | OXYGEN SATURATION: 95 % | WEIGHT: 125.2 LBS | HEART RATE: 61 BPM

## 2023-10-25 DIAGNOSIS — Z72.0 TOBACCO ABUSE: ICD-10-CM

## 2023-10-25 DIAGNOSIS — I10 ESSENTIAL HYPERTENSION: Primary | ICD-10-CM

## 2023-10-25 PROCEDURE — 3078F DIAST BP <80 MM HG: CPT | Performed by: NURSE PRACTITIONER

## 2023-10-25 PROCEDURE — 99213 OFFICE O/P EST LOW 20 MIN: CPT | Performed by: NURSE PRACTITIONER

## 2023-10-25 PROCEDURE — 3074F SYST BP LT 130 MM HG: CPT | Performed by: NURSE PRACTITIONER

## 2023-10-25 RX ORDER — SPIRONOLACTONE 25 MG/1
25 TABLET ORAL DAILY
Qty: 30 TABLET | Refills: 3 | Status: SHIPPED | OUTPATIENT
Start: 2023-10-25

## 2023-10-29 ASSESSMENT — ENCOUNTER SYMPTOMS: SHORTNESS OF BREATH: 0

## 2023-11-22 ENCOUNTER — OFFICE VISIT (OUTPATIENT)
Age: 43
End: 2023-11-22
Payer: MEDICAID

## 2023-11-22 VITALS
SYSTOLIC BLOOD PRESSURE: 100 MMHG | RESPIRATION RATE: 16 BRPM | WEIGHT: 122 LBS | BODY MASS INDEX: 20.83 KG/M2 | HEIGHT: 64 IN | DIASTOLIC BLOOD PRESSURE: 69 MMHG | OXYGEN SATURATION: 100 % | TEMPERATURE: 97.3 F | HEART RATE: 91 BPM

## 2023-11-22 DIAGNOSIS — I10 ESSENTIAL HYPERTENSION: Primary | ICD-10-CM

## 2023-11-22 PROCEDURE — 99213 OFFICE O/P EST LOW 20 MIN: CPT | Performed by: NURSE PRACTITIONER

## 2023-11-22 PROCEDURE — 3074F SYST BP LT 130 MM HG: CPT | Performed by: NURSE PRACTITIONER

## 2023-11-22 PROCEDURE — 3078F DIAST BP <80 MM HG: CPT | Performed by: NURSE PRACTITIONER

## 2023-11-22 RX ORDER — VALSARTAN 160 MG/1
160 TABLET ORAL DAILY
Qty: 30 TABLET | Refills: 3 | Status: SHIPPED | OUTPATIENT
Start: 2023-11-22

## 2023-11-22 NOTE — PROGRESS NOTES
Assessment/Plan:     1. Essential hypertension  -     valsartan (DIOVAN) 160 MG tablet; Take 1 tablet by mouth daily, Disp-30 tablet, R-3Normal  -     Comprehensive Metabolic Panel; Future   Since changes last month, she is hypotensive today in office. Decrease Valsartan as above and follow up in one month. Continue to strongly encourage smoking cessation due to history of stroke. Return in about 4 weeks (around 12/20/2023) for Follow Up. Discussed expected course/resolution/complications of diagnosis in detail with patient. Medication risks/benefits/costs/interactions/alternatives discussed with patient. Pt was given after visit summary which includes diagnoses, current medications & vitals. Pt expressed understanding with the diagnosis and plan          Subjective:      Shalini Milligan is a 44 y.o. female who presents for had concerns including Follow-up. Cardiovascular Review:  The patient has hypertension, hyperlipidemia, and history of prior stroke. Diet and Lifestyle: not attempting to follow a low fat, low cholesterol diet, sedentary, smoker daily  Home BP Monitoring: is not measured at home. Pertinent ROS: taking medications regularly as instructed, no medication side effects noted, no TIA's, no chest pain on exertion, no dyspnea on exertion, no swelling of ankles.      Patient Active Problem List   Diagnosis    Opioid dependence in remission [facility])    Nephrolithiasis    Essential hypertension    CVA (cerebral vascular accident) ([facility] [address] [facility])    Alpha thalassemia ([address] [facility])    Hepatitis C virus infection cured after antiviral drug therapy    Tobacco abuse       Current Outpatient Medications   Medication Sig Dispense Refill    valsartan (DIOVAN) 160 MG tablet Take 1 tablet by mouth daily 30 tablet 3    spironolactone (ALDACTONE) 25 MG tablet Take 1 tablet by mouth daily 30 tablet 3    clopidogrel (PLAVIX) 75 MG tablet Take 1 tablet by mouth daily 90 tablet 1    carvedilol (COREG) 12.5 MG tablet take

## 2023-11-23 LAB
ALBUMIN SERPL-MCNC: 4.7 G/DL (ref 3.9–4.9)
ALBUMIN/GLOB SERPL: 1.4 {RATIO} (ref 1.2–2.2)
ALP SERPL-CCNC: 99 IU/L (ref 44–121)
ALT SERPL-CCNC: 11 IU/L (ref 0–32)
AST SERPL-CCNC: 19 IU/L (ref 0–40)
BILIRUB SERPL-MCNC: 0.3 MG/DL (ref 0–1.2)
BUN SERPL-MCNC: 25 MG/DL (ref 6–24)
BUN/CREAT SERPL: 25 (ref 9–23)
CALCIUM SERPL-MCNC: 10.4 MG/DL (ref 8.7–10.2)
CHLORIDE SERPL-SCNC: 99 MMOL/L (ref 96–106)
CO2 SERPL-SCNC: 23 MMOL/L (ref 20–29)
CREAT SERPL-MCNC: 1 MG/DL (ref 0.57–1)
EGFRCR SERPLBLD CKD-EPI 2021: 72 ML/MIN/1.73
GLOBULIN SER CALC-MCNC: 3.4 G/DL (ref 1.5–4.5)
GLUCOSE SERPL-MCNC: 91 MG/DL (ref 70–99)
POTASSIUM SERPL-SCNC: 4.7 MMOL/L (ref 3.5–5.2)
PROT SERPL-MCNC: 8.1 G/DL (ref 6–8.5)
SODIUM SERPL-SCNC: 141 MMOL/L (ref 134–144)

## 2023-12-03 ASSESSMENT — ENCOUNTER SYMPTOMS: SHORTNESS OF BREATH: 0

## 2024-01-19 NOTE — PROGRESS NOTES
HPI  Matias Hernandes Dad 46 y.o. female  presents to the office today c/o nausea/vomiting. Blood pressure (!) 143/102, pulse 70, temperature 98.5 °F (36.9 °C), temperature source Oral, resp. rate 17, height 5' 4\" (1.626 m), weight 112 lb 6.4 oz (51 kg), SpO2 99 %. Body mass index is 19.29 kg/m². Chief Complaint   Patient presents with    Vomiting    Nausea    Foot Pain     left     Hand Pain     left     Vomiting Blood    Abdominal Pain        Pt reports hx of strokes and had paralysis of left side, which she underwent PT for and regained her mobility. Due to the stroke, pt states that her memory was affected and decreased sensation/tingling sensation of left hand and foot. Today in office, pt complains of \"shooting pain\" of left hand and left foot. Describes the sensation as if \"putting her hand and foot on a burning stove\". Pt has to \"stay on ice 24/7\". Pt was seen by neurology Dr. Rocio Rivera (last OV was 1 month ago) and was prescribed Gabapentin 300 mg TID, but it did not provide any improvement. Dr. Rocio Rivera also discussed Cymbalta, but pt does not want to take it. Pt reports feeling nauseous on Darien Danica, and then the day after Belva, she started to vomit a lot, which is unusual for her since she rarely vomits. Pt notes that she salivates a lot, especially when laying down, which also triggers the vomiting. Admits to diarrhea and some unspecific GI problem, which she took Gas X for. Pt recalls yesterday when using the bathroom, pt vomited blood; afterwards having a lot of pressure in the head and jaw area. Admits to nausea today in office. Current Outpatient Medications   Medication Sig Dispense Refill    ondansetron (ZOFRAN ODT) 8 mg disintegrating tablet Take 1 Tab by mouth every eight (8) hours as needed for Nausea. 20 Tab 0    pantoprazole (PROTONIX) 40 mg tablet Take 1 Tab by mouth daily.  30 Tab 1    promethazine (PHENERGAN) 25 mg/mL injection 1 mL by IntraMUSCular route once for 1 dose. 1 Vial 0    losartan (COZAAR) 100 mg tablet TAKE 1 TABLET BY MOUTH EVERY DAY 30 Tab 11    carvedilol (COREG) 12.5 mg tablet Take 1 Tab by mouth two (2) times daily (with meals). 180 Tab 1    DULoxetine (CYMBALTA) 30 mg capsule 1 daily for 1 week, then 2 daily 30 Cap 0    gabapentin (NEURONTIN) 300 mg capsule Take 1 Cap by mouth three (3) times daily as needed for Pain. Max Daily Amount: 900 mg. 90 Cap 2    aspirin 81 mg chewable tablet TAKE 1 TABLET BY MOUTH EVERY DAY 30 Tab 11    pravastatin (PRAVACHOL) 40 mg tablet TAKE 1 TABLET BY MOUTH EVERY DAY AT NIGHT 30 Tab 11    clopidogrel (PLAVIX) 75 mg tab TAKE 1 TABLET BY MOUTH EVERY DAY 30 Tab 11    diclofenac (VOLTAREN) 1 % gel Apply 2 g to affected area four (4) times daily. 1 Each 6    methadone (DOLOPHINE) 10 mg tablet Take 8 Tabs by mouth daily. Max Daily Amount: 80 mg. 1 Tab 0     Allergies   Allergen Reactions    Amlodipine Swelling     Leg swelling    Hydrochlorothiazide Other (comments)     Intolerance: polyuria    Nsaids (Non-Steroidal Anti-Inflammatory Drug) Unknown (comments)     Dr. Belén Hull told me no NSAID's because of hypertension problems.       Past Medical History:   Diagnosis Date    ex- IVDU (intravenous drug user)     H/O hysterectomy for benign disease     Hypertension     Major depression, recurrent ([facility] [address]     Methadone maintenance therapy patient ([facility] [address]     Other ill-defined conditions(799.89) ovarian cyst    PTSD (post-traumatic stress disorder)     Thalassemia      Past Surgical History:   Procedure Laterality Date    HX GYN      HX HEENT      t&a    HX ARIANA AND BSO  2009    Endometriosis, Uterine Septum     Family History   Problem Relation Age of Onset    Hypertension Mother    [facility] Arthritis-rheumatoid Mother     Hypertension Sister     Asthma Sister     Heart Disease Sister     Diabetes Maternal Aunt     Diabetes Maternal Uncle     Other Son         Lactose Intolerance    Asthma Daughter    [facility] Other Daughter Alpha Thalessmia     Social History     Tobacco Use    Smoking status: Current Every Day Smoker     Packs/day: 0.50    Smokeless tobacco: Current User   Substance Use Topics    Alcohol use: No        Review of Systems   Constitutional: Negative for chills and fever. HENT: Negative for hearing loss and tinnitus. Eyes: Negative for blurred vision and double vision. Respiratory: Negative for cough and shortness of breath. Cardiovascular: Negative for chest pain and palpitations. Gastrointestinal: Positive for diarrhea, nausea and vomiting. Genitourinary: Negative for dysuria and frequency. Musculoskeletal: Negative for back pain and falls. Skin: Negative for itching and rash. Neurological: Negative for dizziness, loss of consciousness and headaches. Psychiatric/Behavioral: Negative for depression. The patient is not nervous/anxious. Physical Exam  Vitals signs and nursing note reviewed. Constitutional:       Appearance: Normal appearance. She is well-developed. HENT:      Head: Normocephalic and atraumatic. Right Ear: External ear normal.      Left Ear: External ear normal.      Nose: Nose normal.   Eyes:      Conjunctiva/sclera: Conjunctivae normal.      Pupils: Pupils are equal, round, and reactive to light. Neck:      Musculoskeletal: Normal range of motion and neck supple. Cardiovascular:      Rate and Rhythm: Normal rate and regular rhythm. Pulses: Normal pulses. Heart sounds: Normal heart sounds. Pulmonary:      Effort: Pulmonary effort is normal.      Breath sounds: Normal breath sounds. Abdominal:      General: Bowel sounds are normal.      Palpations: Abdomen is soft. Musculoskeletal: Normal range of motion. Skin:     General: Skin is warm and dry. Neurological:      Mental Status: She is alert and oriented to person, place, and time.    Psychiatric:         Speech: Speech normal.         Behavior: Behavior normal.         Thought Content: Thought content normal.         Judgment: Judgment normal.           ASSESSMENT and PLAN  Diagnoses and all orders for this visit:    1. Neuropathy  I will need to discuss with neurology Dr. Noe Ac about course of action. 2. Nausea and vomiting, intractability of vomiting not specified, unspecified vomiting type  Advised pt to start Zofran 8 mg PRN and Protonix 40 mg/d. Provided referral to GI Dr. Cara Varma for further evaluation. For immediate relief, pt received Phenergan injection today in office.   -     ondansetron (ZOFRAN ODT) 8 mg disintegrating tablet; Take 1 Tab by mouth every eight (8) hours as needed for Nausea. -     pantoprazole (PROTONIX) 40 mg tablet; Take 1 Tab by mouth daily.  -     REFERRAL TO GASTROENTEROLOGY  -     PROMETHAZINE HCL INJECTION  -     [place] THER/PROPH/DIAG INJECTION, SUBCUT/IM    3. Diarrhea, unspecified type  Advised pt to collect stool sample and send to office for testing to rule out abnormalities as I suspected that sx's could be viral. Will see Dr. Cara Varma for evaluation.   -     GIARDIA LAMBLIA, AG, STOOL  -     WBC, STOOL  -     CYCLOSPORA SMEAR, STOOL  -     CAMPLYLOBACTER CULTURE  -     OVA & PARASITES, STOOL  -     C DIFFICILE TOXIN A & B BY EIA  -     REFERRAL TO GASTROENTEROLOGY    Other orders  -     promethazine (PHENERGAN) 25 mg/mL injection; 1 mL by IntraMUSCular route once for 1 dose. Follow-up and Dispositions    · Return in about 1 week (around 1/6/2020), or if symptoms worsen or fail to improve, for hypertension follow up. Medication risks/benefits/costs/interactions/alternatives discussed with patient. Advised patient to call back or return to office if symptoms worsen/change/persist.  If patient cannot reach us or should anything more severe/urgent arise he/she should proceed directly to the nearest emergency department. Discussed expected course/resolution/complications of diagnosis in detail with patient.     Patient given a written after visit summary which includes her diagnoses, current medications and vitals. Patient expressed understanding with the diagnosis and plan. Written by sina Amin, as dictated by Devang Moore M.D.    1:07 PM - 1:24 PM    Total time spent with the patient 17 minutes, greater than 50% of time spent counseling patient. For information on Fall & Injury Prevention, visit: [email or web address] OR  [email or web address] OR  https://www.cdc.gov/steadi/patient.html

## 2024-03-27 DIAGNOSIS — I10 ESSENTIAL HYPERTENSION: ICD-10-CM

## 2024-03-28 DIAGNOSIS — I10 ESSENTIAL HYPERTENSION: ICD-10-CM

## 2024-03-28 NOTE — TELEPHONE ENCOUNTER
PCP: Julianna Trammell, APRN - NP    Last appt: 11/22/2023   Future Appointments   Date Time Provider Department Center   10/4/2024  1:00 PM Ozzy Kaye MD [facility] BS AMB       Requested Prescriptions     Pending Prescriptions Disp Refills    spironolactone (ALDACTONE) 25 MG tablet [Pharmacy Med Name: SPIRONOLACTONE 25 MG TABLET] 30 tablet 3     Sig: take 1 tablet by mouth once daily         Prior labs and Blood pressures:  BP Readings from Last 3 Encounters:   11/22/23 100/69   10/25/23 (!) 162/109   08/29/23 (!) 134/98     Lab Results   Component Value Date/Time     11/22/2023 12:00 AM    K 4.7 11/22/2023 12:00 AM    CL 99 11/22/2023 12:00 AM    CO2 23 11/22/2023 12:00 AM    BUN 25 11/22/2023 12:00 AM    GFRAA >60 06/18/2021 12:28 PM     No results found for: \"HBA1C\", \"NEO6UGST\"  Lab Results   Component Value Date/Time    CHOL 183 08/29/2023 12:00 AM    CHOL 155 06/08/2021 12:00 AM    HDL 43 08/29/2023 12:00 AM    VLDL 15 06/08/2021 12:00 AM     No results found for: \"VITD3\", \"VD3RIA\"    Lab Results   Component Value Date/Time    TSH 1.030 06/08/2021 12:00 AM

## 2024-03-29 NOTE — TELEPHONE ENCOUNTER
Duplicate request for spironolactone refill.  Request has already been sent to provider.  Thanks, walter

## 2024-03-31 RX ORDER — SPIRONOLACTONE 25 MG/1
25 TABLET ORAL DAILY
Qty: 30 TABLET | Refills: 3 | Status: SHIPPED | OUTPATIENT
Start: 2024-03-31

## 2024-04-01 RX ORDER — SPIRONOLACTONE 25 MG/1
25 TABLET ORAL DAILY
Qty: 30 TABLET | Refills: 3 | OUTPATIENT
Start: 2024-04-01

## 2024-05-17 NOTE — TELEPHONE ENCOUNTER
Last appointment: 11/22/23 Valeri  Next appointment: None  Previous refill encounter(s): 10/5/23 90 + 1    Requested Prescriptions     Pending Prescriptions Disp Refills    clopidogrel (PLAVIX) 75 MG tablet 90 tablet 1     Sig: Take 1 tablet by mouth daily     For Pharmacy Admin Tracking Only    Program: Medication Refill  CPA in place:    Recommendation Provided To:   Intervention Detail: New Rx: 1, reason: Patient Preference  Intervention Accepted By:   Gap Closed?:    Time Spent (min): 5

## 2024-05-20 RX ORDER — CLOPIDOGREL BISULFATE 75 MG/1
75 TABLET ORAL DAILY
Qty: 90 TABLET | Refills: 1 | Status: SHIPPED | OUTPATIENT
Start: 2024-05-20

## 2024-05-23 ENCOUNTER — PATIENT MESSAGE (OUTPATIENT)
Age: 44
End: 2024-05-23

## 2024-05-23 RX ORDER — PRAVASTATIN SODIUM 40 MG
40 TABLET ORAL NIGHTLY
Qty: 90 TABLET | Refills: 1 | OUTPATIENT
Start: 2024-05-23

## 2024-05-23 NOTE — TELEPHONE ENCOUNTER
Last appointment: 11/22/23  Next appointment: 12/21/23 appt cancelled  Previous refill encounter(s): 4/3/23    Requested Prescriptions     Pending Prescriptions Disp Refills    pravastatin (PRAVACHOL) 40 MG tablet 90 tablet 1     Sig: Take 1 tablet by mouth nightly         For Pharmacy Admin Tracking Only    Program: Medication Refill  CPA in place:    Recommendation Provided To:   Intervention Detail: New Rx: 1, reason: Patient Preference  Intervention Accepted By:   Gap Closed?:    Time Spent (min): 5

## 2024-05-23 NOTE — TELEPHONE ENCOUNTER
Sent pt mycshayant msg with scheduling ticket for OV follow up with EVERETTE Trammell for med refill.-TM 5/23/24

## 2024-06-07 RX ORDER — PRAVASTATIN SODIUM 40 MG
40 TABLET ORAL DAILY
Qty: 30 TABLET | Refills: 5 | Status: SHIPPED | OUTPATIENT
Start: 2024-06-07

## 2024-06-07 NOTE — TELEPHONE ENCOUNTER
From: Elise LARA  To: Rivka Mayers  Sent: 5/23/2024 2:32 PM EDT  Subject: Prescription/Appointment    Good Afternoon Rivka,    We have received a refill request for your Pravastatin 40 mg prescription from your pharmacy however EVERETTE Valeri has refused the refill due to the fact you would need a office visit follow up appointment before the medication can be filled. Please use the link below to schedule your visit.    Recognizing your time is valuable, attached is a link enabling you to schedule an appointment directly from this message. However, if you prefer, you can always call our office during normal business hours, and we will be happy to schedule your appointment. Thank you and we look forward to seeing you at your appointment.    How to schedule your appointment from this message:  Click the calendar icon at the bottom of this message.  Available appointment days and times will be shown on the next screen. Please choose the appointment time and follow the prompts to complete scheduling.    Have a great day!  [facility].

## 2024-10-31 DIAGNOSIS — I10 ESSENTIAL HYPERTENSION: ICD-10-CM

## 2024-10-31 RX ORDER — VALSARTAN 160 MG/1
160 TABLET ORAL DAILY
Qty: 30 TABLET | Refills: 3 | OUTPATIENT
Start: 2024-10-31

## 2024-10-31 NOTE — TELEPHONE ENCOUNTER
Sent pt a my chart message with a self scheduling ticket.I informed her that EVERETTE Valeri has refused her medication until she is seen.

## 2024-12-30 RX ORDER — CLOPIDOGREL BISULFATE 75 MG/1
75 TABLET ORAL DAILY
Qty: 30 TABLET | Refills: 0 | Status: SHIPPED | OUTPATIENT
Start: 2024-12-30

## 2024-12-30 NOTE — TELEPHONE ENCOUNTER
Tried to contact pt phone line was busy. If pt calls back per NP Valeri she has approved prescription and however is overdue for follow up appt with her as well.-TM 12/30/24.

## 2025-03-09 NOTE — TELEPHONE ENCOUNTER
Additional meds below. Patient call and needs refills of \"all\" her medications. Has appt 6/8/21. Updated rx's to 90 d/s if appropriate to that date. Thanks, Eri    Last Visit: VV 3/12/21 STEPHANE Melendrez  Next Appointment: 6/8/21 STEPHANE Melendrez  Previous Refill Encounter(s): 3/6/21 (all-except pantoprazole- 12/30/19 30 + 1    Requested Prescriptions     Pending Prescriptions Disp Refills    pantoprazole (PROTONIX) 40 mg tablet 30 Tab 1     Sig: Take 1 Tab by mouth daily.  pravastatin (PRAVACHOL) 40 mg tablet 90 Tab 0     Sig: Take 1 Tab by mouth nightly. Appointment/labs due for further refills!  valsartan (DIOVAN) 160 mg tablet 90 Tab 0     Sig: Take 1 Tab by mouth daily. Appointment/labs due for further refills! see mdm

## 2025-07-31 ENCOUNTER — OFFICE VISIT (OUTPATIENT)
Age: 45
End: 2025-07-31
Payer: COMMERCIAL

## 2025-07-31 VITALS
BODY MASS INDEX: 19.58 KG/M2 | SYSTOLIC BLOOD PRESSURE: 160 MMHG | HEART RATE: 78 BPM | HEIGHT: 66 IN | RESPIRATION RATE: 16 BRPM | TEMPERATURE: 97.3 F | DIASTOLIC BLOOD PRESSURE: 92 MMHG | OXYGEN SATURATION: 99 % | WEIGHT: 121.8 LBS

## 2025-07-31 DIAGNOSIS — I63.9 CEREBROVASCULAR ACCIDENT (CVA), UNSPECIFIED MECHANISM (HCC): ICD-10-CM

## 2025-07-31 DIAGNOSIS — Z12.31 ENCOUNTER FOR SCREENING MAMMOGRAM FOR BREAST CANCER: ICD-10-CM

## 2025-07-31 DIAGNOSIS — F11.21 OPIOID DEPENDENCE IN REMISSION (HCC): ICD-10-CM

## 2025-07-31 DIAGNOSIS — Z11.3 SCREENING EXAMINATION FOR STI: ICD-10-CM

## 2025-07-31 DIAGNOSIS — I10 ESSENTIAL HYPERTENSION: ICD-10-CM

## 2025-07-31 DIAGNOSIS — Z00.00 WELLNESS EXAMINATION: Primary | ICD-10-CM

## 2025-07-31 DIAGNOSIS — D56.0 ALPHA THALASSEMIA: ICD-10-CM

## 2025-07-31 PROCEDURE — 3077F SYST BP >= 140 MM HG: CPT

## 2025-07-31 PROCEDURE — 99396 PREV VISIT EST AGE 40-64: CPT

## 2025-07-31 PROCEDURE — 99214 OFFICE O/P EST MOD 30 MIN: CPT

## 2025-07-31 PROCEDURE — 3080F DIAST BP >= 90 MM HG: CPT

## 2025-07-31 RX ORDER — PRAVASTATIN SODIUM 40 MG
40 TABLET ORAL DAILY
Qty: 30 TABLET | Refills: 5 | Status: SHIPPED | OUTPATIENT
Start: 2025-07-31

## 2025-07-31 RX ORDER — CARVEDILOL 12.5 MG/1
12.5 TABLET ORAL 2 TIMES DAILY WITH MEALS
Qty: 180 TABLET | Refills: 3 | Status: SHIPPED | OUTPATIENT
Start: 2025-07-31

## 2025-07-31 RX ORDER — VALSARTAN 160 MG/1
160 TABLET ORAL DAILY
Qty: 30 TABLET | Refills: 3 | Status: SHIPPED | OUTPATIENT
Start: 2025-07-31

## 2025-07-31 RX ORDER — ASPIRIN 81 MG/1
81 TABLET, CHEWABLE ORAL DAILY
Qty: 90 TABLET | Refills: 3 | Status: SHIPPED | OUTPATIENT
Start: 2025-07-31

## 2025-07-31 RX ORDER — SPIRONOLACTONE 25 MG/1
25 TABLET ORAL DAILY
Qty: 30 TABLET | Refills: 3 | Status: SHIPPED | OUTPATIENT
Start: 2025-07-31

## 2025-07-31 SDOH — ECONOMIC STABILITY: FOOD INSECURITY: WITHIN THE PAST 12 MONTHS, YOU WORRIED THAT YOUR FOOD WOULD RUN OUT BEFORE YOU GOT MONEY TO BUY MORE.: NEVER TRUE

## 2025-07-31 SDOH — ECONOMIC STABILITY: FOOD INSECURITY: WITHIN THE PAST 12 MONTHS, THE FOOD YOU BOUGHT JUST DIDN'T LAST AND YOU DIDN'T HAVE MONEY TO GET MORE.: NEVER TRUE

## 2025-07-31 ASSESSMENT — PATIENT HEALTH QUESTIONNAIRE - PHQ9
SUM OF ALL RESPONSES TO PHQ QUESTIONS 1-9: 0
SUM OF ALL RESPONSES TO PHQ QUESTIONS 1-9: 0
1. LITTLE INTEREST OR PLEASURE IN DOING THINGS: NOT AT ALL
SUM OF ALL RESPONSES TO PHQ QUESTIONS 1-9: 0
2. FEELING DOWN, DEPRESSED OR HOPELESS: NOT AT ALL
SUM OF ALL RESPONSES TO PHQ QUESTIONS 1-9: 0

## 2025-07-31 NOTE — PROGRESS NOTES
Have you been to the ER, urgent care clinic since your last visit?  Hospitalized since your last visit?   NO    Have you seen or consulted any other health care providers outside our system since your last visit?   NO    Have you had a mammogram?”   NO    Date of last Mammogram: 4/29/2022             
   Nsaids      Other reaction(s): Unknown (comments)  Dr. Gee told me no NSAID's because of hypertension problems.         Medications  I personally reviewed.  Current Outpatient Medications on File Prior to Visit   Medication Sig Dispense Refill    acetaminophen (TYLENOL) 325 MG tablet Take by mouth every 6 hours as needed       No current facility-administered medications on file prior to visit.        ROS:  As listed in HPI      Objective:   Vitals  I personally reviewed.  BP (!) 160/92 (BP Site: Left Upper Arm, Patient Position: Sitting, BP Cuff Size: Large Adult)   Pulse 78   Temp 97.3 °F (36.3 °C) (Temporal)   Resp 16   Ht 1.67 m (5' 5.75\")   Wt 55.2 kg (121 lb 12.8 oz)   SpO2 99%   BMI 19.81 kg/m²      Physical Exam:   Physical Exam  General Appearance: Normal. Sitting comfortably. No acute distress.  Vital signs: Blood pressure 160/92.  HEENT: Within normal limits.  Respiratory: Clear to auscultation, no wheezing, rales, or rhonchi.  Cardiovascular: Regular rate and rhythm, no murmurs, rubs, or gallops.  Gastrointestinal: Soft, non-tender throughout. Active bowel sounds.  Extremities: Appropriate capillary refill. No LE swelling noted.  Skin: Warm and dry, no rash.  Neurological: Pupils normal, cranial nerves intact.  Psychiatric: Normal mood and behavior, AOx4.     Notable Labs and Imaging:      Results         No results found for: \"LABA1C\"  No results found for: \"IWL7VSDU\"  Lab Results   Component Value Date    WBC 5.6 08/29/2023    HGB 14.0 08/29/2023    HCT 45.1 08/29/2023    MCV 85 08/29/2023     08/29/2023     Lab Results   Component Value Date/Time     11/22/2023 12:00 AM    K 4.7 11/22/2023 12:00 AM    CL 99 11/22/2023 12:00 AM    CO2 23 11/22/2023 12:00 AM    BUN 25 11/22/2023 12:00 AM    CREATININE 1.00 11/22/2023 12:00 AM    GLUCOSE 91 11/22/2023 12:00 AM    CALCIUM 10.4 11/22/2023 12:00 AM    LABGLOM 72 11/22/2023 12:00 AM      Lab Results   Component Value Date    NA

## 2025-08-01 LAB
ALBUMIN SERPL-MCNC: 3.8 G/DL (ref 3.5–5.2)
ALBUMIN/GLOB SERPL: 1.1 (ref 1.1–2.2)
ALP SERPL-CCNC: 82 U/L (ref 35–104)
ALT SERPL-CCNC: 10 U/L (ref 10–50)
ANION GAP SERPL CALC-SCNC: 11 MMOL/L (ref 2–14)
APPEARANCE UR: CLEAR
AST SERPL-CCNC: 15 U/L (ref 10–35)
BILIRUB SERPL-MCNC: 0.3 MG/DL (ref 0–1.2)
BILIRUB UR QL: NEGATIVE
BUN SERPL-MCNC: 14 MG/DL (ref 6–20)
BUN/CREAT SERPL: 16 (ref 12–20)
CALCIUM SERPL-MCNC: 10.3 MG/DL (ref 8.6–10)
CHLORIDE SERPL-SCNC: 105 MMOL/L (ref 98–107)
CHOLEST SERPL-MCNC: 227 MG/DL (ref 0–200)
CO2 SERPL-SCNC: 27 MMOL/L (ref 20–29)
COLOR UR: NORMAL
CREAT SERPL-MCNC: 0.9 MG/DL (ref 0.6–1)
ERYTHROCYTE [DISTWIDTH] IN BLOOD BY AUTOMATED COUNT: 14.3 % (ref 11.5–14.5)
EST. AVERAGE GLUCOSE BLD GHB EST-MCNC: 115 MG/DL
GLOBULIN SER CALC-MCNC: 3.4 G/DL (ref 2–4)
GLUCOSE SERPL-MCNC: 80 MG/DL (ref 65–100)
GLUCOSE UR STRIP.AUTO-MCNC: NEGATIVE MG/DL
HAV IGM SER QL: NONREACTIVE
HBA1C MFR BLD: 5.6 % (ref 4–5.6)
HBV CORE IGM SER QL: NONREACTIVE
HBV SURFACE AG SER QL: NONREACTIVE
HCT VFR BLD AUTO: 45.9 % (ref 35–47)
HCV AB SER QL: REACTIVE
HDLC SERPL-MCNC: 49 MG/DL (ref 40–60)
HDLC SERPL: 4.7
HGB BLD-MCNC: 13.5 G/DL (ref 11.5–16)
HGB UR QL STRIP: NEGATIVE
HIV 1+2 AB+HIV1 P24 AG SERPL QL IA: NONREACTIVE
HIV 1/2 RESULT COMMENT: NORMAL
KETONES UR QL STRIP.AUTO: NEGATIVE MG/DL
LDLC SERPL CALC-MCNC: 142 MG/DL
LEUKOCYTE ESTERASE UR QL STRIP.AUTO: NEGATIVE
MCH RBC QN AUTO: 26.4 PG (ref 26–34)
MCHC RBC AUTO-ENTMCNC: 29.4 G/DL (ref 30–36.5)
MCV RBC AUTO: 89.8 FL (ref 80–99)
NITRITE UR QL STRIP.AUTO: NEGATIVE
NRBC # BLD: 0 K/UL (ref 0–0.01)
NRBC BLD-RTO: 0 PER 100 WBC
PH UR STRIP: 6 (ref 5–8)
PLATELET # BLD AUTO: 370 K/UL (ref 150–400)
PMV BLD AUTO: 11 FL (ref 8.9–12.9)
POTASSIUM SERPL-SCNC: 5.7 MMOL/L (ref 3.5–5.1)
PROT SERPL-MCNC: 7.3 G/DL (ref 6.4–8.3)
PROT UR STRIP-MCNC: NEGATIVE MG/DL
RBC # BLD AUTO: 5.11 M/UL (ref 3.8–5.2)
RPR SER QL: NONREACTIVE
SODIUM SERPL-SCNC: 142 MMOL/L (ref 136–145)
SP GR UR REFRACTOMETRY: 1.02 (ref 1–1.03)
TRIGL SERPL-MCNC: 183 MG/DL (ref 0–150)
TSH, 3RD GENERATION: 0.6 UIU/ML (ref 0.27–4.2)
UROBILINOGEN UR QL STRIP.AUTO: 1 EU/DL (ref 0.2–1)
VLDLC SERPL CALC-MCNC: 37 MG/DL
WBC # BLD AUTO: 4.9 K/UL (ref 3.6–11)

## 2025-08-03 LAB
C TRACH RRNA SPEC QL NAA+PROBE: NEGATIVE
N GONORRHOEA RRNA SPEC QL NAA+PROBE: NEGATIVE
SPECIMEN SOURCE: NORMAL
T VAGINALIS RRNA SPEC QL NAA+PROBE: NEGATIVE

## 2025-08-07 ENCOUNTER — RESULTS FOLLOW-UP (OUTPATIENT)
Age: 45
End: 2025-08-07

## 2025-08-07 DIAGNOSIS — E87.5 HYPERKALEMIA: Primary | ICD-10-CM

## 2025-09-04 ENCOUNTER — HOSPITAL ENCOUNTER (OUTPATIENT)
Facility: HOSPITAL | Age: 45
Discharge: HOME | End: 2025-09-04
Payer: COMMERCIAL

## 2025-09-04 VITALS — WEIGHT: 115 LBS | HEIGHT: 64 IN | BODY MASS INDEX: 19.63 KG/M2

## 2025-09-04 DIAGNOSIS — Z12.31 ENCOUNTER FOR SCREENING MAMMOGRAM FOR BREAST CANCER: ICD-10-CM

## 2025-09-04 PROCEDURE — 77063 BREAST TOMOSYNTHESIS BI: CPT
